# Patient Record
Sex: FEMALE | Race: WHITE | NOT HISPANIC OR LATINO
[De-identification: names, ages, dates, MRNs, and addresses within clinical notes are randomized per-mention and may not be internally consistent; named-entity substitution may affect disease eponyms.]

---

## 2017-04-13 ENCOUNTER — APPOINTMENT (OUTPATIENT)
Dept: PULMONOLOGY | Facility: CLINIC | Age: 82
End: 2017-04-13

## 2017-04-13 VITALS
HEART RATE: 68 BPM | SYSTOLIC BLOOD PRESSURE: 110 MMHG | HEIGHT: 59 IN | DIASTOLIC BLOOD PRESSURE: 80 MMHG | OXYGEN SATURATION: 96 % | TEMPERATURE: 98.3 F | BODY MASS INDEX: 20.16 KG/M2 | WEIGHT: 100 LBS

## 2017-04-21 RX ORDER — AMOXICILLIN 500 MG/1
500 TABLET, FILM COATED ORAL 3 TIMES DAILY
Qty: 42 | Refills: 0 | Status: ACTIVE | COMMUNITY
Start: 2017-04-21 | End: 1900-01-01

## 2017-05-16 ENCOUNTER — INPATIENT (INPATIENT)
Facility: HOSPITAL | Age: 82
LOS: 0 days | Discharge: HOME HEALTH | DRG: 641 | End: 2017-05-17
Attending: SPECIALIST | Admitting: SPECIALIST
Payer: COMMERCIAL

## 2017-05-16 VITALS
OXYGEN SATURATION: 95 % | HEART RATE: 81 BPM | RESPIRATION RATE: 16 BRPM | DIASTOLIC BLOOD PRESSURE: 109 MMHG | WEIGHT: 106.92 LBS | TEMPERATURE: 98 F | SYSTOLIC BLOOD PRESSURE: 184 MMHG | HEIGHT: 60 IN

## 2017-05-16 DIAGNOSIS — Z98.89 OTHER SPECIFIED POSTPROCEDURAL STATES: Chronic | ICD-10-CM

## 2017-05-16 LAB
ALBUMIN SERPL ELPH-MCNC: 2.8 G/DL — LOW (ref 3.4–5)
ALP SERPL-CCNC: 71 U/L — SIGNIFICANT CHANGE UP (ref 40–120)
ALT FLD-CCNC: 22 U/L — SIGNIFICANT CHANGE UP (ref 12–42)
ANION GAP SERPL CALC-SCNC: 7 MMOL/L — LOW (ref 9–16)
APPEARANCE UR: CLEAR — SIGNIFICANT CHANGE UP
APTT BLD: 30.9 SEC — SIGNIFICANT CHANGE UP (ref 27.5–37.4)
AST SERPL-CCNC: 49 U/L — HIGH (ref 15–37)
BACTERIA # UR AUTO: PRESENT /HPF
BASOPHILS NFR BLD AUTO: 0.3 % — SIGNIFICANT CHANGE UP (ref 0–2)
BILIRUB SERPL-MCNC: 0.8 MG/DL — SIGNIFICANT CHANGE UP (ref 0.2–1.2)
BILIRUB UR-MCNC: NEGATIVE — SIGNIFICANT CHANGE UP
BUN SERPL-MCNC: 56 MG/DL — HIGH (ref 7–23)
CALCIUM SERPL-MCNC: 8.6 MG/DL — SIGNIFICANT CHANGE UP (ref 8.5–10.5)
CHLORIDE SERPL-SCNC: 97 MMOL/L — SIGNIFICANT CHANGE UP (ref 96–108)
CK MB CFR SERPL CALC: 1.1 NG/ML — SIGNIFICANT CHANGE UP (ref 0.5–3.6)
CK SERPL-CCNC: 140 U/L — SIGNIFICANT CHANGE UP (ref 26–192)
CO2 SERPL-SCNC: 29 MMOL/L — SIGNIFICANT CHANGE UP (ref 22–31)
COLOR SPEC: YELLOW — SIGNIFICANT CHANGE UP
COMMENT - URINE: SIGNIFICANT CHANGE UP
CREAT SERPL-MCNC: 1.25 MG/DL — SIGNIFICANT CHANGE UP (ref 0.5–1.3)
DIFF PNL FLD: (no result)
EOSINOPHIL NFR BLD AUTO: 2.6 % — SIGNIFICANT CHANGE UP (ref 0–6)
EPI CELLS # UR: (no result) /HPF
GLUCOSE SERPL-MCNC: 105 MG/DL — HIGH (ref 70–99)
GLUCOSE UR QL: NEGATIVE — SIGNIFICANT CHANGE UP
HCT VFR BLD CALC: 36.5 % — SIGNIFICANT CHANGE UP (ref 34.5–45)
HGB BLD-MCNC: 12 G/DL — SIGNIFICANT CHANGE UP (ref 11.5–15.5)
INR BLD: 1.02 — SIGNIFICANT CHANGE UP (ref 0.88–1.16)
KETONES UR-MCNC: NEGATIVE — SIGNIFICANT CHANGE UP
LEUKOCYTE ESTERASE UR-ACNC: NEGATIVE — SIGNIFICANT CHANGE UP
LYMPHOCYTES # BLD AUTO: 11 % — LOW (ref 13–44)
MCHC RBC-ENTMCNC: 30.7 PG — SIGNIFICANT CHANGE UP (ref 27–34)
MCHC RBC-ENTMCNC: 32.9 G/DL — SIGNIFICANT CHANGE UP (ref 32–36)
MCV RBC AUTO: 93.4 FL — SIGNIFICANT CHANGE UP (ref 80–100)
MONOCYTES NFR BLD AUTO: 9.8 % — SIGNIFICANT CHANGE UP (ref 2–14)
NEUTROPHILS NFR BLD AUTO: 76.3 % — SIGNIFICANT CHANGE UP (ref 43–77)
NITRITE UR-MCNC: NEGATIVE — SIGNIFICANT CHANGE UP
PH UR: 5.5 — SIGNIFICANT CHANGE UP (ref 5–8)
PLATELET # BLD AUTO: 189 K/UL — SIGNIFICANT CHANGE UP (ref 150–400)
POTASSIUM SERPL-MCNC: 4.9 MMOL/L — SIGNIFICANT CHANGE UP (ref 3.5–5.3)
POTASSIUM SERPL-SCNC: 4.9 MMOL/L — SIGNIFICANT CHANGE UP (ref 3.5–5.3)
PROT SERPL-MCNC: 9.3 G/DL — HIGH (ref 6.4–8.2)
PROT UR-MCNC: 100 MG/DL
PROTHROM AB SERPL-ACNC: 11.3 SEC — SIGNIFICANT CHANGE UP (ref 9.8–12.7)
RBC # BLD: 3.91 M/UL — SIGNIFICANT CHANGE UP (ref 3.8–5.2)
RBC # FLD: 14.1 % — SIGNIFICANT CHANGE UP (ref 10.3–16.9)
RBC CASTS # UR COMP ASSIST: < 5 /HPF — SIGNIFICANT CHANGE UP
SODIUM SERPL-SCNC: 133 MMOL/L — LOW (ref 135–145)
SP GR SPEC: 1.01 — SIGNIFICANT CHANGE UP (ref 1–1.03)
TROPONIN I SERPL-MCNC: <0.015 NG/ML — SIGNIFICANT CHANGE UP (ref 0.01–0.04)
UROBILINOGEN FLD QL: 0.2 E.U./DL — SIGNIFICANT CHANGE UP
WBC # BLD: 9.7 K/UL — SIGNIFICANT CHANGE UP (ref 3.8–10.5)
WBC # FLD AUTO: 9.7 K/UL — SIGNIFICANT CHANGE UP (ref 3.8–10.5)
WBC UR QL: (no result) /HPF

## 2017-05-16 PROCEDURE — 72100 X-RAY EXAM L-S SPINE 2/3 VWS: CPT | Mod: 26

## 2017-05-16 PROCEDURE — 71275 CT ANGIOGRAPHY CHEST: CPT | Mod: 26

## 2017-05-16 PROCEDURE — 73502 X-RAY EXAM HIP UNI 2-3 VIEWS: CPT | Mod: 26

## 2017-05-16 PROCEDURE — 73502 X-RAY EXAM HIP UNI 2-3 VIEWS: CPT | Mod: 26,RT

## 2017-05-16 PROCEDURE — 99285 EMERGENCY DEPT VISIT HI MDM: CPT | Mod: 25

## 2017-05-16 PROCEDURE — 74174 CTA ABD&PLVS W/CONTRAST: CPT | Mod: 26

## 2017-05-16 PROCEDURE — 93010 ELECTROCARDIOGRAM REPORT: CPT

## 2017-05-16 PROCEDURE — 71020: CPT | Mod: 26

## 2017-05-16 PROCEDURE — 70450 CT HEAD/BRAIN W/O DYE: CPT | Mod: 26

## 2017-05-16 PROCEDURE — 93010 ELECTROCARDIOGRAM REPORT: CPT | Mod: 77

## 2017-05-16 RX ORDER — SODIUM CHLORIDE 9 MG/ML
1000 INJECTION INTRAMUSCULAR; INTRAVENOUS; SUBCUTANEOUS
Qty: 0 | Refills: 0 | Status: DISCONTINUED | OUTPATIENT
Start: 2017-05-16 | End: 2017-05-16

## 2017-05-16 RX ORDER — ASPIRIN/CALCIUM CARB/MAGNESIUM 324 MG
81 TABLET ORAL DAILY
Qty: 0 | Refills: 0 | Status: DISCONTINUED | OUTPATIENT
Start: 2017-05-16 | End: 2017-05-17

## 2017-05-16 RX ORDER — ZOLPIDEM TARTRATE 10 MG/1
5 TABLET ORAL AT BEDTIME
Qty: 0 | Refills: 0 | Status: DISCONTINUED | OUTPATIENT
Start: 2017-05-16 | End: 2017-05-16

## 2017-05-16 RX ORDER — SIMVASTATIN 20 MG/1
10 TABLET, FILM COATED ORAL AT BEDTIME
Qty: 0 | Refills: 0 | Status: DISCONTINUED | OUTPATIENT
Start: 2017-05-16 | End: 2017-05-17

## 2017-05-16 RX ORDER — ACETAMINOPHEN 500 MG
650 TABLET ORAL EVERY 6 HOURS
Qty: 0 | Refills: 0 | Status: DISCONTINUED | OUTPATIENT
Start: 2017-05-16 | End: 2017-05-17

## 2017-05-16 RX ADMIN — ZOLPIDEM TARTRATE 5 MILLIGRAM(S): 10 TABLET ORAL at 23:48

## 2017-05-16 RX ADMIN — SODIUM CHLORIDE 125 MILLILITER(S): 9 INJECTION INTRAMUSCULAR; INTRAVENOUS; SUBCUTANEOUS at 17:21

## 2017-05-16 NOTE — H&P ADULT - PROBLEM SELECTOR PLAN 2
As per ER attending, vascular surgery consulted regarding aneurysm as possible mechanism for fall. As per vascular surgery via ER attending, no surgical intervention tonight, and that patient should be clinically monitored for decompensation. Currently patient not endorsing any abdominal pain.  - Trend vitals q4-6h and serial abdominal exams  - Appreciate vascular surgery recommendations

## 2017-05-16 NOTE — ED PROVIDER NOTE - MEDICAL DECISION MAKING DETAILS
dehydration fall  no fx in LS spine  known AAA  no acute intervention per vasc surgery  req iv hydration

## 2017-05-16 NOTE — H&P ADULT - PROBLEM SELECTOR PLAN 3
Patient with hx of anxiety and insomnia. She says she takes half an ambien per night. Will Rx 2.5-5mg of Ambien x 1.

## 2017-05-16 NOTE — H&P ADULT - NSHPPHYSICALEXAM_GEN_ALL_CORE
General: NAD pleasant  HEENT: MMM otherwise normal  CV: S1S2 RRR no MRG  Lungs: CTA BL  Abdomen: soft NTND +BS  Ext: No CCE +WWP  Neuro: AAOx3 no focal deficits  MSK: paraspinal muscle tenderness b/l thoracic spine ICU Vital Signs Last 24 Hrs  T(C): 36.5, Max: 36.7 (05-16 @ 23:14)  T(F): 97.7, Max: 98 (05-16 @ 23:14)  HR: 89 (73 - 89)  BP: 176/96 (109/69 - 184/109)  BP(mean): --  ABP: --  ABP(mean): --  RR: 17 (16 - 17)  SpO2: 94% (94% - 100%)      General: NAD pleasant  HEENT: MMM otherwise normal  CV: S1S2 RRR no MRG  Lungs: CTA BL  Abdomen: soft NTND +BS  Ext: No CCE +WWP  Neuro: AAOx3 no focal deficits  MSK: paraspinal muscle tenderness b/l thoracic spine

## 2017-05-16 NOTE — ED PROVIDER NOTE - OBJECTIVE STATEMENT
89 yo female with hx of GERD- AAA s/p repair 1 year ago endovasc by Dr Carranza -HTN, HLD  s/p trip and fall 5 days ago pos head trauma- no loc  c/o of low back pain right sided  no alley focal weakness noted- c/o of constipation but also abd fullness and vague pain x 1 month- no CP or syncope  no palpitations

## 2017-05-16 NOTE — ED ADULT NURSE NOTE - OBJECTIVE STATEMENT
89 y/o female sent by PMD w/ home health aide c/o back tenderness s/p mechanical fall. Patient states "I went to my PMD for the back pain after my fall Friday and he told me that I should come to the ER. Also he said that I have a UTI." Patient at baseline ambulatory with walker or assistance, A+Ox3, skin intact.

## 2017-05-16 NOTE — ED ADULT TRIAGE NOTE - CHIEF COMPLAINT QUOTE
Pt directed to ED by PCP for UTI management and eval of Fall on Friday.  Pt states "I hit my head but I wasn't knocked out, I just lost my foot."  Pt CO Back Pain 8/10.  Pt denies N/V/D, SOB, Fevers

## 2017-05-16 NOTE — H&P ADULT - PROBLEM SELECTOR PLAN 5
Not currently on treatment however, as per CT thoracic study, there is concern for the possibility of invasive adenocarcinoma v. lymphoma.   - Will consider Pulmonary and Oncology consults v. referral as outpatient

## 2017-05-16 NOTE — H&P ADULT - HISTORY OF PRESENT ILLNESS
89F hx of HTN, HLD, Breast CA, GERD, Anxiety/Insomnia presented to [facility] s/p mechanical fall. Per patient she misstepped, tripped, and fell to the floor backwards hurting her back in the process. She remembers falling, and knows that she was being clumsy. She denies any LOC or confusion or head trauma. She then went with her  to the ER for pain control.     In the ED patient was started on IVF, and underwent CT evaluation for her fall 89F hx of HTN, HLD, Breast CA, GERD, Anxiety/Insomnia presented to [facility] s/p mechanical fall. Per patient she misstepped, tripped, and fell to the floor backwards hurting her back in the process. She remembers falling, and knows that she was being clumsy. She denies any LOC or confusion or head trauma. She then went with her  to the ER for pain control.     In the ED patient was started on IVF, and underwent CT evaluation for her fall   Vascular Surgery consulted for finding of 5.0 cm AAA

## 2017-05-16 NOTE — H&P ADULT - NSHPLABSRESULTS_GEN_ALL_CORE
12.0   9.7   )-----------( 189      ( 16 May 2017 17:17 )             36.5     05-16    133<L>  |  97  |  56<H>  ----------------------------<  105<H>  4.9   |  29  |  1.25    Ca    8.6      16 May 2017 17:18    TPro  9.3<H>  /  Alb  2.8<L>  /  TBili  0.8  /  DBili  x   /  AST  49<H>  /  ALT  22  /  AlkPhos  71  05-16    EXAM:  XR SPINE-LS-2 OR 3 VIEWS                        PROCEDURE DATE:  05/16/2017    Impression:  1. No acute injury detected. Diffuse osteopenia slightly limits   evaluation for detection of acute lucent fracture lines.  2. Minimal wedging along superior aspect of L1, age-indeterminate. If   symptoms persist, consideration could be given to cross-sectional imaging.  3. Multilevel degenerative osteoarthritis including degenerative   anterolistheses of L3 on L4 and L4 on L5. No pars defects identified.  4. Multilevel degenerative disc disease.  5. Mild dextrocurvature in the lumbar spine.      EXAM:  CT BRAIN                        PROCEDURE DATE:  05/16/2017    IMPRESSION:    1. No CT evidence of acute intracranial hemorrhage and no apparent acute   abnormality.  2. Moderate to severe chronic microvascular ischemic disease with   probable tiny lacunar infarcts in the basal ganglia bilaterally and   thalami bilaterally.  3. Age-appropriate volume loss.      EXAM:  CT ANGIO CHEST (W)AW IC                        EXAM:  CT ANGIO ABD PELV (W)AW IC          IMPRESSION:  1. Abdominal aortic biiliac endovascular stent graft in situ. Visualized   endoleak on delayed imaging. Fusiform aneurysmal dilatation of the   infrarenal abdominal aorta measuring up to 5.0 cm diameter. Slight   increase in size of the aneurysm is compared to prior study where it   measured 4.4cm. No abdominal aortic aneurysm rupture.  2. Cholelithiasis.  3. Wedge compression fracture of superior endplate of L1 new as compared   to prior study.  4. Worsening of consolidation versus mass in the posterior basal segments   lower lobes bilaterally. Cannot rule out invasive adenocarcinoma,   lymphoma. Recommend CT-guided FNA.

## 2017-05-16 NOTE — H&P ADULT - PROBLEM SELECTOR PLAN 1
Patient s/p mechanical fall - on history no stigmata signs of syncopal/pre-syncopal episode v. seizure v. cva

## 2017-05-16 NOTE — ED PROVIDER NOTE - PMH
Anxiety    Breast cancer in female    GERD (gastroesophageal reflux disease)    HTN (hypertension)    Hyperlipidemia

## 2017-05-16 NOTE — ED PROVIDER NOTE - PROGRESS NOTE DETAILS
warm feet bilateral 2+ pulses Fem pop DP PT  nl upper ext pulses-  CTA  small endo vasc leak---  5.0 cm AAA now-- was 4.4 cm 1 year ago- vasc surgery aware- no acute intervention dr duckworth aware  pt clinically apearrs intravasc dry- will require admission for iv hydration

## 2017-05-16 NOTE — ED CLERICAL - NS ED CLERK NOTE PRE-ARRIVAL INFORMATION; ADDITIONAL PRE-ARRIVAL INFORMATION
89/F/ TILA CASTELLON OF DR. BUSH HX OF LEAKING ABDOMINAL ANEURYSM ANOREXIA, DYSPHAGIA HYPERTENSIVE? DEHYDRATION CALL DR. JACKSON (DR. LOMAS)

## 2017-05-17 ENCOUNTER — TRANSCRIPTION ENCOUNTER (OUTPATIENT)
Age: 82
End: 2017-05-17

## 2017-05-17 VITALS
TEMPERATURE: 98 F | HEART RATE: 84 BPM | SYSTOLIC BLOOD PRESSURE: 175 MMHG | RESPIRATION RATE: 17 BRPM | OXYGEN SATURATION: 95 % | DIASTOLIC BLOOD PRESSURE: 95 MMHG

## 2017-05-17 DIAGNOSIS — R63.8 OTHER SYMPTOMS AND SIGNS CONCERNING FOOD AND FLUID INTAKE: ICD-10-CM

## 2017-05-17 DIAGNOSIS — F41.9 ANXIETY DISORDER, UNSPECIFIED: ICD-10-CM

## 2017-05-17 DIAGNOSIS — K21.9 GASTRO-ESOPHAGEAL REFLUX DISEASE WITHOUT ESOPHAGITIS: ICD-10-CM

## 2017-05-17 DIAGNOSIS — J69.0 PNEUMONITIS DUE TO INHALATION OF FOOD AND VOMIT: ICD-10-CM

## 2017-05-17 DIAGNOSIS — I71.4 ABDOMINAL AORTIC ANEURYSM, WITHOUT RUPTURE: ICD-10-CM

## 2017-05-17 DIAGNOSIS — C50.919 MALIGNANT NEOPLASM OF UNSPECIFIED SITE OF UNSPECIFIED FEMALE BREAST: ICD-10-CM

## 2017-05-17 DIAGNOSIS — Z29.9 ENCOUNTER FOR PROPHYLACTIC MEASURES, UNSPECIFIED: ICD-10-CM

## 2017-05-17 DIAGNOSIS — E86.0 DEHYDRATION: ICD-10-CM

## 2017-05-17 DIAGNOSIS — S32.000A WEDGE COMPRESSION FRACTURE OF UNSPECIFIED LUMBAR VERTEBRA, INITIAL ENCOUNTER FOR CLOSED FRACTURE: ICD-10-CM
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
## 2017-05-17 LAB
ANION GAP SERPL CALC-SCNC: 9 MMOL/L — SIGNIFICANT CHANGE UP (ref 9–16)
APTT BLD: 29.4 SEC — SIGNIFICANT CHANGE UP (ref 27.5–37.4)
BLD GP AB SCN SERPL QL: NEGATIVE — SIGNIFICANT CHANGE UP
BUN SERPL-MCNC: 40 MG/DL — HIGH (ref 7–23)
CALCIUM SERPL-MCNC: 8.5 MG/DL — SIGNIFICANT CHANGE UP (ref 8.5–10.5)
CHLORIDE SERPL-SCNC: 102 MMOL/L — SIGNIFICANT CHANGE UP (ref 96–108)
CO2 SERPL-SCNC: 29 MMOL/L — SIGNIFICANT CHANGE UP (ref 22–31)
CREAT SERPL-MCNC: 1.21 MG/DL — SIGNIFICANT CHANGE UP (ref 0.5–1.3)
CULTURE RESULTS: SIGNIFICANT CHANGE UP
GLUCOSE SERPL-MCNC: 94 MG/DL — SIGNIFICANT CHANGE UP (ref 70–99)
HCT VFR BLD CALC: 35.5 % — SIGNIFICANT CHANGE UP (ref 34.5–45)
HGB BLD-MCNC: 11.6 G/DL — SIGNIFICANT CHANGE UP (ref 11.5–15.5)
INR BLD: 1.06 — SIGNIFICANT CHANGE UP (ref 0.88–1.16)
MAGNESIUM SERPL-MCNC: 2 MG/DL — SIGNIFICANT CHANGE UP (ref 1.6–2.6)
MCHC RBC-ENTMCNC: 31 PG — SIGNIFICANT CHANGE UP (ref 27–34)
MCHC RBC-ENTMCNC: 32.7 G/DL — SIGNIFICANT CHANGE UP (ref 32–36)
MCV RBC AUTO: 94.9 FL — SIGNIFICANT CHANGE UP (ref 80–100)
PLATELET # BLD AUTO: 184 K/UL — SIGNIFICANT CHANGE UP (ref 150–400)
POTASSIUM SERPL-MCNC: 3.5 MMOL/L — SIGNIFICANT CHANGE UP (ref 3.5–5.3)
POTASSIUM SERPL-SCNC: 3.5 MMOL/L — SIGNIFICANT CHANGE UP (ref 3.5–5.3)
PROTHROM AB SERPL-ACNC: 11.8 SEC — SIGNIFICANT CHANGE UP (ref 9.8–12.7)
RBC # BLD: 3.74 M/UL — LOW (ref 3.8–5.2)
RBC # FLD: 13.8 % — SIGNIFICANT CHANGE UP (ref 10.3–16.9)
RH IG SCN BLD-IMP: POSITIVE — SIGNIFICANT CHANGE UP
SODIUM SERPL-SCNC: 140 MMOL/L — SIGNIFICANT CHANGE UP (ref 135–145)
SPECIMEN SOURCE: SIGNIFICANT CHANGE UP
WBC # BLD: 8.4 K/UL — SIGNIFICANT CHANGE UP (ref 3.8–10.5)
WBC # FLD AUTO: 8.4 K/UL — SIGNIFICANT CHANGE UP (ref 3.8–10.5)

## 2017-05-17 PROCEDURE — 86900 BLOOD TYPING SEROLOGIC ABO: CPT

## 2017-05-17 PROCEDURE — 84484 ASSAY OF TROPONIN QUANT: CPT

## 2017-05-17 PROCEDURE — 72100 X-RAY EXAM L-S SPINE 2/3 VWS: CPT

## 2017-05-17 PROCEDURE — 73502 X-RAY EXAM HIP UNI 2-3 VIEWS: CPT

## 2017-05-17 PROCEDURE — 71275 CT ANGIOGRAPHY CHEST: CPT

## 2017-05-17 PROCEDURE — 74174 CTA ABD&PLVS W/CONTRAST: CPT

## 2017-05-17 PROCEDURE — 86901 BLOOD TYPING SEROLOGIC RH(D): CPT

## 2017-05-17 PROCEDURE — 93010 ELECTROCARDIOGRAM REPORT: CPT

## 2017-05-17 PROCEDURE — 80053 COMPREHEN METABOLIC PANEL: CPT

## 2017-05-17 PROCEDURE — 97161 PT EVAL LOW COMPLEX 20 MIN: CPT

## 2017-05-17 PROCEDURE — 93005 ELECTROCARDIOGRAM TRACING: CPT

## 2017-05-17 PROCEDURE — 87086 URINE CULTURE/COLONY COUNT: CPT

## 2017-05-17 PROCEDURE — 71046 X-RAY EXAM CHEST 2 VIEWS: CPT

## 2017-05-17 PROCEDURE — 80048 BASIC METABOLIC PNL TOTAL CA: CPT

## 2017-05-17 PROCEDURE — 70450 CT HEAD/BRAIN W/O DYE: CPT

## 2017-05-17 PROCEDURE — 85027 COMPLETE CBC AUTOMATED: CPT

## 2017-05-17 PROCEDURE — 82553 CREATINE MB FRACTION: CPT

## 2017-05-17 PROCEDURE — 72170 X-RAY EXAM OF PELVIS: CPT

## 2017-05-17 PROCEDURE — 86850 RBC ANTIBODY SCREEN: CPT

## 2017-05-17 PROCEDURE — 85610 PROTHROMBIN TIME: CPT

## 2017-05-17 PROCEDURE — 83690 ASSAY OF LIPASE: CPT

## 2017-05-17 PROCEDURE — 99285 EMERGENCY DEPT VISIT HI MDM: CPT | Mod: 25

## 2017-05-17 PROCEDURE — 85025 COMPLETE CBC W/AUTO DIFF WBC: CPT

## 2017-05-17 PROCEDURE — 85730 THROMBOPLASTIN TIME PARTIAL: CPT

## 2017-05-17 PROCEDURE — 81001 URINALYSIS AUTO W/SCOPE: CPT

## 2017-05-17 PROCEDURE — 82550 ASSAY OF CK (CPK): CPT

## 2017-05-17 PROCEDURE — 36415 COLL VENOUS BLD VENIPUNCTURE: CPT

## 2017-05-17 PROCEDURE — 83735 ASSAY OF MAGNESIUM: CPT

## 2017-05-17 RX ORDER — POTASSIUM CHLORIDE 20 MEQ
20 PACKET (EA) ORAL ONCE
Qty: 0 | Refills: 0 | Status: COMPLETED | OUTPATIENT
Start: 2017-05-17 | End: 2017-05-17

## 2017-05-17 RX ORDER — POTASSIUM CHLORIDE 20 MEQ
10 PACKET (EA) ORAL
Qty: 0 | Refills: 0 | Status: COMPLETED | OUTPATIENT
Start: 2017-05-17 | End: 2017-05-17

## 2017-05-17 RX ORDER — CARVEDILOL PHOSPHATE 80 MG/1
3.12 CAPSULE, EXTENDED RELEASE ORAL EVERY 12 HOURS
Qty: 0 | Refills: 0 | Status: DISCONTINUED | OUTPATIENT
Start: 2017-05-17 | End: 2017-05-17

## 2017-05-17 RX ADMIN — Medication 100 MILLIEQUIVALENT(S): at 10:17

## 2017-05-17 RX ADMIN — Medication 20 MILLIEQUIVALENT(S): at 15:23

## 2017-05-17 RX ADMIN — CARVEDILOL PHOSPHATE 3.12 MILLIGRAM(S): 80 CAPSULE, EXTENDED RELEASE ORAL at 05:36

## 2017-05-17 RX ADMIN — Medication 100 MILLIEQUIVALENT(S): at 12:24

## 2017-05-17 RX ADMIN — Medication 81 MILLIGRAM(S): at 11:03

## 2017-05-17 RX ADMIN — Medication 100 MILLIEQUIVALENT(S): at 08:09

## 2017-05-17 NOTE — DISCHARGE NOTE ADULT - PLAN OF CARE
You have had a fall today. It appears that the cause is “mechanical”. That means that you slipped, tripped or lost your balance. If your fall had been due to fainting or a seizure, further tests would be required. Health conditions which change your blood pressure, vision, muscle strength and coordination may increase your risk for falls. Medication can also increase your risk if they make you dizzy, weak, or sleepy. To prevent falls, you can stand or sit up slowly, use assistive devices as directed, wear shoes that fit well and have soles that , wear a personal alarm, stay active, and manage your medical conditions. Home safety tips include keeping your path clear, removing small rugs, not walking on wet surfaces, installing bright lights at home, and keeping items you use often on shelves within reach. discontinue ambien maintain a normal blood pressure follow up with Dr. Prado in 2 weeks maintain normal cholesterol level CT chest incidentally showed Worsening of consolidation versus mass in the posterior basal segments lower lobes bilaterally. Cannot rule out invasive adenocarcinoma, lymphoma. Recommend CT-guided FNA. Contact Dr. Smith for further management

## 2017-05-17 NOTE — PHYSICAL THERAPY INITIAL EVALUATION ADULT - GAIT DEVIATIONS NOTED, PT EVAL
decreased heel toe gait with occasional scissoring on Right, improved with verbal cues/decreased austyn/decreased step length

## 2017-05-17 NOTE — PROGRESS NOTE ADULT - PROBLEM SELECTOR PLAN 5
discussed with pulmonary Dr. Smith who gives history of recurrent aspiration causing consolidations.  ? speech and swallow eval.  His recommendation is to do nothing in elderly.

## 2017-05-17 NOTE — DISCHARGE NOTE ADULT - CARE PLAN
Principal Discharge DX:	Fall, initial encounter  Goal:	discontinue ambien  Instructions for follow-up, activity and diet:	You have had a fall today. It appears that the cause is “mechanical”. That means that you slipped, tripped or lost your balance. If your fall had been due to fainting or a seizure, further tests would be required. Health conditions which change your blood pressure, vision, muscle strength and coordination may increase your risk for falls. Medication can also increase your risk if they make you dizzy, weak, or sleepy. To prevent falls, you can stand or sit up slowly, use assistive devices as directed, wear shoes that fit well and have soles that , wear a personal alarm, stay active, and manage your medical conditions. Home safety tips include keeping your path clear, removing small rugs, not walking on wet surfaces, installing bright lights at home, and keeping items you use often on shelves within reach.  Secondary Diagnosis:	HTN (hypertension)  Goal:	maintain a normal blood pressure  Secondary Diagnosis:	AAA (abdominal aortic aneurysm)  Goal:	follow up with Dr. Prado in 2 weeks  Secondary Diagnosis:	Breast cancer in female  Secondary Diagnosis:	Hyperlipidemia  Goal:	maintain normal cholesterol level Principal Discharge DX:	Fall, initial encounter  Goal:	discontinue ambien  Instructions for follow-up, activity and diet:	You have had a fall today. It appears that the cause is “mechanical”. That means that you slipped, tripped or lost your balance. If your fall had been due to fainting or a seizure, further tests would be required. Health conditions which change your blood pressure, vision, muscle strength and coordination may increase your risk for falls. Medication can also increase your risk if they make you dizzy, weak, or sleepy. To prevent falls, you can stand or sit up slowly, use assistive devices as directed, wear shoes that fit well and have soles that , wear a personal alarm, stay active, and manage your medical conditions. Home safety tips include keeping your path clear, removing small rugs, not walking on wet surfaces, installing bright lights at home, and keeping items you use often on shelves within reach.  Secondary Diagnosis:	HTN (hypertension)  Goal:	maintain a normal blood pressure  Secondary Diagnosis:	AAA (abdominal aortic aneurysm)  Goal:	follow up with Dr. Prado in 2 weeks  Secondary Diagnosis:	Breast cancer in female  Secondary Diagnosis:	Hyperlipidemia  Goal:	maintain normal cholesterol level  Secondary Diagnosis:	Lung mass  Goal:	Contact Dr. Smith for further management  Instructions for follow-up, activity and diet:	CT chest incidentally showed Worsening of consolidation versus mass in the posterior basal segments lower lobes bilaterally. Cannot rule out invasive adenocarcinoma, lymphoma. Recommend CT-guided FNA.

## 2017-05-17 NOTE — DISCHARGE NOTE ADULT - PATIENT PORTAL LINK FT
“You can access the Follow[employer] Patient Portal, offered by [facility], by registering with the following website: [email or web address]”

## 2017-05-17 NOTE — PROGRESS NOTE ADULT - ASSESSMENT
89F hx of HTN, HLD, Breast CA, GERD, Anxiety/Insomnia, AAA s/p EVAR 3/2016 now with increased AAA from 4.4 to 5 on CTA and a small endoleak.   -pt hemodynamically stable  -no operative intervention at this time  -please have pt follow up with Dr Carranza in 2 weeks from discharge. [phone]

## 2017-05-17 NOTE — PHYSICAL THERAPY INITIAL EVALUATION ADULT - ADDITIONAL COMMENTS
As per patient, she has a home attendant 3x/week for ~5 hours. She ambulates in the home with a rolling walker and uses a cane and assist from the aide for community ambulation.

## 2017-05-17 NOTE — PROGRESS NOTE ADULT - SUBJECTIVE AND OBJECTIVE BOX
SUBJECTIVE: Pt seen and examined at bedside. No overnight events.  Pain controlled. No f/c/n/v.     Vital Signs Last 24 Hrs  T(C): 36.8, Max: 36.8 ( @ 08:57)  T(F): 98.2, Max: 98.2 ( @ 08:57)  HR: 84 (73 - 89)  BP: 175/95 (109/69 - 185/94)  BP(mean): --  RR: 17 (16 - 18)  SpO2: 95% (94% - 100%)    PHYSICAL EXAM    Gen: NAD  CV: RRR  Pulm: no resp distress  Abd: Soft, NT/ND  Ext: WWP    I&O's Detail      LABS:                        11.6   8.4   )-----------( 184      ( 17 May 2017 06:28 )             35.5         140  |  102  |  40<H>  ----------------------------<  94  3.5   |  29  |  1.21    Ca    8.5      17 May 2017 06:28  Mg     2.0         TPro  9.3<H>  /  Alb  2.8<L>  /  TBili  0.8  /  DBili  x   /  AST  49<H>  /  ALT  22  /  AlkPhos  71      PT/INR - ( 17 May 2017 06:28 )   PT: 11.8 sec;   INR: 1.06          PTT - ( 17 May 2017 06:28 )  PTT:29.4 sec  Urinalysis Basic - ( 16 May 2017 17:17 )    Color: Yellow / Appearance: Clear / S.015 / pH: x  Gluc: x / Ketone: NEGATIVE  / Bili: NEGATIVE / Urobili: 0.2 E.U./dL   Blood: x / Protein: 100 mg/dL / Nitrite: NEGATIVE   Leuk Esterase: Moderate / RBC: < 5 /HPF / WBC 5-10 /HPF   Sq Epi: x / Non Sq Epi: Moderate /HPF / Bacteria: Present /HPF        MEDICATIONS  (STANDING):  simvastatin 10milliGRAM(s) Oral at bedtime  aspirin enteric coated 81milliGRAM(s) Oral daily  carvedilol 3.125milliGRAM(s) Oral every 12 hours    MEDICATIONS  (PRN):  acetaminophen   Tablet. 650milliGRAM(s) Oral every 6 hours PRN Severe Pain (7 - 10)      RADIOLOGY & ADDITIONAL STUDIES:    ASSESSMENT AND PLAN

## 2017-05-17 NOTE — DISCHARGE NOTE ADULT - CARE PROVIDERS DIRECT ADDRESSES
[email or web address]

## 2017-05-17 NOTE — DISCHARGE NOTE ADULT - MEDICATION SUMMARY - MEDICATIONS TO TAKE
I will START or STAY ON the medications listed below when I get home from the hospital:    aspirin 81 mg oral delayed release tablet  -- 1 tab(s) by mouth once a day. This is over the counter  -- Indication: For Prophylactic measure    simvastatin  --  by mouth   -- Indication: For HLD    bisoprolol 5 mg oral tablet  -- 1 tab(s) by mouth once a day  -- Indication: For AAA (abdominal aortic aneurysm)    Colace  --  by mouth   -- Indication: For Prophylactic measure    omeprazole 40 mg oral delayed release capsule  -- 1 cap(s) by mouth once a day  -- Indication: For GERD

## 2017-05-17 NOTE — DISCHARGE NOTE ADULT - HOSPITAL COURSE
89F hx of HTN, HLD, Breast CA, GERD, Anxiety/Insomnia presented to [facility] s/p mechanical fall. Per patient she misstepped, tripped, and fell to the floor backwards hurting her back in the process. She remembers falling, and knows that she was being clumsy. She denies any LOC or confusion or head trauma. She then went with her  to the ER for pain control. In the ED patient was started on IVF, and underwent CT evaluation for her fall Vascular Surgery consulted for finding of 5.0 cm AAA. CT head negative for intracranial pathology and XR hip negative for fracture. Vascular surgery noted no immediate intervention to be performed at this time for the AAA. Patient will be discharged home with home PT.

## 2017-05-17 NOTE — DISCHARGE NOTE ADULT - CARE PROVIDER_API CALL
Duke Carranza), Surgery; Vascular Surgery  [address] [place] [address]  Phone: [phone]  Fax: [phone] Duke Carranza), Surgery; Vascular Surgery  [address] [address]  Phone: [phone]  Fax: [phone]    Satinder Smith), Internal Medicine; Pulmonary Disease  [address] [place] [address]  Phone: [phone]  Fax: [phone]

## 2017-05-17 NOTE — PROGRESS NOTE ADULT - SUBJECTIVE AND OBJECTIVE BOX
89F hx of HTN, HLD, Breast CA, GERD, h/o AAA s/p endovascular stent, Anxiety/Insomnia presented to [facility] s/p mechanical fall. Per patient she misstepped, tripped, and fell to the floor backwards hurting her back in the process. She remembers falling, and knows that she was being clumsy. She denies any LOC or confusion or head trauma. She then went with her  to the ER for pain control.     In the ED patient was started on IVF, and underwent CT evaluation for her fall   Vascular Surgery consulted for finding of 5.0 cm AAA (16 May 2017 23:24)      REVIEW OF SYSTEMS:  Constitutional: No fever, weight loss or fatigue + poor appetite  ENMT:  No difficulty hearing, tinnitus, vertigo; No sinus or throat pain  Respiratory: No cough, wheezing, chills or hemoptysis  Cardiovascular: No chest pain, palpitations, dizziness or leg swelling  Gastrointestinal: No abdominal or epigastric pain. No nausea, vomiting or hematemesis; No diarrhea or constipation. No melena or hematochezia.  Skin: No itching, burning, rashes or lesions   Musculoskeletal: No joint pain or swelling; No muscle, back or extremity pain    PAST MEDICAL & SURGICAL HISTORY:  Breast cancer in female  Hyperlipidemia  Anxiety  HTN (hypertension)  GERD (gastroesophageal reflux disease)  H/O:   H/O lumpectomy      FAMILY HISTORY:  No pertinent family history in first degree relatives      SOCIAL HISTORY:  Smoking Status: [ ] Current, [ ] Former, [ ] Never  Pack Years:    MEDICATIONS:  MEDICATIONS  (STANDING):  simvastatin 10milliGRAM(s) Oral at bedtime  aspirin enteric coated 81milliGRAM(s) Oral daily  carvedilol 3.125milliGRAM(s) Oral every 12 hours  potassium chloride  10 mEq/100 mL IVPB 10milliEquivalent(s) IV Intermittent every 1 hour    MEDICATIONS  (PRN):  acetaminophen   Tablet. 650milliGRAM(s) Oral every 6 hours PRN Severe Pain (7 - 10)      Allergies    No Known Allergies    Intolerances        Vital Signs Last 24 Hrs  T(C): 36.8, Max: 36.8 (05-17 @ 08:57)  T(F): 98.2, Max: 98.2 (05-17 @ 08:57)  HR: 84 (73 - 89)  BP: 175/95 (109/69 - 185/94)  BP(mean): --  RR: 17 (16 - 18)  SpO2: 95% (94% - 100%)        PHYSICAL EXAM:    General: thin in no acute distress  HEENT: MMM, conjunctiva and sclera clear  Lungs: decreased BS left,  right is clear  Heart: regular, no audible murmur  Gastrointestinal: Soft, non-tender non-distended; Normal bowel sounds; No rebound or guarding  Extremities: Normal range of motion, No clubbing, cyanosis or edema  Neurological: Alert and oriented x3  Skin: Warm and dry. No obvious rash      LABS:                        11.6   8.4   )-----------( 184      ( 17 May 2017 06:28 )             35.5         140  |  102  |  40<H>  ----------------------------<  94  3.5   |  29  |  1.21    Ca    8.5      17 May 2017 06:28  Mg     2.0         TPro  9.3<H>  /  Alb  2.8<L>  /  TBili  0.8  /  DBili  x   /  AST  49<H>  /  ALT  22  /  AlkPhos  71        Culture Results:   Less than 10,000 cols/cc; Insignificant amount of growth. ( @ 18:02)      RADIOLOGY & ADDITIONAL STUDIES:   CT ANGIO ABD PELV (W)AW IC     CT ANGIO CHEST1. Abdominal aortic biiliac endovascular stent graft in situ. Visualized   endoleak on delayed imaging. Fusiform aneurysmal dilatation of the   infrarenal abdominal aorta measuring up to 5.0 cm diameter. Slight   increase in size of the aneurysm is compared to prior study where it   measured 4.4cm. No abdominal aortic aneurysm rupture.  2. Cholelithiasis.  3. Wedge compression fracture of superior endplate of L1 new as compared   to prior study.  4. Worsening of consolidation versus mass in the posterior basal segments   lower lobes bilaterally. Cannot rule out invasive adenocarcinoma,   lymphoma. Recommend CT-guided FNA.
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
## 2017-05-20 DIAGNOSIS — E86.0 DEHYDRATION: ICD-10-CM

## 2017-05-20 DIAGNOSIS — C50.919 MALIGNANT NEOPLASM OF UNSPECIFIED SITE OF UNSPECIFIED FEMALE BREAST: ICD-10-CM
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
## 2017-05-20 DIAGNOSIS — F41.9 ANXIETY DISORDER, UNSPECIFIED: ICD-10-CM

## 2017-05-20 DIAGNOSIS — T82.330A LEAKAGE OF AORTIC (BIFURCATION) GRAFT (REPLACEMENT), INITIAL ENCOUNTER: ICD-10-CM

## 2017-05-20 DIAGNOSIS — E78.5 HYPERLIPIDEMIA, UNSPECIFIED: ICD-10-CM

## 2017-05-20 DIAGNOSIS — M48.57XA COLLAPSED VERTEBRA, NOT ELSEWHERE CLASSIFIED, LUMBOSACRAL REGION, INITIAL ENCOUNTER FOR FRACTURE: ICD-10-CM

## 2017-05-20 DIAGNOSIS — I71.4 ABDOMINAL AORTIC ANEURYSM, WITHOUT RUPTURE: ICD-10-CM

## 2017-05-20 DIAGNOSIS — R91.8 OTHER NONSPECIFIC ABNORMAL FINDING OF LUNG FIELD: ICD-10-CM

## 2017-05-20 DIAGNOSIS — K21.9 GASTRO-ESOPHAGEAL REFLUX DISEASE WITHOUT ESOPHAGITIS: ICD-10-CM

## 2017-05-20 DIAGNOSIS — Z98.890 OTHER SPECIFIED POSTPROCEDURAL STATES: ICD-10-CM

## 2017-05-20 DIAGNOSIS — G47.00 INSOMNIA, UNSPECIFIED: ICD-10-CM
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
## 2017-05-20 DIAGNOSIS — Z79.82 LONG TERM (CURRENT) USE OF ASPIRIN: ICD-10-CM
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
## 2017-06-02 ENCOUNTER — APPOINTMENT (OUTPATIENT)
Dept: VASCULAR SURGERY | Facility: CLINIC | Age: 82
End: 2017-06-02

## 2017-06-02 VITALS — DIASTOLIC BLOOD PRESSURE: 88 MMHG | OXYGEN SATURATION: 95 % | HEART RATE: 85 BPM | SYSTOLIC BLOOD PRESSURE: 134 MMHG

## 2017-11-14 ENCOUNTER — INPATIENT (INPATIENT)
Facility: HOSPITAL | Age: 82
LOS: 2 days | Discharge: NURSING, REHAB OR LONG-TERM CARE | DRG: 871 | End: 2017-11-17
Attending: INTERNAL MEDICINE | Admitting: INTERNAL MEDICINE
Payer: MEDICARE

## 2017-11-14 VITALS
WEIGHT: 87.08 LBS | TEMPERATURE: 98 F | DIASTOLIC BLOOD PRESSURE: 81 MMHG | OXYGEN SATURATION: 96 % | SYSTOLIC BLOOD PRESSURE: 126 MMHG | RESPIRATION RATE: 18 BRPM | HEART RATE: 101 BPM

## 2017-11-14 DIAGNOSIS — Z98.89 OTHER SPECIFIED POSTPROCEDURAL STATES: Chronic | ICD-10-CM

## 2017-11-14 LAB
ALBUMIN SERPL ELPH-MCNC: 2.7 G/DL — LOW (ref 3.3–5)
ALP SERPL-CCNC: 80 U/L — SIGNIFICANT CHANGE UP (ref 40–120)
ALT FLD-CCNC: 30 U/L — SIGNIFICANT CHANGE UP (ref 10–45)
ANION GAP SERPL CALC-SCNC: 12 MMOL/L — SIGNIFICANT CHANGE UP (ref 5–17)
APPEARANCE UR: CLEAR — SIGNIFICANT CHANGE UP
APTT BLD: 26.8 SEC — LOW (ref 27.5–37.4)
AST SERPL-CCNC: 32 U/L — SIGNIFICANT CHANGE UP (ref 10–40)
BACTERIA # UR AUTO: (no result) /HPF
BASOPHILS NFR BLD AUTO: 0.2 % — SIGNIFICANT CHANGE UP (ref 0–2)
BILIRUB SERPL-MCNC: 0.4 MG/DL — SIGNIFICANT CHANGE UP (ref 0.2–1.2)
BILIRUB UR-MCNC: NEGATIVE — SIGNIFICANT CHANGE UP
BUN SERPL-MCNC: 42 MG/DL — HIGH (ref 7–23)
CALCIUM SERPL-MCNC: 12.3 MG/DL — HIGH (ref 8.4–10.5)
CHLORIDE SERPL-SCNC: 95 MMOL/L — LOW (ref 96–108)
CO2 SERPL-SCNC: 26 MMOL/L — SIGNIFICANT CHANGE UP (ref 22–31)
COLOR SPEC: YELLOW — SIGNIFICANT CHANGE UP
CREAT SERPL-MCNC: 1.29 MG/DL — SIGNIFICANT CHANGE UP (ref 0.5–1.3)
DIFF PNL FLD: NEGATIVE — SIGNIFICANT CHANGE UP
EOSINOPHIL NFR BLD AUTO: 0.1 % — SIGNIFICANT CHANGE UP (ref 0–6)
EPI CELLS # UR: SIGNIFICANT CHANGE UP /HPF (ref 0–5)
GLUCOSE SERPL-MCNC: 132 MG/DL — HIGH (ref 70–99)
GLUCOSE UR QL: NEGATIVE — SIGNIFICANT CHANGE UP
HCT VFR BLD CALC: 32.9 % — LOW (ref 34.5–45)
HGB BLD-MCNC: 10 G/DL — LOW (ref 11.5–15.5)
INR BLD: 1.1 — SIGNIFICANT CHANGE UP (ref 0.88–1.16)
KETONES UR-MCNC: NEGATIVE — SIGNIFICANT CHANGE UP
LACTATE SERPL-SCNC: 1.1 MMOL/L — SIGNIFICANT CHANGE UP (ref 0.5–2)
LEUKOCYTE ESTERASE UR-ACNC: NEGATIVE — SIGNIFICANT CHANGE UP
LYMPHOCYTES # BLD AUTO: 7.2 % — LOW (ref 13–44)
MCHC RBC-ENTMCNC: 28.7 PG — SIGNIFICANT CHANGE UP (ref 27–34)
MCHC RBC-ENTMCNC: 30.4 G/DL — LOW (ref 32–36)
MCV RBC AUTO: 94.5 FL — SIGNIFICANT CHANGE UP (ref 80–100)
MONOCYTES NFR BLD AUTO: 9.9 % — SIGNIFICANT CHANGE UP (ref 2–14)
NEUTROPHILS NFR BLD AUTO: 82.6 % — HIGH (ref 43–77)
NITRITE UR-MCNC: POSITIVE
PH UR: 7 — SIGNIFICANT CHANGE UP (ref 5–8)
PLATELET # BLD AUTO: 315 K/UL — SIGNIFICANT CHANGE UP (ref 150–400)
POTASSIUM SERPL-MCNC: 5.4 MMOL/L — HIGH (ref 3.5–5.3)
POTASSIUM SERPL-SCNC: 5.4 MMOL/L — HIGH (ref 3.5–5.3)
PROT SERPL-MCNC: 9.1 G/DL — HIGH (ref 6–8.3)
PROT UR-MCNC: (no result) MG/DL
PROTHROM AB SERPL-ACNC: 12.2 SEC — SIGNIFICANT CHANGE UP (ref 9.8–12.7)
RAPID RVP RESULT: SIGNIFICANT CHANGE UP
RBC # BLD: 3.48 M/UL — LOW (ref 3.8–5.2)
RBC # FLD: 14.7 % — SIGNIFICANT CHANGE UP (ref 10.3–16.9)
RBC CASTS # UR COMP ASSIST: < 5 /HPF — SIGNIFICANT CHANGE UP
SODIUM SERPL-SCNC: 133 MMOL/L — LOW (ref 135–145)
SP GR SPEC: 1.01 — SIGNIFICANT CHANGE UP (ref 1–1.03)
UROBILINOGEN FLD QL: 0.2 E.U./DL — SIGNIFICANT CHANGE UP
WBC # BLD: 14.2 K/UL — HIGH (ref 3.8–10.5)
WBC # FLD AUTO: 14.2 K/UL — HIGH (ref 3.8–10.5)
WBC UR QL: < 5 /HPF — SIGNIFICANT CHANGE UP

## 2017-11-14 PROCEDURE — 71010: CPT | Mod: 26

## 2017-11-14 PROCEDURE — 99223 1ST HOSP IP/OBS HIGH 75: CPT | Mod: GC

## 2017-11-14 PROCEDURE — 70450 CT HEAD/BRAIN W/O DYE: CPT | Mod: 26

## 2017-11-14 PROCEDURE — 99285 EMERGENCY DEPT VISIT HI MDM: CPT

## 2017-11-14 RX ORDER — CEFTRIAXONE 500 MG/1
1 INJECTION, POWDER, FOR SOLUTION INTRAMUSCULAR; INTRAVENOUS EVERY 24 HOURS
Qty: 0 | Refills: 0 | Status: DISCONTINUED | OUTPATIENT
Start: 2017-11-15 | End: 2017-11-15

## 2017-11-14 RX ORDER — SODIUM CHLORIDE 9 MG/ML
1000 INJECTION INTRAMUSCULAR; INTRAVENOUS; SUBCUTANEOUS ONCE
Qty: 0 | Refills: 0 | Status: COMPLETED | OUTPATIENT
Start: 2017-11-14 | End: 2017-11-14

## 2017-11-14 RX ORDER — PREGABALIN 225 MG/1
500 CAPSULE ORAL DAILY
Qty: 0 | Refills: 0 | Status: DISCONTINUED | OUTPATIENT
Start: 2017-11-14 | End: 2017-11-17

## 2017-11-14 RX ORDER — LEVOTHYROXINE SODIUM 125 MCG
75 TABLET ORAL DAILY
Qty: 0 | Refills: 0 | Status: DISCONTINUED | OUTPATIENT
Start: 2017-11-14 | End: 2017-11-17

## 2017-11-14 RX ORDER — AZITHROMYCIN 500 MG/1
500 TABLET, FILM COATED ORAL EVERY 24 HOURS
Qty: 0 | Refills: 0 | Status: DISCONTINUED | OUTPATIENT
Start: 2017-11-15 | End: 2017-11-15

## 2017-11-14 RX ORDER — HEPARIN SODIUM 5000 [USP'U]/ML
5000 INJECTION INTRAVENOUS; SUBCUTANEOUS EVERY 12 HOURS
Qty: 0 | Refills: 0 | Status: DISCONTINUED | OUTPATIENT
Start: 2017-11-14 | End: 2017-11-17

## 2017-11-14 RX ORDER — AZITHROMYCIN 500 MG/1
500 TABLET, FILM COATED ORAL ONCE
Qty: 0 | Refills: 0 | Status: COMPLETED | OUTPATIENT
Start: 2017-11-14 | End: 2017-11-14

## 2017-11-14 RX ORDER — CEFTRIAXONE 500 MG/1
1 INJECTION, POWDER, FOR SOLUTION INTRAMUSCULAR; INTRAVENOUS ONCE
Qty: 0 | Refills: 0 | Status: COMPLETED | OUTPATIENT
Start: 2017-11-14 | End: 2017-11-14

## 2017-11-14 RX ADMIN — SODIUM CHLORIDE 2000 MILLILITER(S): 9 INJECTION INTRAMUSCULAR; INTRAVENOUS; SUBCUTANEOUS at 18:52

## 2017-11-14 RX ADMIN — SODIUM CHLORIDE 2000 MILLILITER(S): 9 INJECTION INTRAMUSCULAR; INTRAVENOUS; SUBCUTANEOUS at 21:54

## 2017-11-14 RX ADMIN — CEFTRIAXONE 100 GRAM(S): 500 INJECTION, POWDER, FOR SOLUTION INTRAMUSCULAR; INTRAVENOUS at 19:33

## 2017-11-14 RX ADMIN — AZITHROMYCIN 255 MILLIGRAM(S): 500 TABLET, FILM COATED ORAL at 20:19

## 2017-11-14 NOTE — ED PROVIDER NOTE - PHYSICAL EXAMINATION
GEN: elderly, thin, frail, NTAF, awake, alert, oriented to person, place, and in no apparent distress.  ENT: Airway patent, Nasal mucosa clear. Mouth with dry mucosa.  EYES: Clear bilaterally.  RESPIRATORY: Breathing comfortably with normal RR. +RLL crackles, no wheezes, no hypoxia, no retractions.   CARDIAC: Regular rate and rhythm  ABDOMEN: Soft, nontender, +bowel sounds, no rebound, rigidity, or guarding.  MSK: Range of motion is not limited, no deformities noted.  NEURO: Alert and oriented x 2. Cn 2-12 intact. Strength 5/5 and sensation intact in all 4 extremities. no pronator drift.   PSYCH: Alert and oriented x 2. normal mood and affect. no apparent risk to self or others.

## 2017-11-14 NOTE — ED PROVIDER NOTE - MEDICAL DECISION MAKING DETAILS
89F with above PMHX with generalized weakness and cough, appears dehdryated and concern for PNA give RLL crackles on exam. Will check labs, CXR and UA, likely admit for IV hydration and abx for CAP. Will also check CT head given recent fall. 89F with above PMHX with generalized weakness and cough, appears dehydrated and concern for PNA give RLL crackles on exam. Will check labs, CXR and UA, likely admit for IV hydration and abx for CAP. Will also check CT head given recent fall.

## 2017-11-14 NOTE — ED ADULT NURSE NOTE - OBJECTIVE STATEMENT
Pt w/ PMH of HTN presents to ED today on referral of PMD c/o 1 week of cough w/ hemoptysis, fatigue and confusion.  Pt has 24 hour care at home w/ [facility].  Pt ambulates at home w/ use of walker, but has grown progressively weaker.  Pt's PCT states expectorate progressed from yellowish sputum to blood tinged over the course of the last 24 hours.  Pt is not on thinners.  Pt is A&Ox3 on exam, but [place].  Pt denies fever, chills, N/V/D, dysuria or pain.  Pt is pending lab results.

## 2017-11-14 NOTE — H&P ADULT - PROBLEM SELECTOR PLAN 3
Pt with generalized weakness likely 2/2 sepsis. No focal deficits on exam, pt mentating well  - PT consult  - c/w hydration

## 2017-11-14 NOTE — H&P ADULT - PROBLEM SELECTOR PLAN 4
Ca elevated to 12.3, was 8.5 in May 2017. Unclear etiology, pt not on thiazides. Possible malignancy?. Pt not complaining of back pain, renal fxn at baseline.   - obtain PTH  - trend BMP  - c/w IVF hydration

## 2017-11-14 NOTE — H&P ADULT - PROBLEM SELECTOR PLAN 8
on Zantac at home  - c/w famotidine equivalent dose on Bisoprolol 5mg at home  - can continue with metoprolol 50mg BID (equivalent dose) - will hold for now in setting of sepsis    #HLD: - verify simvastatin dose and resume home med

## 2017-11-14 NOTE — H&P ADULT - HISTORY OF PRESENT ILLNESS
89F hx of HTN, HLD, Breast CA, GERD, Anxiety/Insomnia brought in by [facility] for generalized weakness and decreased PO intake for 3-4 days. 89F hx of HTN, HLD, Breast CA, GERD, Anxiety/Insomnia brought in by [facility] for cough, generalized weakness and decreased PO intake for 3-4 days. Since Friday, pt has developed productive cough with whitish sputum, associated with runny nose and watery eyes. Pt also has decreased appetite and general weakness noted by [facility]. Pt denies any fever, chills, chest pain, SOB, n/v/d/c, dysuria. No recent hospitalization or Abx use, no sick contacts or recent travels.

## 2017-11-14 NOTE — H&P ADULT - NSHPLABSRESULTS_GEN_ALL_CORE
.  LABS:                         10.0   14.2  )-----------( 315      ( 2017 18:04 )             32.9         133<L>  |  95<L>  |  42<H>  ----------------------------<  132<H>  5.4<H>   |  26  |  1.29    Ca    12.3<H>      2017 18:04  Mg     2.5         TPro  9.1<H>  /  Alb  2.7<L>  /  TBili  0.4  /  DBili  x   /  AST  32  /  ALT  30  /  AlkPhos  80  14    PT/INR - ( 2017 18:04 )   PT: 12.2 sec;   INR: 1.10          PTT - ( 2017 18:04 )  PTT:26.8 sec  Urinalysis Basic - ( 2017 19:55 )    Color: Yellow / Appearance: Clear / S.010 / pH: x  Gluc: x / Ketone: NEGATIVE  / Bili: Negative / Urobili: 0.2 E.U./dL   Blood: x / Protein: Trace mg/dL / Nitrite: POSITIVE   Leuk Esterase: NEGATIVE / RBC: < 5 /HPF / WBC < 5 /HPF   Sq Epi: x / Non Sq Epi: 0-5 /HPF / Bacteria: Many /HPF            Lactate, Blood: 1.1 mmoL/L ( @ 21:55)      RADIOLOGY, EKG & ADDITIONAL TESTS: Reviewed.     Extensive patchy bilateral periventricular white matter   hypodensities consistent with microvascular ischemic white matter   disease. Small hypodensities are noted in bilateral basal ganglia and   bilateral thalami which may represent old infarction

## 2017-11-14 NOTE — H&P ADULT - PROBLEM SELECTOR PLAN 9
Pt underweight, BMI 16.8  - Regular diet  - Nutrition consult on Zantac at home  - c/w famotidine equivalent dose

## 2017-11-14 NOTE — ED PROVIDER NOTE - OBJECTIVE STATEMENT
89F hx of HTN, HLD, Breast CA, GERD, Anxiety/Insomnia 89F hx of HTN, HLD, Breast CA, GERD, Anxiety/Insomnia who was brought in by daughter and aid 89F hx of HTN, HLD, Breast CA, GERD, Anxiety/Insomnia who was brought in by daughter and aid for several days of worsening confusion x 2 weeks associated with cough, generalized weakness and decreased Po intake. Pt was seen by PMD today and sent to ER for evaluation. She has a h/o poor PO leading to severe dehydration. Aid also notes a fall 2 months ago for which she did not have a CT head. No f/c, no CP or SOB, no focal n/t/w in extremities.

## 2017-11-14 NOTE — H&P ADULT - NSHPPHYSICALEXAM_GEN_ALL_CORE
.  VITAL SIGNS:  T(C): 37 (11-15-17 @ 00:51), Max: 37 (11-15-17 @ 00:51)  T(F): 98.6 (11-15-17 @ 00:51), Max: 98.6 (11-15-17 @ 00:51)  HR: 84 (11-15-17 @ 00:51) (84 - 101)  BP: 148/85 (11-15-17 @ 00:51) (126/81 - 148/85)  BP(mean): --  RR: 19 (11-15-17 @ 00:51) (18 - 19)  SpO2: 96% (11-15-17 @ 00:51) (96% - 96%)  Wt(kg): --    PHYSICAL EXAM:    Constitutional: WDWN resting comfortably in bed; NAD  Head: NC/AT  Eyes: clear conjunctiva, b/l cataracts  ENT: no nasal discharge; no oropharyngeal erythema or exudates; very dry oral mucosa  Neck: supple; no JVD or thyromegaly  Respiratory: CTA B/L; no W/R/R, no retractions  Cardiac: +S1/S2; RRR; no M/R/G; PMI non-displaced  Gastrointestinal: soft, NT/ND; no rebound or guarding; +BS, no hepatosplenomegaly  Extremities: WWP, no clubbing or cyanosis; 1+ pitting edema b/l  Vascular: 2+ radial, DP/PT pulses B/L  Lymphatic: no submandibular or cervical LAD  Neurologic: AAOx3; CNII-XII grossly intact; no focal deficits, motor 5/5 in UE and LE, sensation intact to light touch .  VITAL SIGNS:  T(C): 37 (11-15-17 @ 00:51), Max: 37 (11-15-17 @ 00:51)  T(F): 98.6 (11-15-17 @ 00:51), Max: 98.6 (11-15-17 @ 00:51)  HR: 84 (11-15-17 @ 00:51) (84 - 101)  BP: 148/85 (11-15-17 @ 00:51) (126/81 - 148/85)  BP(mean): --  RR: 19 (11-15-17 @ 00:51) (18 - 19)  SpO2: 96% (11-15-17 @ 00:51) (96% - 96%)  Wt(kg): --    PHYSICAL EXAM:    Constitutional: WDWN resting comfortably in bed; NAD  Head: NC/AT  Eyes: clear conjunctiva, b/l cataracts  ENT: no nasal discharge; no oropharyngeal erythema or exudates; very dry oral mucosa  Neck: supple; no JVD or thyromegaly  Respiratory: CTA B/L; right basilar crackles, left lung field clear to auscultation, no rhonchi, no wheezing, no retractions  Cardiac: +S1/S2; RRR; no M/R/G; PMI non-displaced  Gastrointestinal: soft, NT/ND; no rebound or guarding; +BS, no hepatosplenomegaly  Extremities: WWP, no clubbing or cyanosis; 1+ pitting edema b/l  Vascular: 2+ radial, DP/PT pulses B/L  Lymphatic: no submandibular or cervical LAD  Neurologic: AAOx3; CNII-XII grossly intact; no focal deficits, motor 5/5 in UE and LE, sensation intact to light touch .  VITAL SIGNS:  T(C): 37 (11-15-17 @ 00:51), Max: 37 (11-15-17 @ 00:51)  T(F): 98.6 (11-15-17 @ 00:51), Max: 98.6 (11-15-17 @ 00:51)  HR: 84 (11-15-17 @ 00:51) (84 - 101)  BP: 148/85 (11-15-17 @ 00:51) (126/81 - 148/85)  BP(mean): --  RR: 19 (11-15-17 @ 00:51) (18 - 19)  SpO2: 96% (11-15-17 @ 00:51) (96% - 96%)  Wt(kg): --    PHYSICAL EXAM:    Constitutional: WDWN resting comfortably in bed; NAD  Head: NC/AT  Eyes: clear conjunctiva, b/l cataracts  ENT: no nasal discharge; no oropharyngeal erythema or exudates; very dry oral mucosa  Neck: supple; no JVD or thyromegaly  Respiratory: CTA B/L; right basilar crackles, left lung field clear to auscultation, no rhonchi, no wheezing, no retractions  Cardiac: +S1/S2; RRR; no M/R/G; PMI non-displaced  Gastrointestinal: soft, NT/ND; no rebound or guarding; +BS, no hepatosplenomegaly  Extremities: WWP, no clubbing or cyanosis; 1+ pitting edema b/l  Vascular: 2+ radial, DP/PT pulses B/L  Lymphatic: no submandibular or cervical LAD  Neurologic: AAOx3; CNII-XII grossly intact; no focal deficits, motor 5/5 in UE and LE, sensation intact to light touch. .  VITAL SIGNS:  T(C): 37 (11-15-17 @ 00:51), Max: 37 (11-15-17 @ 00:51)  T(F): 98.6 (11-15-17 @ 00:51), Max: 98.6 (11-15-17 @ 00:51)  HR: 84 (11-15-17 @ 00:51) (84 - 101)  BP: 148/85 (11-15-17 @ 00:51) (126/81 - 148/85)  BP(mean): --  RR: 19 (11-15-17 @ 00:51) (18 - 19)  SpO2: 96% (11-15-17 @ 00:51) (96% - 96%)  Wt(kg): --    PHYSICAL EXAM:    Constitutional: WDWN resting comfortably in bed; NAD  Head: NC/AT  Eyes: clear conjunctiva, b/l cataracts  ENT: no nasal discharge; mild oropharyngeal erythema. No exudates; very dry oral mucosa  Neck: supple; no JVD or thyromegaly  Respiratory: CTA B/L; right basilar crackles, left lung field clear to auscultation, no rhonchi, no wheezing, no retractions  Cardiac: +S1/S2; RRR; no M/R/G; PMI non-displaced  Gastrointestinal: soft, NT/ND; no rebound or guarding; +BS, no hepatosplenomegaly  Extremities: WWP, no clubbing or cyanosis; 1+ pitting edema b/l  Vascular: 2+ radial, DP/PT pulses B/L  Lymphatic: no submandibular or cervical LAD  Neurologic: AAOx3; CNII-XII grossly intact; no focal deficits, motor 5/5 in UE and LE, sensation intact to light touch.

## 2017-11-14 NOTE — H&P ADULT - PROBLEM SELECTOR PLAN 6
on Bisoprolol 5mg at home  - can continue with metoprolol 50mg BID (equivalent dose) - will hold for now in setting of sepsis -nutrition consult; check TSH, hypercalcemia, gamma gap workup pending check iron studies, SPEP/ UPEP

## 2017-11-14 NOTE — ED ADULT NURSE NOTE - CHPI ED SYMPTOMS NEG
no headache/no chills/no shortness of breath/no body aches/no diaphoresis/no chest pain/no wheezing/no fever/no edema

## 2017-11-14 NOTE — H&P ADULT - PROBLEM SELECTOR PLAN 1
Pt met sepsis criteria with WBC 14 and . likely  2/2 URI vs. CAP. Pt with viral symptoms, however, RVP negative. CXR negative for infiltrates, however, pt with right basilar crackles on exam. s/p 2L NS in ED  - Lactate wnl, however pt still very dry on exam, will continue with maintenance IVF  - Ceftriaxone and azithromycin for treatment of CAP  - obtain sputum culture and urine legionella Ag  - f/u BCx Pt met sepsis criteria with WBC 14 and . likely  2/2 URI vs. CAP. Pt with viral symptoms, however, RVP negative. CXR negative for infiltrates, however, pt with right basilar crackles on exam. s/p 2L NS in ED  - Lactate wnl, however pt still very dry on exam, will continue with maintenance IVF  - Ceftriaxone and azithromycin for treatment of CAP  - will repeat CXR in AM s/p fluid resuscitation  - obtain sputum culture and urine legionella Ag  - f/u BCx

## 2017-11-14 NOTE — H&P ADULT - NSHPREVIEWOFSYSTEMS_GEN_ALL_CORE
REVIEW OF SYSTEMS:    CONSTITUTIONAL: generalized weakness, no fevers or chills  EYES/ENT: No visual changes;  No vertigo or throat pain   NECK: No pain or stiffness  RESPIRATORY: +cough, whitish sputum. No wheezing, no SOB  CARDIOVASCULAR: No chest pain or palpitations  GASTROINTESTINAL: No abdominal or epigastric pain. No nausea, vomiting, or hematemesis; No diarrhea or constipation. No melena or hematochezia.  GENITOURINARY: No dysuria, frequency or hematuria  NEUROLOGICAL: No numbness or weakness  SKIN: No itching, burning, rashes, or lesions   All other review of systems is negative unless indicated above.

## 2017-11-14 NOTE — H&P ADULT - PROBLEM SELECTOR PLAN 7
- verify simvastatin dose and resume home med check iron studies, SPEP/ UPEP - check TSH in AM  - c/w Synthroid 75mcg daily.

## 2017-11-14 NOTE — H&P ADULT - ATTENDING COMMENTS
pt seen and examined; reviewed vs, labs, CXR  PE findings as above; in addition pt is thin and cachetic appearing; p/w HHA; pt w/ improved sxs since admission per HHA  1. sepsis/ r/o PNA: on azithromycin and ceftriaxone; on IVFs; follow up ctxs; repeat CXR pending; hold BP medications  2. hypercalcemia: monitor BMP, follow up PTH  3. anemia: check SPEP/ UPEP given gamma gap, monitor CBC

## 2017-11-14 NOTE — ED ADULT TRIAGE NOTE - CHIEF COMPLAINT QUOTE
Patient brought in for cough , generalized weakness , poor appetite for 1 week and confusion for 2 weeks . Went to pmd today was sent here for evaluation .

## 2017-11-14 NOTE — H&P ADULT - PROBLEM SELECTOR PLAN 5
- check TSH in AM  - c/w Synthroid 75mcg daily - check TSH in AM  - c/w Synthroid 75mcg daily. -nutrition consult; check TSH, hypercalcemia, gamma gap workup pending

## 2017-11-14 NOTE — H&P ADULT - ASSESSMENT
89F hx of HTN, HLD, Breast CA, GERD, Anxiety/Insomnia brought in by HHA for cough, generalized weakness and decreased PO intake for 3-4 days

## 2017-11-15 DIAGNOSIS — E83.52 HYPERCALCEMIA: ICD-10-CM

## 2017-11-15 DIAGNOSIS — R53.1 WEAKNESS: ICD-10-CM

## 2017-11-15 DIAGNOSIS — K21.9 GASTRO-ESOPHAGEAL REFLUX DISEASE WITHOUT ESOPHAGITIS: ICD-10-CM

## 2017-11-15 DIAGNOSIS — R05 COUGH: ICD-10-CM

## 2017-11-15 DIAGNOSIS — R63.8 OTHER SYMPTOMS AND SIGNS CONCERNING FOOD AND FLUID INTAKE: ICD-10-CM

## 2017-11-15 DIAGNOSIS — E03.9 HYPOTHYROIDISM, UNSPECIFIED: ICD-10-CM

## 2017-11-15 DIAGNOSIS — R64 CACHEXIA: ICD-10-CM

## 2017-11-15 DIAGNOSIS — E78.5 HYPERLIPIDEMIA, UNSPECIFIED: ICD-10-CM

## 2017-11-15 DIAGNOSIS — D64.9 ANEMIA, UNSPECIFIED: ICD-10-CM

## 2017-11-15 DIAGNOSIS — A41.9 SEPSIS, UNSPECIFIED ORGANISM: ICD-10-CM

## 2017-11-15 DIAGNOSIS — Z29.9 ENCOUNTER FOR PROPHYLACTIC MEASURES, UNSPECIFIED: ICD-10-CM

## 2017-11-15 DIAGNOSIS — I10 ESSENTIAL (PRIMARY) HYPERTENSION: ICD-10-CM

## 2017-11-15 LAB
ALBUMIN SERPL ELPH-MCNC: 2.2 G/DL — LOW (ref 3.3–5)
ANION GAP SERPL CALC-SCNC: 10 MMOL/L — SIGNIFICANT CHANGE UP (ref 5–17)
BASOPHILS NFR BLD AUTO: 0.1 % — SIGNIFICANT CHANGE UP (ref 0–2)
BUN SERPL-MCNC: 32 MG/DL — HIGH (ref 7–23)
CALCIUM SERPL-MCNC: 10.7 MG/DL — HIGH (ref 8.4–10.5)
CHLORIDE SERPL-SCNC: 102 MMOL/L — SIGNIFICANT CHANGE UP (ref 96–108)
CO2 SERPL-SCNC: 24 MMOL/L — SIGNIFICANT CHANGE UP (ref 22–31)
CREAT SERPL-MCNC: 0.95 MG/DL — SIGNIFICANT CHANGE UP (ref 0.5–1.3)
EOSINOPHIL NFR BLD AUTO: 0.9 % — SIGNIFICANT CHANGE UP (ref 0–6)
GLUCOSE SERPL-MCNC: 99 MG/DL — SIGNIFICANT CHANGE UP (ref 70–99)
HCT VFR BLD CALC: 25.9 % — LOW (ref 34.5–45)
HGB BLD-MCNC: 7.9 G/DL — LOW (ref 11.5–15.5)
IRON SATN MFR SERPL: 12 % — LOW (ref 14–50)
IRON SATN MFR SERPL: 16 UG/DL — LOW (ref 30–160)
LYMPHOCYTES # BLD AUTO: 7.9 % — LOW (ref 13–44)
MAGNESIUM SERPL-MCNC: 2.1 MG/DL — SIGNIFICANT CHANGE UP (ref 1.6–2.6)
MCHC RBC-ENTMCNC: 29 PG — SIGNIFICANT CHANGE UP (ref 27–34)
MCHC RBC-ENTMCNC: 30.5 G/DL — LOW (ref 32–36)
MCV RBC AUTO: 95.2 FL — SIGNIFICANT CHANGE UP (ref 80–100)
MONOCYTES NFR BLD AUTO: 9.6 % — SIGNIFICANT CHANGE UP (ref 2–14)
NEUTROPHILS NFR BLD AUTO: 81.5 % — HIGH (ref 43–77)
PLATELET # BLD AUTO: 249 K/UL — SIGNIFICANT CHANGE UP (ref 150–400)
POTASSIUM SERPL-MCNC: 4.4 MMOL/L — SIGNIFICANT CHANGE UP (ref 3.5–5.3)
POTASSIUM SERPL-SCNC: 4.4 MMOL/L — SIGNIFICANT CHANGE UP (ref 3.5–5.3)
PROT SERPL-MCNC: 6.4 G/DL — SIGNIFICANT CHANGE UP (ref 6–8.3)
PROT SERPL-MCNC: 6.4 G/DL — SIGNIFICANT CHANGE UP (ref 6–8.3)
RBC # BLD: 2.72 M/UL — LOW (ref 3.8–5.2)
RBC # FLD: 14.4 % — SIGNIFICANT CHANGE UP (ref 10.3–16.9)
SODIUM SERPL-SCNC: 136 MMOL/L — SIGNIFICANT CHANGE UP (ref 135–145)
TIBC SERPL-MCNC: 134 UG/DL — LOW (ref 220–430)
TSH SERPL-MCNC: 0.91 UIU/ML — SIGNIFICANT CHANGE UP (ref 0.35–4.94)
UIBC SERPL-MCNC: 118 UG/DL — SIGNIFICANT CHANGE UP (ref 110–370)
WBC # BLD: 10.5 K/UL — SIGNIFICANT CHANGE UP (ref 3.8–10.5)
WBC # FLD AUTO: 10.5 K/UL — SIGNIFICANT CHANGE UP (ref 3.8–10.5)

## 2017-11-15 PROCEDURE — 93010 ELECTROCARDIOGRAM REPORT: CPT

## 2017-11-15 PROCEDURE — 99233 SBSQ HOSP IP/OBS HIGH 50: CPT

## 2017-11-15 RX ORDER — SODIUM CHLORIDE 9 MG/ML
1000 INJECTION INTRAMUSCULAR; INTRAVENOUS; SUBCUTANEOUS
Qty: 0 | Refills: 0 | Status: DISCONTINUED | OUTPATIENT
Start: 2017-11-15 | End: 2017-11-17

## 2017-11-15 RX ORDER — FAMOTIDINE 10 MG/ML
20 INJECTION INTRAVENOUS DAILY
Qty: 0 | Refills: 0 | Status: DISCONTINUED | OUTPATIENT
Start: 2017-11-15 | End: 2017-11-17

## 2017-11-15 RX ADMIN — PREGABALIN 500 MICROGRAM(S): 225 CAPSULE ORAL at 11:15

## 2017-11-15 RX ADMIN — HEPARIN SODIUM 5000 UNIT(S): 5000 INJECTION INTRAVENOUS; SUBCUTANEOUS at 05:35

## 2017-11-15 RX ADMIN — SODIUM CHLORIDE 70 MILLILITER(S): 9 INJECTION INTRAMUSCULAR; INTRAVENOUS; SUBCUTANEOUS at 17:22

## 2017-11-15 RX ADMIN — Medication 75 MICROGRAM(S): at 05:35

## 2017-11-15 RX ADMIN — FAMOTIDINE 20 MILLIGRAM(S): 10 INJECTION INTRAVENOUS at 11:15

## 2017-11-15 RX ADMIN — SODIUM CHLORIDE 70 MILLILITER(S): 9 INJECTION INTRAMUSCULAR; INTRAVENOUS; SUBCUTANEOUS at 02:23

## 2017-11-15 RX ADMIN — HEPARIN SODIUM 5000 UNIT(S): 5000 INJECTION INTRAVENOUS; SUBCUTANEOUS at 17:22

## 2017-11-15 NOTE — PHYSICAL THERAPY INITIAL EVALUATION ADULT - MODIFIED CLINICAL TEST OF SENSORY INTEGRATION IN BALANCE TEST
B static stance unable to maintain without rolling walker and Mod A with max verbal and tactile cues. Tolerated standing ~1 min.

## 2017-11-15 NOTE — DIETITIAN INITIAL EVALUATION ADULT. - PHYSICAL APPEARANCE
Noted very frail thin/poor skin turgor.Noteable lack of muscle and fat in arms.Prominent bone on clavical bone region.Patient thinks she has lost weight,but visitors could not quantify amount of weight loss.Skin intact/debilitated

## 2017-11-15 NOTE — PHYSICAL THERAPY INITIAL EVALUATION ADULT - PERTINENT HX OF CURRENT PROBLEM, REHAB EVAL
89F hx of HTN, HLD, Breast CA, GERD, Anxiety/Insomnia brought in by HHA for cough, generalized weakness and decreased PO intake for 3-4 days and functional decline ~ several weeks.

## 2017-11-15 NOTE — PHYSICAL THERAPY INITIAL EVALUATION ADULT - IMPAIRMENTS FOUND, PT EVAL
muscle strength/aerobic capacity/endurance/gait, locomotion, and balance/poor safety awareness/gross motor

## 2017-11-15 NOTE — PROGRESS NOTE ADULT - PROBLEM SELECTOR PLAN 1
Pt met sepsis criteria with WBC 14 and . likely  2/2 URI vs. CAP. Pt with viral symptoms, however, RVP negative. CXR negative for infiltrates, however, pt with right basilar crackles on exam. s/p 2L NS in ED  - Lactate wnl, however pt still very dry on exam, will continue with maintenance IVF  -  will start po Levofloxacin 75 qd today (currently on Ceftriaxone and azithromycin for treatment of CAP)  - will repeat CXR in AM s/p fluid resuscitation  - obtain sputum culture and urine legionella Ag  - f/u BCx Pt met sepsis criteria with WBC 14 and . likely  2/2 URI vs. CAP. Pt with viral symptoms, however, RVP negative. CXR negative for infiltrates, however, pt with right basilar crackles on exam. s/p 2L NS in ED  - Lactate wnl, however pt still very dry on exam, will continue with maintenance IVF  -  will start po Levofloxacin 75 q48 hrs today (currently on Ceftriaxone and azithromycin for treatment of CAP)  - will repeat CXR in AM s/p fluid resuscitation  - obtain sputum culture and urine legionella Ag  - f/u BCx Pt met sepsis criteria with WBC 14 and . likely  2/2 URI vs. CAP. Pt with viral symptoms, however, RVP negative. CXR negative for infiltrates, however, pt with right basilar crackles on exam. s/p 2L NS in ED  - Lactate wnl, however pt still very dry on exam, will continue with maintenance IVF  -  CAP - will start po Levofloxacin 750 q48 hrs today (dc'ed IV Ceftriaxone and azithromycin)  - will repeat CXR in AM   - f/u sputum culture and urine legionella Ag  - f/u BCx

## 2017-11-15 NOTE — SWALLOW BEDSIDE ASSESSMENT ADULT - SLP GENERAL OBSERVATIONS
Pt was received alert in room w family at bedside. Pt reported feeling PO stuck at level of throat and chest. Per HHA, pt coughed w PO at home, and also coughed on a modified diet during lunch.

## 2017-11-15 NOTE — PHYSICAL THERAPY INITIAL EVALUATION ADULT - GAIT DEVIATIONS NOTED, PT EVAL
Pt unable to maintain static stance without Mod A, unable to maintain COG over feet, B static stance with excess inversion and poor walker management/decreased austyn/increased time in double stance/decreased velocity of limb motion

## 2017-11-15 NOTE — DIETITIAN INITIAL EVALUATION ADULT. - PROBLEM SELECTOR PLAN 8
on Bisoprolol 5mg at home  - can continue with metoprolol 50mg BID (equivalent dose) - will hold for now in setting of sepsis    #HLD: - verify simvastatin dose and resume home med

## 2017-11-15 NOTE — PROGRESS NOTE ADULT - PROBLEM SELECTOR PLAN 4
Ca elevated to 12.3, was 8.5 in May 2017. Unclear etiology, pt not on thiazides. Pt w/history of Possible malignancy?. Pt not complaining of back pain, renal fxn at baseline.   -f/u PTH  - obtain PTH  - trend BMP  - c/w IVF hydration Ca elevated to 12.3, was 8.5 in May 2017. Unclear etiology, pt not on thiazides. Pt w/history of Possible malignancy?. Pt not complaining of back pain, renal fxn at baseline.   -f/u PTH  - trend BMP  - c/w IVF hydration

## 2017-11-15 NOTE — PHYSICAL THERAPY INITIAL EVALUATION ADULT - CRITERIA FOR SKILLED THERAPEUTIC INTERVENTIONS
functional limitations in following categories/therapy frequency/anticipated discharge recommendation/risk reduction/prevention/anticipated equipment needs at discharge/impairments found/rehab potential/predicted duration of therapy intervention

## 2017-11-15 NOTE — PROGRESS NOTE ADULT - SUBJECTIVE AND OBJECTIVE BOX
INTERVAL HPI/OVERNIGHT EVENTS:  Patient was seen and examined at bedside. As per nurse, 24hr aide and patient, no o/n events, patient resting comfortably and denies having any discomfort. She still has cough and feels weak. Patient denies: fever, chills, HA, Changes in vision, CP, palpitations, SOB, N/V/D/C, dysuria, changes in bowel movements, LE edema.      VITAL SIGNS:  Vital Signs Last 24 Hrs  T(C): 36.4 (15 Nov 2017 08:45), Max: 37 (15 Nov 2017 00:51)  T(F): 97.5 (15 Nov 2017 08:45), Max: 98.6 (15 Nov 2017 00:51)  HR: 80 (15 Nov 2017 08:45) (77 - 101)  BP: 136/69 (15 Nov 2017 08:45) (126/70 - 148/85)  BP(mean): --  RR: 20 (15 Nov 2017 08:45) (18 - 20)  SpO2: 95% (15 Nov 2017 08:45) (95% - 98%)    PHYSICAL EXAM:    Constitutional: cachectic elderly F in NAD  Eyes: PERRL, EOMI, sclera non-icteric  Throat: scaly brown discoloration of the tongue, whitish discharge at the L corner of the mouth  Neck: supple, trachea midline, no masses, no JVD, no bruits  Respiratory: R base crackles   Cardiovascular: RRR, normal S1S2, no M/R/G  Gastrointestinal: soft, NTND, no masses palpable, BS normal  Extremities: Warm, well perfused, purpuric discoloration at anterior shin surface bl (chronic per aide), also multiple squamous lesions below the knees bilaterally  Neurological: AAOx3 although pt hesitates, CN Grossly intact, BARRERA  Skin: see above   Psych: no evidence of behavioral disturbance    MEDICATIONS  (STANDING):  azithromycin  IVPB 500 milliGRAM(s) IV Intermittent every 24 hours  cefTRIAXone   IVPB 1 Gram(s) IV Intermittent every 24 hours  cyanocobalamin 500 MICROGram(s) Oral daily  famotidine    Tablet 20 milliGRAM(s) Oral daily  heparin  Injectable 5000 Unit(s) SubCutaneous every 12 hours  levothyroxine 75 MICROGram(s) Oral daily  sodium chloride 0.9%. 1000 milliLiter(s) (70 mL/Hr) IV Continuous <Continuous>    MEDICATIONS  (PRN):      Allergies    No Known Allergies    Intolerances        LABS:                        7.9    10.5  )-----------( 249      ( 15 Nov 2017 07:10 )             25.9     -15    136  |  102  |  32<H>  ----------------------------<  99  4.4   |  24  |  0.95    Ca    10.7<H>      15 Nov 2017 07:10  Mg     2.1     11-15    TPro  x   /  Alb  2.2<L>  /  TBili  x   /  DBili  x   /  AST  x   /  ALT  x   /  AlkPhos  x   11-15    PT/INR - ( 2017 18:04 )   PT: 12.2 sec;   INR: 1.10          PTT - ( 2017 18:04 )  PTT:26.8 sec  Urinalysis Basic - ( 2017 19:55 )    Color: Yellow / Appearance: Clear / S.010 / pH: x  Gluc: x / Ketone: NEGATIVE  / Bili: Negative / Urobili: 0.2 E.U./dL   Blood: x / Protein: Trace mg/dL / Nitrite: POSITIVE   Leuk Esterase: NEGATIVE / RBC: < 5 /HPF / WBC < 5 /HPF   Sq Epi: x / Non Sq Epi: 0-5 /HPF / Bacteria: Many /HPF        RADIOLOGY & ADDITIONAL TESTS:

## 2017-11-15 NOTE — SWALLOW BEDSIDE ASSESSMENT ADULT - ADDITIONAL RECOMMENDATIONS
1) Given pt's advanced age and unintentional weight loss, pt may benefit from a Geriatrics consult  2) This SVC will f/u

## 2017-11-15 NOTE — SWALLOW BEDSIDE ASSESSMENT ADULT - COMMENTS
89F hx of HTN, HLD, Breast CA, GERD, Anxiety/Insomnia brought in by HHA for cough, generalized weakness, confusion, and decreased PO intake for about a week found to be septic + new R lower lobe infiltrate    Per family at bedside, pt has lost weight unintentionally over the past 5 years (120 lbs to ~87 lbs). Pt stated that she has low appetite now.

## 2017-11-15 NOTE — SWALLOW BEDSIDE ASSESSMENT ADULT - PHARYNGEAL PHASE
Suspect grossly timely swallow trigger. No overt s/s of airway protection deficits were observed. No multiple swallows.

## 2017-11-15 NOTE — SWALLOW BEDSIDE ASSESSMENT ADULT - SWALLOW EVAL: DIAGNOSIS
Pt appeared to tolerate PO trials during this clinical eval, but given MD concerns, HHA reports of coughing w meals at home and pt's c/o globus, pt would benefit from general aspiration precautions and further evaluation of swallow via VFSS.

## 2017-11-15 NOTE — PHYSICAL THERAPY INITIAL EVALUATION ADULT - PLANNED THERAPY INTERVENTIONS, PT EVAL
balance training/gait training/neuromuscular re-education/motor coordination training/bed mobility training/transfer training

## 2017-11-15 NOTE — DIETITIAN INITIAL EVALUATION ADULT. - ENERGY NEEDS
BMI:16.8 IBW:88.6-119.5 for this elderly female.91% of IBW.Increased protein and nutrient needs due to suspected weight loss and suspected poor diet PTA

## 2017-11-15 NOTE — PHYSICAL THERAPY INITIAL EVALUATION ADULT - GENERAL OBSERVATIONS, REHAB EVAL
Pt rcvd sleeping supine, +R IV with private HHA bedside. Pt reports fatigue, agreeable to session. Pt tolerated session poorly with impaired standing balance and coordination. Pt and aide endorse functional decline ~ few weeks and recent falls in previous week while standing at countertop. Pt left as found, aide present, in NAD.

## 2017-11-15 NOTE — PHYSICAL THERAPY INITIAL EVALUATION ADULT - PATIENT/FAMILY AGREES WITH PLAN
pt resistant to stay overnight at [facility], more agreeable with education/yes

## 2017-11-15 NOTE — PHYSICAL THERAPY INITIAL EVALUATION ADULT - ADDITIONAL COMMENTS
Pt lives in elevator building with 2 steps in lobby with . Pt has rolling walker which she used at baseline in apartment and out side. Navigates in community with HHA, can tolerate ~ 1 block walking at a time. HHA 12hours/day, 5 days a week. No showerchair, HHA states pt could benefit from shower chair.

## 2017-11-15 NOTE — DIETITIAN INITIAL EVALUATION ADULT. - NS AS NUTRI INTERV MEALS SNACK
General/healthful diet/Texture-modified diet/Mineral - modified diet/change to mechanical soft diet with ensure enlive chocolate only BID and request addition of MVI l/Energy - modified diet/Protein - modified diet/Vitamin - modified diet/Specific foods/beverages or groups

## 2017-11-15 NOTE — DIETITIAN INITIAL EVALUATION ADULT. - OTHER INFO
89 y/o frail/thin female admitted due to weakness and reported diminished po intake.As per HHA, patient has been taking mostly fluids including ensure x2 at home.Generally eats very little and requires chopped foods.No N/V/D reported.Some back pain reported.Skin intact but increased risk of breakdown due to thin paper like skin.

## 2017-11-15 NOTE — PROGRESS NOTE ADULT - ASSESSMENT
89F hx of HTN, HLD, Breast CA, GERD, Anxiety/Insomnia brought in by HHA for cough, generalized weakness, confusion, and decreased PO intake for about a week found to be septic + new R lower lobe infiltrate

## 2017-11-15 NOTE — PHYSICAL THERAPY INITIAL EVALUATION ADULT - COORDINATION ASSESSED, REHAB EVAL
finger to nose/FNF slow with eyes open, 1/3 accuracy eyes closed, heel shin with decreased coordination/heel to shin

## 2017-11-15 NOTE — PROGRESS NOTE ADULT - PROBLEM SELECTOR PLAN 10
Pt underweight, BMI 16.8  - Regular diet  - Nutrition consult - Ensure Enlive® chocolate only BID  pt with tongue lesion and significant weight loss 2/2 poor po intake    HSQ    FULL CODE Pt underweight, BMI 16.8  - Mechanical soft diet, comfort feeds. Pt likely aspirating - awaiting s&s assessment  - Nutrition consult - Ensure Enlive® chocolate only BID    pt with tongue lesion and significant weight loss 2/2 poor po intake  PT recommending JUMA  HSQ    DNR/DNI

## 2017-11-16 ENCOUNTER — TRANSCRIPTION ENCOUNTER (OUTPATIENT)
Age: 82
End: 2017-11-16

## 2017-11-16 ENCOUNTER — APPOINTMENT (OUTPATIENT)
Dept: OPHTHALMOLOGY | Facility: CLINIC | Age: 82
End: 2017-11-16

## 2017-11-16 LAB
-  AMPICILLIN/SULBACTAM: SIGNIFICANT CHANGE UP
-  AMPICILLIN: SIGNIFICANT CHANGE UP
-  CEFAZOLIN: SIGNIFICANT CHANGE UP
-  CEFTRIAXONE: SIGNIFICANT CHANGE UP
-  CIPROFLOXACIN: SIGNIFICANT CHANGE UP
-  GENTAMICIN: SIGNIFICANT CHANGE UP
-  NITROFURANTOIN: SIGNIFICANT CHANGE UP
-  PIPERACILLIN/TAZOBACTAM: SIGNIFICANT CHANGE UP
-  TOBRAMYCIN: SIGNIFICANT CHANGE UP
-  TRIMETHOPRIM/SULFAMETHOXAZOLE: SIGNIFICANT CHANGE UP
ALBUMIN SERPL ELPH-MCNC: 2.3 G/DL — LOW (ref 3.3–5)
ALP SERPL-CCNC: 64 U/L — SIGNIFICANT CHANGE UP (ref 40–120)
ALT FLD-CCNC: 20 U/L — SIGNIFICANT CHANGE UP (ref 10–45)
ANION GAP SERPL CALC-SCNC: 12 MMOL/L — SIGNIFICANT CHANGE UP (ref 5–17)
AST SERPL-CCNC: 20 U/L — SIGNIFICANT CHANGE UP (ref 10–40)
BILIRUB SERPL-MCNC: 0.3 MG/DL — SIGNIFICANT CHANGE UP (ref 0.2–1.2)
BUN SERPL-MCNC: 24 MG/DL — HIGH (ref 7–23)
CALCIUM SERPL-MCNC: 10.4 MG/DL — SIGNIFICANT CHANGE UP (ref 8.4–10.5)
CALCIUM SERPL-MCNC: 11.2 MG/DL — HIGH (ref 8.4–10.5)
CHLORIDE SERPL-SCNC: 98 MMOL/L — SIGNIFICANT CHANGE UP (ref 96–108)
CO2 SERPL-SCNC: 23 MMOL/L — SIGNIFICANT CHANGE UP (ref 22–31)
CREAT SERPL-MCNC: 0.94 MG/DL — SIGNIFICANT CHANGE UP (ref 0.5–1.3)
CULTURE RESULTS: SIGNIFICANT CHANGE UP
GLUCOSE SERPL-MCNC: 102 MG/DL — HIGH (ref 70–99)
HCT VFR BLD CALC: 26.1 % — LOW (ref 34.5–45)
HGB BLD-MCNC: 8.4 G/DL — LOW (ref 11.5–15.5)
MAGNESIUM SERPL-MCNC: 1.6 MG/DL — SIGNIFICANT CHANGE UP (ref 1.6–2.6)
MCHC RBC-ENTMCNC: 29.6 PG — SIGNIFICANT CHANGE UP (ref 27–34)
MCHC RBC-ENTMCNC: 32.2 G/DL — SIGNIFICANT CHANGE UP (ref 32–36)
MCV RBC AUTO: 91.9 FL — SIGNIFICANT CHANGE UP (ref 80–100)
METHOD TYPE: SIGNIFICANT CHANGE UP
ORGANISM # SPEC MICROSCOPIC CNT: SIGNIFICANT CHANGE UP
ORGANISM # SPEC MICROSCOPIC CNT: SIGNIFICANT CHANGE UP
PLATELET # BLD AUTO: 257 K/UL — SIGNIFICANT CHANGE UP (ref 150–400)
POTASSIUM SERPL-MCNC: 3.4 MMOL/L — LOW (ref 3.5–5.3)
POTASSIUM SERPL-SCNC: 3.4 MMOL/L — LOW (ref 3.5–5.3)
PROT SERPL-MCNC: 7 G/DL — SIGNIFICANT CHANGE UP (ref 6–8.3)
PTH-INTACT FLD-MCNC: 3 PG/ML — LOW (ref 15–65)
RBC # BLD: 2.84 M/UL — LOW (ref 3.8–5.2)
RBC # FLD: 14.5 % — SIGNIFICANT CHANGE UP (ref 10.3–16.9)
SODIUM SERPL-SCNC: 133 MMOL/L — LOW (ref 135–145)
SPECIMEN SOURCE: SIGNIFICANT CHANGE UP
WBC # BLD: 10 K/UL — SIGNIFICANT CHANGE UP (ref 3.8–10.5)
WBC # FLD AUTO: 10 K/UL — SIGNIFICANT CHANGE UP (ref 3.8–10.5)

## 2017-11-16 PROCEDURE — 71010: CPT | Mod: 26

## 2017-11-16 PROCEDURE — 99233 SBSQ HOSP IP/OBS HIGH 50: CPT

## 2017-11-16 PROCEDURE — 74230 X-RAY XM SWLNG FUNCJ C+: CPT | Mod: 26

## 2017-11-16 RX ORDER — POTASSIUM CHLORIDE 20 MEQ
10 PACKET (EA) ORAL
Qty: 0 | Refills: 0 | Status: DISCONTINUED | OUTPATIENT
Start: 2017-11-16 | End: 2017-11-16

## 2017-11-16 RX ORDER — METOPROLOL TARTRATE 50 MG
50 TABLET ORAL
Qty: 0 | Refills: 0 | Status: DISCONTINUED | OUTPATIENT
Start: 2017-11-16 | End: 2017-11-17

## 2017-11-16 RX ORDER — MAGNESIUM SULFATE 500 MG/ML
1 VIAL (ML) INJECTION ONCE
Qty: 0 | Refills: 0 | Status: COMPLETED | OUTPATIENT
Start: 2017-11-16 | End: 2017-11-16

## 2017-11-16 RX ADMIN — PREGABALIN 500 MICROGRAM(S): 225 CAPSULE ORAL at 11:48

## 2017-11-16 RX ADMIN — SODIUM CHLORIDE 70 MILLILITER(S): 9 INJECTION INTRAMUSCULAR; INTRAVENOUS; SUBCUTANEOUS at 19:01

## 2017-11-16 RX ADMIN — FAMOTIDINE 20 MILLIGRAM(S): 10 INJECTION INTRAVENOUS at 11:48

## 2017-11-16 RX ADMIN — Medication 100 MILLIEQUIVALENT(S): at 09:30

## 2017-11-16 RX ADMIN — Medication 50 MILLIGRAM(S): at 17:09

## 2017-11-16 RX ADMIN — Medication 75 MICROGRAM(S): at 05:40

## 2017-11-16 RX ADMIN — Medication 100 MILLIEQUIVALENT(S): at 11:47

## 2017-11-16 RX ADMIN — HEPARIN SODIUM 5000 UNIT(S): 5000 INJECTION INTRAVENOUS; SUBCUTANEOUS at 17:09

## 2017-11-16 RX ADMIN — SODIUM CHLORIDE 70 MILLILITER(S): 9 INJECTION INTRAMUSCULAR; INTRAVENOUS; SUBCUTANEOUS at 07:05

## 2017-11-16 RX ADMIN — Medication 100 GRAM(S): at 13:35

## 2017-11-16 RX ADMIN — HEPARIN SODIUM 5000 UNIT(S): 5000 INJECTION INTRAVENOUS; SUBCUTANEOUS at 05:40

## 2017-11-16 NOTE — SWALLOW VFSS/MBS ASSESSMENT ADULT - DIAGNOSTIC IMPRESSIONS
Pt p/w mild oropharyngeal dysphagia characterized by difficulty w oral manipulation/transport of bolus, delayed swallow initiation and reduced strength of swallow. Trace transient  penetration noted w thin and nectar thick liquids. Airway protection was adequate. No alley aspiration noted across consistencies. Reduced strength of swallow, resulted in mild post-deglutitive residue, but was cleared w self-initiated secondary swallow and did not impact the overall safety/function of her swallow. Given these observations, it is recommended that the pt continue her baseline diet w safe eating strategies. Pt p/w mild oropharyngeal dysphagia characterized by difficulty w oral manipulation/transport of bolus, delayed swallow initiation and reduced strength of swallow. Trace transient  penetration noted w thin and nectar thick liquids. Airway protection was adequate. No alley aspiration noted across consistencies. Reduced strength of swallow, resulted in mild post-deglutitive residue, but was cleared w secondary swallow and did not impact the overall safety/function of her swallow. Given these observations, it is recommended that the pt continue her baseline diet w safe eating strategies.

## 2017-11-16 NOTE — PROGRESS NOTE ADULT - PROBLEM SELECTOR PLAN 1
Pt met sepsis criteria with WBC 14 and . likely  2/2 URI vs. CAP. Pt with viral symptoms, however, RVP negative. CXR negative for infiltrates, however, pt with right basilar crackles on exam. s/p 2L NS in ED  - pt dehydrated on presentation - will c/w IVF  -  CAP - will start po Levofloxacin 750 q48 hrs  - CXR today pending official read,    - f/u sputum culture and urine legionella Ag  - f/u BCx - NGTD Resolving, now afebrile and stable.   Pt met sepsis criteria with WBC 14 and . likely  2/2 URI vs. CAP. Pt with viral symptoms, however, RVP negative. CXR negative for infiltrates, however, pt with right basilar crackles on exam. s/p 2L NS in ED  - pt dehydrated on presentation - will c/w IVF  -  CAP - will start po Levofloxacin 750 q48 hrs (last dose on 11/19)  - CXR today with "Persistent obscuration of the left hemidiaphragm and costophrenic angle, as described on the prior chest radiograph. Expansile lytic lesion involving the right lateral sixth rib. Findings concerning for an aggressive lesion, possibly a metastatic focus".   - f/u sputum culture and urine legionella Ag  - f/u BCx - NGTD

## 2017-11-16 NOTE — SWALLOW VFSS/MBS ASSESSMENT ADULT - SLP GENERAL OBSERVATIONS
Pt was alert & seated upright for this study. She participated w all PO trials. Pt was alert & seated upright for this study. She participated w all PO trials. She did not cough during this study and reported tolerance of all PO trials.

## 2017-11-16 NOTE — DISCHARGE NOTE ADULT - SECONDARY DIAGNOSIS.
Sepsis Hypercalcemia Hypertension Hyperlipidemia GERD (gastroesophageal reflux disease) Anxiety Prerenal acute renal failure Normocytic anemia Asymptomatic bacteriuria Severe protein-calorie malnutrition Lytic lesion of bone on x-ray

## 2017-11-16 NOTE — SWALLOW VFSS/MBS ASSESSMENT ADULT - RECOMMENDED FEEDING/EATING TECHNIQUES
position upright (90 degrees)/alternate food with liquid/maintain upright posture during/after eating for 30 mins/small sips/bites

## 2017-11-16 NOTE — PROGRESS NOTE ADULT - PROBLEM SELECTOR PLAN 9
on Zantac at home  - c/w famotidine equivalent dose
on Zantac at home  - c/w famotidine equivalent dose

## 2017-11-16 NOTE — DISCHARGE NOTE ADULT - MEDICATION SUMMARY - MEDICATIONS TO TAKE
I will START or STAY ON the medications listed below when I get home from the hospital:    simvastatin 10 mg oral tablet  -- 1 tab(s) by mouth once a day (at bedtime)  -- Indication: For High cholesterol    zolpidem 5 mg oral tablet  -- 1 tab(s) by mouth once a day (at bedtime), As Needed  -- Indication: For Difficulty sleeping    metoprolol tartrate 50 mg oral tablet  -- 1 tab(s) by mouth 2 times a day  -- Indication: For Hypertension    famotidine 20 mg oral tablet  -- 1 tab(s) by mouth once a day  -- Indication: For Acid reflux    Colace 50 mg oral capsule  -- 1 cap(s) by mouth 2 times a day, As Needed for constipation  -- Indication: For Constipation    sodium chloride, hypertonic 5% ophthalmic solution  -- 1 application to each affected eye 3 times a day  -- Indication: For dry eyes    Synthroid 75 mcg (0.075 mg) oral tablet  -- 1 tab(s) by mouth once a day  -- Indication: For Hypothyroidism    Vitamin B12 500 mcg oral tablet  -- 1 tab(s) by mouth once a day  -- Indication: For B12 deficiency

## 2017-11-16 NOTE — SWALLOW VFSS/MBS ASSESSMENT ADULT - ORAL PHASE COMMENTS
Reduced efficiency of bolus formation and A-P transport at times. Oral coordination appeared most challenged by mech soft solids. Posterior premature spillage w liquids and solids.

## 2017-11-16 NOTE — PROGRESS NOTE ADULT - PROBLEM SELECTOR PLAN 4
Ca elevated to 12.3, was 8.5 in May 2017. Unclear etiology, pt not on thiazides. Pt w/history of Possible malignancy?. Pt not complaining of back pain, renal fxn at baseline.   -f/u PTHrP (PTH low at 3; Calcium 11.2 today)  - trend BMP  - c/w IVF hydration Ca elevated to 12.3, was 8.5 in May 2017. Unclear etiology, pt ON THIAZIDES (per pharmacy, on comb bisoprolol/HCTZ). Pt w/history of Possible malignancy (on CXR 11/16 6th rib lytic lesion suggesting a met). Pt not complaining of back pain, renal fxn at baseline.   -f/u PTHrP (PTH low at 3; Calcium corrected is 11.76 today)  - trend BMP  - c/w IVF hydration

## 2017-11-16 NOTE — SWALLOW VFSS/MBS ASSESSMENT ADULT - COMMENTS
Please note that this study is a snap-shot in time of the pt's swallowing function and cannot be used to rule out aspiration across all meals. Swallowing is a dynamic process that can change from [id number] and from [id number].

## 2017-11-16 NOTE — DISCHARGE NOTE ADULT - CARE PLAN
Principal Discharge DX:	Pneumonia  Goal:	resolution, no cough  Instructions for follow-up, activity and diet:	You came to the hospital and were found to have pneumonia. You received a course of antibiotics consisting of taking Levofloxacin every other day (your last dose will be on the 19th of November to complete a total of 3 doses/6days). Your pneumonia has responded appropriately to antibioticotherapy.  Secondary Diagnosis:	Sepsis  Goal:	resolution  Instructions for follow-up, activity and diet:	You were found to be septic (it occurs when chemicals released into the bloodstream to fight the infection trigger inflammatory responses throughout the body). This state has responded well to the antibiotics and you are no longer septic. Please continue taking antibiotics as prescribed and follow up with your outpatient provider.  Secondary Diagnosis:	Hypercalcemia  Goal:	normal levels of calcium  Instructions for follow-up, activity and diet:	You were found to be hypercalcemic (high levels of calcium in your blood) which might have been caused by dehydration and or some of the medications you have been taking. We ordered more tests and are waiting for some of the results. Please follow up with your outpatient provider to discuss the results of the remaining studies.  Secondary Diagnosis:	Hypertension  Instructions for follow-up, activity and diet:	You have a long standing diagnosis of hypertension and have been taking medications to control your blood pressure. We held those medications while you were septic. Please continue taking the medications and follow up with your outpatient provider.  Secondary Diagnosis:	Hyperlipidemia  Instructions for follow-up, activity and diet:	You have an existing diagnosis of hyperlipidemia. Please continue taking simvastatin and follow up with your outpatient provider.  Secondary Diagnosis:	GERD (gastroesophageal reflux disease)  Goal:	no GERD  Instructions for follow-up, activity and diet:	Please continue taking Nexium and follow up with your outpatient provider.  Secondary Diagnosis:	Anxiety Principal Discharge DX:	Metabolic encephalopathy  Goal:	resolution  Instructions for follow-up, activity and diet:	You came to the hospital because you became confused. A number of processes could cause this - hypercalcemia, dehydration, infection are some of them.   You received antibiotics, intravenous hydration, as well as your home medications and your confusion has been gradually improving.    You were also found to have a lesion in one of your ribs on a chest x-ray. A number of processes could cause this - you will need to follow up with your outpatient provider to discuss your options regarding further diagnostic steps and available treatment.  Secondary Diagnosis:	Sepsis  Goal:	resolution  Instructions for follow-up, activity and diet:	You were found to be septic likely due to pneumonia (it occurs when chemicals released into the bloodstream to fight the infection trigger inflammatory responses throughout the body). This state has responded well to intravenous hydration and the antibiotics and you are no longer septic. Please follow up with your outpatient provider.  Secondary Diagnosis:	Hypercalcemia  Goal:	normal levels of calcium  Instructions for follow-up, activity and diet:	You were found to be hypercalcemic (high levels of calcium in your blood) which might have been caused by dehydration and or some of the medications you have been taking. We ordered more tests and are waiting for some of the results. Please follow up with your outpatient provider to discuss the results of the remaining studies.  Secondary Diagnosis:	Hypertension  Goal:	well controlled blood pressure  Instructions for follow-up, activity and diet:	You have a long standing diagnosis of hypertension and have been taking medications to control your blood pressure. We held those medications while you were septic. Please continue taking the medications and follow up with your outpatient provider.  Secondary Diagnosis:	Hyperlipidemia  Instructions for follow-up, activity and diet:	You have an existing diagnosis of hyperlipidemia. Please continue taking simvastatin and follow up with your outpatient provider.  Secondary Diagnosis:	GERD (gastroesophageal reflux disease)  Goal:	no GERD  Instructions for follow-up, activity and diet:	Please continue taking Nexium and follow up with your outpatient provider.  Secondary Diagnosis:	Anxiety  Goal:	no anxiety, no insomnia  Instructions for follow-up, activity and diet:	You have an existing diagnosis of anxiety and insomnia. Please continue taking your medications and follow up with your PCP and/or psychiatrist. Principal Discharge DX:	Metabolic encephalopathy  Goal:	resolution  Instructions for follow-up, activity and diet:	You came to the hospital because you became confused. A number of processes could cause this - hypercalcemia, dehydration, infection are some of them.   You received antibiotics, intravenous hydration, as well as your home medications and your confusion has been gradually improving.    You were also found to have a lesion in one of your ribs on a chest x-ray. A number of processes could cause this - you will need to follow up with your outpatient provider to discuss your options regarding further diagnostic steps and available treatment.  Secondary Diagnosis:	Sepsis  Goal:	resolution  Instructions for follow-up, activity and diet:	You were found to be septic likely due to pneumonia (it occurs when chemicals released into the bloodstream to fight the infection trigger inflammatory responses throughout the body). This state has responded well to intravenous hydration and the antibiotics and you are no longer septic. Please follow up with your outpatient provider.  Secondary Diagnosis:	Prerenal acute renal failure  Goal:	resolution  Instructions for follow-up, activity and diet:	You were found to have some damage to your kidneys during current hospitalization. This is typically a transient condition and your kidney function has been steadily improving. A number of conditions could have contributed to this - dehydration and sepsis are some of them.  Secondary Diagnosis:	Normocytic anemia  Goal:	resolution  Instructions for follow-up, activity and diet:	You were found to have low levels of iron and also low hemoglobin and red blood cells. This was likely caused by not eating enough, but there could also be other reason for you to be anemic. Please follow up with your PCP to determine if additional tests are required and what type of treatment will address this condition best.  Secondary Diagnosis:	Asymptomatic bacteriuria  Instructions for follow-up, activity and diet:	You were found to have bacteria in the urine but have no symptoms. This did not need to be treated and you did not receive any antibiotics.  Secondary Diagnosis:	Hypercalcemia  Goal:	normal levels of calcium  Instructions for follow-up, activity and diet:	You were found to be hypercalcemic (high levels of calcium in your blood) which might have been caused by dehydration and or some of the medications you have been taking. We ordered more tests and are waiting for some of the results. Please follow up with your outpatient provider to discuss the results of the remaining studies.  Secondary Diagnosis:	Severe protein-calorie malnutrition  Goal:	weight gain, BMI > 18  Instructions for follow-up, activity and diet:	You were found to be underweight. This was caused by not eating because of difficulty swallowing. You were seen by a nutritionist and your diet was adjusted. Please follow up with your gastroenterologist and primary care doctor. Principal Discharge DX:	Metabolic encephalopathy  Goal:	resolution  Instructions for follow-up, activity and diet:	You came to the hospital because you became confused. A number of processes could cause this - hypercalcemia, dehydration, infection are some of them. You received antibiotics, intravenous hydration, as well as your home medications and your confusion has been gradually improving.  You had a CT scan of your head in the hospital which did not show any stroke or bleed, but did show some age-related disease.  Please continue to drink plenty of water and eat well to help prevent future episodes. If you develop any repeat episodes of confusion, like the one you came with, please call your regular doctor, the doctor at [facility], or return to the hospital.  Secondary Diagnosis:	Sepsis  Goal:	resolution  Instructions for follow-up, activity and diet:	You were found to be septic likely due to pneumonia (it occurs when chemicals released into the bloodstream to fight the infection trigger inflammatory responses throughout the body). This state has responded well to intravenous hydration and the antibiotics and you are no longer septic. Please follow up with your outpatient provider. If you develop any new fevers, chills, fast heart beat, new cough, or other symptoms, please see your regular doctor, the doctor at [facility], or return to the hospital.  Secondary Diagnosis:	Prerenal acute renal failure  Goal:	resolution  Instructions for follow-up, activity and diet:	You were found to have some damage to your kidneys during current hospitalization. This is typically a transient condition and your kidney function has been steadily improving. A number of conditions could have contributed to this - dehydration and sepsis are some of them.  You likely had this due to dehydration, as your kidney function improved with fluids. Please continue to drink plenty of water and eat well, as this can help prevent future episodes of kidney problems. If you have any difficulty urinating or pain with urination, please call your regular doctor, the doctor at [facility], or return to the hospital.  Secondary Diagnosis:	Normocytic anemia  Goal:	resolution  Instructions for follow-up, activity and diet:	You were found to have low levels of iron and also low hemoglobin and red blood cells. This was likely caused by not eating enough, but there could also be other reason for you to be anemic. Please follow up with your regular doctor to determine if additional tests are required and what type of treatment will address this condition best.  Secondary Diagnosis:	Hypercalcemia  Instructions for follow-up, activity and diet:	You were found to be hypercalcemic (high levels of calcium in your blood) which might have been caused by dehydration and or some of the medications you have been taking. We ordered more tests and are waiting for some of the results. Please follow up with your outpatient provider to discuss the results of the remaining studies and to discuss any further workup you may need in the future.  Secondary Diagnosis:	Severe protein-calorie malnutrition  Goal:	normal levels of calcium  Instructions for follow-up, activity and diet:	You were found to be underweight. This was caused by not eating because of difficulty swallowing. You were seen by a nutritionist and your diet was adjusted. Please follow up with your gastroenterologist and primary care doctor.  Secondary Diagnosis:	Lytic lesion of bone on x-ray  Goal:	weight gain, BMI > 18  Instructions for follow-up, activity and diet:	You had a chest x-ray in the hospital which showed a lesions on one of your ribs on the right side, as we discussed before you left the hospital. A number of processes could cause this - you will need to follow up with your outpatient provider to discuss your options regarding further diagnostic steps and available treatment.  While we do not know that this is cancer, cancer is a potential cause of lesions like this, and it may require additional testing and follow up. Please talk to your regular doctor about these lesions, especially if you desire further workup and/or treatment.

## 2017-11-16 NOTE — CONSULT NOTE ADULT - ASSESSMENT
89F hx of HTN, HLD, Breast CA, GERD, Anxiety/Insomnia brought in by [facility] for cough, generalized weakness, confusion, and decreased PO intake for about a week found to be septic + new R lower Problem/Plan - 4:  ·  Problem: Hypercalcemia.  Plan: Ca elevated to 12.3, was 8.5 in May 2017. Unclear etiology, pt not on thiazides. Pt w/history of Possible malignancy?. Pt not complaining of back pain, renal fxn at baseline.   -f/u PTH  - trend BMP  - c/w IVF hydration. Ca elevated to 12.3, was 8.5 in May 2017. Unclear etiology, pt not on thiazides. Pt w/history of Possible malignancy?. Pt not complaining of back pain, renal fxn at baseline.   -f/u PTH  - obtain PTH  - trend BMP  - c/w IVF hydration lobe infiltrate    Problem/Plan - 1:  ·  Problem: Sepsis.  Plan: Pt met sepsis criteria with WBC 14 and . likely  2/2 URI vs. CAP. Pt with viral symptoms, however, RVP negative. CXR negative for infiltrates, however, pt with right basilar crackles on exam. s/p 2L NS in ED  - Lactate wnl, however pt still very dry on exam, will continue with maintenance IVF  -  CAP - will start po Levofloxacin 750 q48 hrs today (dc'ed IV Ceftriaxone and azithromycin)  - will repeat CXR in AM   - f/u sputum culture and urine legionella Ag  - f/u BCx. Pt met sepsis criteria with WBC 14 and . likely  2/2 URI vs. CAP. Pt with viral symptoms, however, RVP negative. CXR negative for infiltrates, however, pt with right basilar crackles on exam. s/p 2L NS in ED  - Lactate wnl, however pt still very dry on exam, will continue with maintenance IVF  -  will start po Levofloxacin 75 q48 hrs today (currently on Ceftriaxone and azithromycin for treatment of CAP)  - will repeat CXR in AM s/p fluid resuscitation  - obtain sputum culture and urine legionella Ag  - f/u BCx     Problem/Plan - 2:  ·  Problem: R/O Pneumonia of right lower lobe due to infectious organism.  Plan: see above.     Problem/Plan - 3:  ·  Problem: Weakness.  Plan: Pt with generalized weakness likely 2/2 sepsis. No focal deficits on exam, pt mentating better

## 2017-11-16 NOTE — PROGRESS NOTE ADULT - PROBLEM SELECTOR PLAN 10
Pt underweight, BMI 16.8  - Mechanical soft diet, comfort feeds. Pt likely aspirating - awaiting MBS read  - Nutrition consult - Ensure Enlive® chocolate only BID    PT with dysphagia and weight loss 2/2 poor po intake  Likely going home w/home PT  HSQ    DNR/DNI

## 2017-11-16 NOTE — SWALLOW VFSS/MBS ASSESSMENT ADULT - PHARYNGEAL PHASE COMMENTS
Delayed swallow trigger (worst w thin liq as swallow was triggered after bolus started spilling to pyriforms at times). Transient pen w thin liquids noted. Trace pen before the swallow w NTL but cleared. No alley aspiration across consistencies. Decreased BOT retraction and pharyngeal contractions resulted in post-deglutitive residue, primarily at level of valleculae and pyriforms, but residue was mostly cleared w self-initiated secondary swallow. Bridging osteophyte noted at C3-4, but did not impact overall function of swallow. C-P bar noted as well w intermittent residual above the UES, but this was cleared w secondary swallow and did not compromise the overall function of the swallow. Delayed swallow trigger (worst w thin liq as swallow was triggered after bolus started spilling to pyriforms at times). Transient pen w thin liquids noted. Trace pen before the swallow w NTL but cleared. No alley aspiration across consistencies. Decreased BOT retraction and pharyngeal contractions resulted in post-deglutitive residue, primarily at level of valleculae and pyriforms, but residue was mostly cleared w self-initiated/cued secondary swallow. Bridging osteophyte noted at C3-4, but did not impact overall function of swallow. C-P bar noted as well w intermittent residual above the UES, but this was cleared w secondary swallow and did not compromise the overall function of the swallow.

## 2017-11-16 NOTE — DISCHARGE NOTE ADULT - HOSPITAL COURSE
89F PMH of HTN, HLD, AAA, Breast CA, GERD, Anxiety/Insomnia brought in by [facility] for cough, generalized weakness, confusion, and dehydration 2/2 decreased PO intake for about a week found to be septic + new R lower lobe mass-like consolidation and/or left pleural effusion; also expansile lytic lesion in 6th rib on CXR suggestive of metastasis. During this admission, pt received IVF, 1 dose of IV Levaquin and her home meds as well and her MS has gradually improved.   She was also found to be anemic, hypercalcemic with decreased PTH. Further workup studies are pending.   Pt passed modified barium swallow. 89F PMH of HTN, HLD, AAA, Breast CA, GERD, Anxiety/Insomnia brought in by [facility]HA for cough, generalized weakness, confusion, and dehydration 2/2 decreased PO intake for about a week. Patient initially met SIRS criteria, an CXR showed obscuration of L hemidiaphragm and costophrenic angle with an expansile lytic lesion on right lateral sixth rib, so she was started on  ceftriaxone and azithromycin, which was later changed to Levaquin, then discontinued as patient did not have any additional symptoms of pneumonia or active infection. She was noted to be significantly dehydrated with hypercalcemia to 12.3 on admission with Albumin of 2.7. She was started on IVF, with improvement of serum calcium to 10.4, with albumin 2.0 on day of discharge, without any symptoms of hypercalcemia. Serum PTH was found to be low, PTHrP still pending at time of discharge. She had a repeat CXR on 11/16, day prior to discharge, which showed a persistent obscuration of the left hemidiaphragm and costophrenic angle, and the expansile lytic lesion involving the right lateral sixth rib, which was seen on the prior CXR, concerning for an aggressive lesion, possibly a metastatic focus.  Her confusion, which had been present on admission, was mostly resolved at time of discharge. Lytic lesion seen on CXR was discussed with patient and family, as well as need for outpatient follow up and possible workup of lesions, which may represent metastatic lesion. She was seen by physical therapy who recommended discharge to [facility], patient to be discharged to [facility]. She was also seen by speech and swallow, and had a modified barium swallow, which did not show evidence of any aspiration. She is currently stable for discharge to [facility], with instruction to follow with her PMD for further workup of CXR findings and continued encouragement of PO fluid intake to prevent future episodes of dehydration.

## 2017-11-16 NOTE — SWALLOW VFSS/MBS ASSESSMENT ADULT - ADDITIONAL RECOMMENDATIONS
1) Given pt's advanced age- it is recommended that the pt's long-term feeding goals be clarified and documented.  2) This SVC will f/u

## 2017-11-16 NOTE — PROGRESS NOTE ADULT - ASSESSMENT
89F hx of HTN, HLD, Breast CA, GERD, Anxiety/Insomnia brought in by [facility] for cough, generalized weakness, confusion, and decreased PO intake for about a week found to be septic + new R lower lobe infiltrate 89F hx of HTN, HLD, Breast CA, GERD, Anxiety/Insomnia brought in by [facility]HA for cough, generalized weakness, confusion, and decreased PO intake for about a week found to be septic + new R lower lobe infiltrate. Pt passed modified barium swallow. CXR 11/16 showing lytic lesion suggesting possible met.

## 2017-11-16 NOTE — DISCHARGE NOTE ADULT - OTHER SIGNIFICANT FINDINGS
Xray Chest 1 View AP -PORTABLE-Routine (11.16.17 @ 06:46)   IMPRESSION:   Persistent obscuration of the left hemidiaphragm and costophrenic angle,   as described on the prior chest radiograph.  Expansile lytic lesion involving the right lateral sixth rib. Findings   concerning for an aggressive lesion, possibly a metastatic focus.  Further evaluation with PET/CT scan and/or repeat CT chest with IV   contrast is recommended.    CT Head No Cont (11.14.17 @ 21:19)   IMPRESSION: No acute intracranial findings. Few small foci of air in the   region of the left cavernous sinus. Please correlate with any history of   recent IV injection. Xray Chest 1 View AP -PORTABLE-Routine (11.16.17 @ 06:46)   IMPRESSION:   Persistent obscuration of the left hemidiaphragm and costophrenic angle,   as described on the prior chest radiograph.  Expansile lytic lesion involving the right lateral sixth rib. Findings   concerning for an aggressive lesion, possibly a metastatic focus.  Further evaluation with PET/CT scan and/or repeat CT chest with IV   contrast is recommended.    CT Head No Cont (11.14.17 @ 21:19)   IMPRESSION: No acute intracranial findings. Few small foci of air in the   region of the left cavernous sinus.

## 2017-11-16 NOTE — CONSULT NOTE ADULT - SUBJECTIVE AND OBJECTIVE BOX
Patient is a 89y old  Female who presents with a chief complaint of decreased PO intake and generalized weakness (2017 23:50)       HPI:  89F hx of HTN, HLD, Breast CA, GERD, Anxiety/Insomnia brought in by [facility] for cough, generalized weakness and decreased PO intake for 3-4 days. Since Friday, pt has developed productive cough with whitish sputum, associated with runny nose and watery eyes. Pt also has decreased appetite and general weakness noted by HHA. Pt denies any fever, chills, chest pain, SOB, n/v/d/c, dysuria. No recent hospitalization or Abx use, no sick contacts or recent travels. (2017 23:50)      PAST MEDICAL & SURGICAL HISTORY:  Breast cancer in female  Hyperlipidemia  Anxiety  HTN (hypertension)  GERD (gastroesophageal reflux disease)  H/O:   H/O lumpectomy      MEDICATIONS  (STANDING):  cyanocobalamin 500 MICROGram(s) Oral daily  famotidine    Tablet 20 milliGRAM(s) Oral daily  heparin  Injectable 5000 Unit(s) SubCutaneous every 12 hours  levoFLOXacin  Tablet 750 milliGRAM(s) Oral every 48 hours  levothyroxine 75 MICROGram(s) Oral daily  magnesium sulfate  IVPB 1 Gram(s) IV Intermittent once  potassium chloride  10 mEq/100 mL IVPB 10 milliEquivalent(s) IV Intermittent every 1 hour  sodium chloride 0.9%. 1000 milliLiter(s) (70 mL/Hr) IV Continuous <Continuous>    MEDICATIONS  (PRN):      Social History: lives with  in an elevator accessible apartment building, 2 steps to enter lobby, has home attendant 12 hrs x 5 days    Functional Level Prior to Admission: reports partial assist with bathing, walks with a rolling walker    FAMILY HISTORY:  No pertinent family history in first degree relatives      CBC Full  -  ( 2017 06:13 )  WBC Count : 10.0 K/uL  Hemoglobin : 8.4 g/dL  Hematocrit : 26.1 %  Platelet Count - Automated : 257 K/uL  Mean Cell Volume : 91.9 fL  Mean Cell Hemoglobin : 29.6 pg  Mean Cell Hemoglobin Concentration : 32.2 g/dL  Auto Neutrophil # : x  Auto Lymphocyte # : x  Auto Monocyte # : x  Auto Eosinophil # : x  Auto Basophil # : x  Auto Neutrophil % : x  Auto Lymphocyte % : x  Auto Monocyte % : x  Auto Eosinophil % : x  Auto Basophil % : x      11-16    133<L>  |  98  |  24<H>  ----------------------------<  102<H>  3.4<L>   |  23  |  0.94    Ca    10.4      2017 06:15  Mg     1.6         TPro  7.0  /  Alb  2.3<L>  /  TBili  0.3  /  DBili  x   /  AST  20  /  ALT  20  /  AlkPhos  64        Urinalysis Basic - ( 2017 19:55 )    Color: Yellow / Appearance: Clear / S.010 / pH: x  Gluc: x / Ketone: NEGATIVE  / Bili: Negative / Urobili: 0.2 E.U./dL   Blood: x / Protein: Trace mg/dL / Nitrite: POSITIVE   Leuk Esterase: NEGATIVE / RBC: < 5 /HPF / WBC < 5 /HPF   Sq Epi: x / Non Sq Epi: 0-5 /HPF / Bacteria: Many /HPF          Radiology:    < from: CT Head No Cont (. @ 21:19) >  EXAM:  CT BRAIN                          PROCEDURE DATE:  2017                     INTERPRETATION:  PROCEDURE: CT head without intravenous contrast    INDICATION: Altered mental status. Status post fall 2 months ago.    TECHNIQUE: Multiple axial images were obtained at 5 mm intervals from the   skull base to the vertex. The images were reviewed in brain and bone   windows.    COMPARISON: Prior study dated 2017.    FINDINGS: The CT examination demonstrates the ventricles, cisternal   spaces, and cortical sulci to be within normal limits for patient's given   age. Extensive patchy bilateral periventricular white matter   hypodensities consistent with microvascular ischemic white matter   disease. Small hypodensities are noted in bilateral basal ganglia and   bilateral thalami which may represent old infarctions. There is no   midline shift or extra axial collections. The gray white differentiation   appears within normal limits. There is no intracranial hemorrhage or   acute transcortical infarct. The bony windows demonstrates no fractures.   The visualized paranasal sinuses are within normal limits. The mastoid   air cells are well aerated. There is evidence of prior bilateral cataract   surgery. Few small foci of air are seen in the region of the left   cavernous sinus. Please correlate with any history of recent IV injection.    IMPRESSION: No acute intracranial findings. Few small foci of air in the   region of the left cavernous sinus. Please correlate with any history of   recent IV injection.      < end of copied text >          Vital Signs Last 24 Hrs  T(C): 36.9 (2017 08:49), Max: 36.9 (2017 08:49)  T(F): 98.5 (2017 08:49), Max: 98.5 (2017 08:49)  HR: 106 (2017 08:49) (99 - 106)  BP: 157/90 (2017 08:49) (134/79 - 157/90)  BP(mean): --  RR: 17 (2017 08:49) (17 - 19)  SpO2: 96% (2017 08:49) (95% - 97%)    REVIEW OF SYSTEMS:    CONSTITUTIONAL:  fatigue  EYES: No eye pain, visual disturbances, or discharge  ENMT:  No difficulty hearing, tinnitus, vertigo; No sinus or throat pain  NECK: No pain or stiffness  BREASTS: No pain, masses, or nipple discharge  RESPIRATORY: No cough, wheezing, chills or hemoptysis; No shortness of breath  CARDIOVASCULAR: No chest pain, palpitations, dizziness, or leg swelling  GASTROINTESTINAL: No abdominal or epigastric pain. No nausea, vomiting, or hematemesis; No diarrhea or constipation. No melena or hematochezia.  GENITOURINARY: No dysuria, frequency, hematuria, or incontinence  NEUROLOGICAL: No headaches, memory loss, loss of strength, numbness, or tremors  SKIN: No itching, burning, rashes, or lesions   LYMPH NODES: No enlarged glands  ENDOCRINE: No heat or cold intolerance; No hair loss  MUSCULOSKELETAL: No joint pain or swelling; No muscle, back, or extremity pain  PSYCHIATRIC: No depression, anxiety, mood swings, or difficulty sleeping  HEME/LYMPH: No easy bruising, or bleeding gums  ALLERGY AND IMMUNOLOGIC: No hives or eczema      Physical Exam: frail/cachectic appearing  woman lying in bed, c/o fatigue    HEENT: normocephalic,  anicteric,  atraumatic    Neck: supple, negative JVD, negative carotid bruits,    Chest: crackles at right base    Cardiovascular: regular rate and rhythm, neg murmurs/rubs/gallops    Abdomen: soft, non distended, non tender, negative rebound/guarding, normal bowel sounds, neg hepatosplenomegaly    Extremities: 1 + LE edema, negative calf tenderness to palpation, negative Kristen's sign    :     Neurologic Exam:    Alert and oriented to person, place, date/year, speech fluent w/o dysarthria, repetition intact, comprehension intact,     Cranial Nerves:     II:                       pupils equal, round and reactive to light, visual fields intact   III/ IV/VI:             extraocular movements intact, neg nystagmus, ptosis  V:                      facial sensation intact, V1-3 normal  VII:                     face symmetric, no droop, normal eye closure and smile  VIII:                    hearing intact to finger rub bilaterally  IX/ X:                  soft palate rise symmetrical  XI:                      head turning, shoulder shrug normal  XII:                     tongue midline    Motor Exam:    Bilateral UE:         4/5 /intrinsics                            5/5 biceps/triceps/wrist extensors-flexors/deltoid                            negative pronator drift      Bilateral LE:         4-/5 hip flexors/adductors/abductors                            4+/5 quadriceps/hamstrings                            5/5 dorsiflexors/plantar flexors/invertors-evertors    Sensory: intact to LT/PP in all UE/LE dermatomes    DTR:        = biceps/     triceps/     brachioradialis                 = patella/   medial hamstring/    ankle                 neg clonus                 neg Babinski                 neg Hoffmans    Finger to Nose: mild Right dysmetria    Heel to Shin:      wnl    Rapid Alternating movements:  wnl    Joint Position Sense:  intact    Romberg: NT    Tandem Walking: NT    Gait:  NT        PM&R Impression:    1) deconditioned  2) gait dysfunction: multi factorial      Recommendations:    1) Physical therapy focusing on therapeutic exercises, bed mobility/transfer out of bed evaluation, progressive ambulation with assistive devices.    2) Disposition Plan: recommend subacute rehab placement

## 2017-11-16 NOTE — DISCHARGE NOTE ADULT - PATIENT PORTAL LINK FT
“You can access the [employer] Patient Portal, offered by [facility], by registering with the following website: [email or web address]”

## 2017-11-16 NOTE — PROGRESS NOTE ADULT - PROBLEM SELECTOR PLAN 5
Pt with significant weight loss and dysphagia + poor po intake, h/o  BR cr and now with hypercalcemia and decreased PTH  -  gamma gap workup pending Pt with significant weight loss and dysphagia + poor po intake, h/o  BR cr and now with hypercalcemia and decreased PTH  Passed modified barium swallow today  -  gamma gap workup pending

## 2017-11-16 NOTE — DISCHARGE NOTE ADULT - PLAN OF CARE
resolution, no cough You came to the hospital and were found to have pneumonia. You received a course of antibiotics consisting of taking Levofloxacin every other day (your last dose will be on the 19th of November to complete a total of 3 doses/6days). Your pneumonia has responded appropriately to antibioticotherapy. resolution You were found to be septic (it occurs when chemicals released into the bloodstream to fight the infection trigger inflammatory responses throughout the body). This state has responded well to the antibiotics and you are no longer septic. Please continue taking antibiotics as prescribed and follow up with your outpatient provider. normal levels of calcium You were found to be hypercalcemic (high levels of calcium in your blood) which might have been caused by dehydration and or some of the medications you have been taking. We ordered more tests and are waiting for some of the results. Please follow up with your outpatient provider to discuss the results of the remaining studies. You have a long standing diagnosis of hypertension and have been taking medications to control your blood pressure. We held those medications while you were septic. Please continue taking the medications and follow up with your outpatient provider. You have an existing diagnosis of hyperlipidemia. Please continue taking simvastatin and follow up with your outpatient provider. no GERD Please continue taking Nexium and follow up with your outpatient provider. You came to the hospital because you became confused. A number of processes could cause this - hypercalcemia, dehydration, infection are some of them.   You received antibiotics, intravenous hydration, as well as your home medications and your confusion has been gradually improving.    You were also found to have a lesion in one of your ribs on a chest x-ray. A number of processes could cause this - you will need to follow up with your outpatient provider to discuss your options regarding further diagnostic steps and available treatment. You were found to be septic likely due to pneumonia (it occurs when chemicals released into the bloodstream to fight the infection trigger inflammatory responses throughout the body). This state has responded well to intravenous hydration and the antibiotics and you are no longer septic. Please follow up with your outpatient provider. well controlled blood pressure no anxiety, no insomnia You have an existing diagnosis of anxiety and insomnia. Please continue taking your medications and follow up with your PCP and/or psychiatrist. You were found to have some damage to your kidneys during current hospitalization. This is typically a transient condition and your kidney function has been steadily improving. A number of conditions could have contributed to this - dehydration and sepsis are some of them. You were found to have low levels of iron and also low hemoglobin and red blood cells. This was likely caused by not eating enough, but there could also be other reason for you to be anemic. Please follow up with your PCP to determine if additional tests are required and what type of treatment will address this condition best. You were found to have bacteria in the urine but have no symptoms. This did not need to be treated and you did not receive any antibiotics. weight gain, BMI > 18 You were found to be underweight. This was caused by not eating because of difficulty swallowing. You were seen by a nutritionist and your diet was adjusted. Please follow up with your gastroenterologist and primary care doctor. You came to the hospital because you became confused. A number of processes could cause this - hypercalcemia, dehydration, infection are some of them. You received antibiotics, intravenous hydration, as well as your home medications and your confusion has been gradually improving.  You had a CT scan of your head in the hospital which did not show any stroke or bleed, but did show some age-related disease.  Please continue to drink plenty of water and eat well to help prevent future episodes. If you develop any repeat episodes of confusion, like the one you came with, please call your regular doctor, the doctor at [facility], or return to the hospital. You were found to be septic likely due to pneumonia (it occurs when chemicals released into the bloodstream to fight the infection trigger inflammatory responses throughout the body). This state has responded well to intravenous hydration and the antibiotics and you are no longer septic. Please follow up with your outpatient provider. If you develop any new fevers, chills, fast heart beat, new cough, or other symptoms, please see your regular doctor, the doctor at [facility], or return to the hospital. You were found to have some damage to your kidneys during current hospitalization. This is typically a transient condition and your kidney function has been steadily improving. A number of conditions could have contributed to this - dehydration and sepsis are some of them.  You likely had this due to dehydration, as your kidney function improved with fluids. Please continue to drink plenty of water and eat well, as this can help prevent future episodes of kidney problems. If you have any difficulty urinating or pain with urination, please call your regular doctor, the doctor at [facility], or return to the hospital. You were found to have low levels of iron and also low hemoglobin and red blood cells. This was likely caused by not eating enough, but there could also be other reason for you to be anemic. Please follow up with your regular doctor to determine if additional tests are required and what type of treatment will address this condition best. You were found to be hypercalcemic (high levels of calcium in your blood) which might have been caused by dehydration and or some of the medications you have been taking. We ordered more tests and are waiting for some of the results. Please follow up with your outpatient provider to discuss the results of the remaining studies and to discuss any further workup you may need in the future. You had a chest x-ray in the hospital which showed a lesions on one of your ribs on the right side, as we discussed before you left the hospital. A number of processes could cause this - you will need to follow up with your outpatient provider to discuss your options regarding further diagnostic steps and available treatment.  While we do not know that this is cancer, cancer is a potential cause of lesions like this, and it may require additional testing and follow up. Please talk to your regular doctor about these lesions, especially if you desire further workup and/or treatment.

## 2017-11-17 VITALS
DIASTOLIC BLOOD PRESSURE: 74 MMHG | OXYGEN SATURATION: 98 % | SYSTOLIC BLOOD PRESSURE: 166 MMHG | TEMPERATURE: 98 F | RESPIRATION RATE: 16 BRPM | HEART RATE: 74 BPM

## 2017-11-17 DIAGNOSIS — N17.9 ACUTE KIDNEY FAILURE, UNSPECIFIED: ICD-10-CM

## 2017-11-17 DIAGNOSIS — E43 UNSPECIFIED SEVERE PROTEIN-CALORIE MALNUTRITION: ICD-10-CM

## 2017-11-17 DIAGNOSIS — E86.0 DEHYDRATION: ICD-10-CM

## 2017-11-17 DIAGNOSIS — G93.41 METABOLIC ENCEPHALOPATHY: ICD-10-CM

## 2017-11-17 DIAGNOSIS — A41.9 SEPSIS, UNSPECIFIED ORGANISM: ICD-10-CM

## 2017-11-17 DIAGNOSIS — M89.50 OSTEOLYSIS, UNSPECIFIED SITE: ICD-10-CM

## 2017-11-17 DIAGNOSIS — J18.9 PNEUMONIA, UNSPECIFIED ORGANISM: ICD-10-CM

## 2017-11-17 LAB
% ALBUMIN: 31.2 % — SIGNIFICANT CHANGE UP
% ALPHA 1: 7.8 % — SIGNIFICANT CHANGE UP
% ALPHA 2: 14.6 % — SIGNIFICANT CHANGE UP
% BETA: 12.8 % — SIGNIFICANT CHANGE UP
% GAMMA: 33.6 % — SIGNIFICANT CHANGE UP
ALBUMIN SERPL ELPH-MCNC: 2 G/DL — LOW (ref 3.3–5)
ALBUMIN SERPL ELPH-MCNC: 2 G/DL — LOW (ref 3.6–5.5)
ALBUMIN/GLOB SERPL ELPH: 0.5 RATIO — SIGNIFICANT CHANGE UP
ALP SERPL-CCNC: 65 U/L — SIGNIFICANT CHANGE UP (ref 40–120)
ALPHA1 GLOB SERPL ELPH-MCNC: 0.5 G/DL — HIGH (ref 0.1–0.4)
ALPHA2 GLOB SERPL ELPH-MCNC: 0.9 G/DL — SIGNIFICANT CHANGE UP (ref 0.5–1)
ALT FLD-CCNC: 17 U/L — SIGNIFICANT CHANGE UP (ref 10–45)
ANION GAP SERPL CALC-SCNC: 10 MMOL/L — SIGNIFICANT CHANGE UP (ref 5–17)
AST SERPL-CCNC: 16 U/L — SIGNIFICANT CHANGE UP (ref 10–40)
B-GLOBULIN SERPL ELPH-MCNC: 0.8 G/DL — SIGNIFICANT CHANGE UP (ref 0.5–1)
BILIRUB SERPL-MCNC: 0.3 MG/DL — SIGNIFICANT CHANGE UP (ref 0.2–1.2)
BUN SERPL-MCNC: 17 MG/DL — SIGNIFICANT CHANGE UP (ref 7–23)
CALCIUM SERPL-MCNC: 10.4 MG/DL — SIGNIFICANT CHANGE UP (ref 8.4–10.5)
CALCIUM SERPL-MCNC: 11.5 MG/DL — HIGH (ref 8.4–10.5)
CHLORIDE SERPL-SCNC: 97 MMOL/L — SIGNIFICANT CHANGE UP (ref 96–108)
CO2 SERPL-SCNC: 26 MMOL/L — SIGNIFICANT CHANGE UP (ref 22–31)
CREAT SERPL-MCNC: 0.92 MG/DL — SIGNIFICANT CHANGE UP (ref 0.5–1.3)
GAMMA GLOBULIN: 2.2 G/DL — HIGH (ref 0.6–1.6)
GLUCOSE SERPL-MCNC: 114 MG/DL — HIGH (ref 70–99)
HCT VFR BLD CALC: 26.7 % — LOW (ref 34.5–45)
HGB BLD-MCNC: 8.5 G/DL — LOW (ref 11.5–15.5)
INTERPRETATION SERPL IFE-IMP: SIGNIFICANT CHANGE UP
MAGNESIUM SERPL-MCNC: 2 MG/DL — SIGNIFICANT CHANGE UP (ref 1.6–2.6)
MCHC RBC-ENTMCNC: 28.9 PG — SIGNIFICANT CHANGE UP (ref 27–34)
MCHC RBC-ENTMCNC: 31.8 G/DL — LOW (ref 32–36)
MCV RBC AUTO: 90.8 FL — SIGNIFICANT CHANGE UP (ref 80–100)
PLATELET # BLD AUTO: 271 K/UL — SIGNIFICANT CHANGE UP (ref 150–400)
POTASSIUM SERPL-MCNC: 3.6 MMOL/L — SIGNIFICANT CHANGE UP (ref 3.5–5.3)
POTASSIUM SERPL-SCNC: 3.6 MMOL/L — SIGNIFICANT CHANGE UP (ref 3.5–5.3)
PROT PATTERN SERPL ELPH-IMP: SIGNIFICANT CHANGE UP
PROT SERPL-MCNC: 7 G/DL — SIGNIFICANT CHANGE UP (ref 6–8.3)
PTH-INTACT FLD-MCNC: 2 PG/ML — LOW (ref 15–65)
RBC # BLD: 2.94 M/UL — LOW (ref 3.8–5.2)
RBC # FLD: 14.6 % — SIGNIFICANT CHANGE UP (ref 10.3–16.9)
SODIUM SERPL-SCNC: 133 MMOL/L — LOW (ref 135–145)
WBC # BLD: 12.8 K/UL — HIGH (ref 3.8–10.5)
WBC # FLD AUTO: 12.8 K/UL — HIGH (ref 3.8–10.5)

## 2017-11-17 PROCEDURE — 99239 HOSP IP/OBS DSCHRG MGMT >30: CPT

## 2017-11-17 RX ORDER — RANITIDINE HYDROCHLORIDE 150 MG/1
10 TABLET, FILM COATED ORAL
Qty: 0 | Refills: 0 | COMMUNITY

## 2017-11-17 RX ORDER — DOCUSATE SODIUM 100 MG
1 CAPSULE ORAL
Qty: 0 | Refills: 0 | COMMUNITY

## 2017-11-17 RX ORDER — POTASSIUM CHLORIDE 20 MEQ
40 PACKET (EA) ORAL ONCE
Qty: 0 | Refills: 0 | Status: COMPLETED | OUTPATIENT
Start: 2017-11-17 | End: 2017-11-17

## 2017-11-17 RX ORDER — MAGNESIUM SULFATE 500 MG/ML
1 VIAL (ML) INJECTION ONCE
Qty: 0 | Refills: 0 | Status: DISCONTINUED | OUTPATIENT
Start: 2017-11-17 | End: 2017-11-17

## 2017-11-17 RX ORDER — SODIUM CHLORIDE 5 %
1 DROPS OPHTHALMIC (EYE)
Qty: 0 | Refills: 0 | COMMUNITY

## 2017-11-17 RX ORDER — ZOLPIDEM TARTRATE 10 MG/1
1 TABLET ORAL
Qty: 0 | Refills: 0 | COMMUNITY

## 2017-11-17 RX ORDER — POTASSIUM CHLORIDE 20 MEQ
40 PACKET (EA) ORAL EVERY 4 HOURS
Qty: 0 | Refills: 0 | Status: DISCONTINUED | OUTPATIENT
Start: 2017-11-17 | End: 2017-11-17

## 2017-11-17 RX ORDER — METOPROLOL TARTRATE 50 MG
1 TABLET ORAL
Qty: 0 | Refills: 0 | COMMUNITY
Start: 2017-11-17

## 2017-11-17 RX ORDER — FAMOTIDINE 10 MG/ML
1 INJECTION INTRAVENOUS
Qty: 0 | Refills: 0 | COMMUNITY
Start: 2017-11-17

## 2017-11-17 RX ADMIN — Medication 50 MILLIGRAM(S): at 06:27

## 2017-11-17 RX ADMIN — Medication 40 MILLIEQUIVALENT(S): at 11:20

## 2017-11-17 RX ADMIN — HEPARIN SODIUM 5000 UNIT(S): 5000 INJECTION INTRAVENOUS; SUBCUTANEOUS at 06:27

## 2017-11-17 RX ADMIN — FAMOTIDINE 20 MILLIGRAM(S): 10 INJECTION INTRAVENOUS at 11:23

## 2017-11-17 RX ADMIN — PREGABALIN 500 MICROGRAM(S): 225 CAPSULE ORAL at 11:23

## 2017-11-17 RX ADMIN — Medication 75 MICROGRAM(S): at 06:27

## 2017-11-17 NOTE — PROGRESS NOTE ADULT - SUBJECTIVE AND OBJECTIVE BOX
CC: No complaints currently. Was disoriented last night ("sun-downing").  Constipation +  Rest of ROS negative.     Vital Signs Last 24 Hrs  T(C): 36.5 (17 Nov 2017 08:54), Max: 36.9 (16 Nov 2017 20:57)  T(F): 97.7 (17 Nov 2017 08:54), Max: 98.5 (16 Nov 2017 20:57)  HR: 74 (17 Nov 2017 08:54) (74 - 106)  BP: 166/74 (17 Nov 2017 08:54) (159/91 - 173/92)  BP(mean): --  RR: 16 (17 Nov 2017 08:54) (16 - 21)  SpO2: 98% (17 Nov 2017 08:54) (95% - 98%)    PHYSICAL EXAMINATION  * General: Not in acute distress. Awake and alert. Lying comfortably in bed.  * Head: Normocephalic, atraumatic.  * HEENT: ears no discharge, eyes PERRLA, nose no discharge, throat no exudates, normal tonsils.  * Neck: no JVD, supple.  * Lungs: Clear to auscultation, no rales, no wheezes.  * Cardio: Regular rate and rhythm, no murmurs, no rubs, no gallops. Good peripheral pulses.  * Abdomen: Soft, non-tender, non-distended, tympanic to percussion, no rebound, no guarding, no rigidity. Bowel sounds present. No suprapubic or CVA tenderness.  * : Deferred.  * Extremities: Acyanotic, no edema.  * Skin: Warm and dry.  * Neuro: Alert and oriented x 3. No focal deficits. Motor strength is 5/5 throughout. Sensation intact. Cranial nerves II-XII grossly intact.     MEDICATIONS  (STANDING):  cyanocobalamin 500 MICROGram(s) Oral daily  famotidine    Tablet 20 milliGRAM(s) Oral daily  heparin  Injectable 5000 Unit(s) SubCutaneous every 12 hours  levothyroxine 75 MICROGram(s) Oral daily  metoprolol     tartrate 50 milliGRAM(s) Oral two times a day  sodium chloride 0.9%. 1000 milliLiter(s) (70 mL/Hr) IV Continuous <Continuous>    MEDICATIONS  (PRN):

## 2017-11-17 NOTE — PROGRESS NOTE ADULT - ATTENDING COMMENTS
Medically optimized and ready for DC to [facility] as per PT recs.
Patient was seen and examined by me at bedside. I agree with resident's note, subjective, objective physical exam, assessment and plan with following modifications/additions.     1) Acute Metabolic encephalopathy, likely 2/2 to Severe dehydration and PNA  2) Sepsis 2/2 CAP  3) Prerenal VIR[place]  4) Normocytic anemia  5) Asymptomatic bacteriuria.  6) Hypercalcemia  7) Severe protein calory malnutrition.    Plan: Will c/w Levaquin for total course of 5 days. Speech and Swallow recs appreciated. Will c/w Mechanical soft and thin liquids.   PT recs appreciated. Will have SW consult and Nutrition for home food recs.   Est CO home today
Patient was seen and examined by me at bedside. I agree with resident's note, subjective, objective physical exam, assessment and plan with following modifications/additions.     1) Acute Metabolic encephalopathy, likely 2/2 to Severe dehydration and PNA  2) Sepsis 2/2 CAP  3) Prerenal [place]  4) Normocytic anemia  5) Asymptomatic bacteriuria.  6) Hypercalcemia  7) Severe protein calory malnutrition.    Plan: RML infiltrate. Will c/w CAP tx with Levaquin. Speech and Swallow evaluation and PT eval. V[place] is resolved after fluids. Sepsis resolved. Encephalopathy resolved. Hypercalcemia improved. Spoke with HCP and Daughter, [place].   Agreed with DNR and DNI.   Estimated DC Home on 11/16

## 2017-11-17 NOTE — PROGRESS NOTE ADULT - PROBLEM SELECTOR PLAN 8
on Bisoprolol 5mg at home  - can continue with metoprolol 50mg BID (equivalent dose) - consider restarting today?  #HLD: - verify simvastatin dose and resume home med
As above
on Bisoprolol 5mg at home  - can continue with metoprolol 50mg BID (equivalent dose) - will hold for now in setting of sepsis    #HLD: - verify simvastatin dose and resume home med

## 2017-11-17 NOTE — PROGRESS NOTE ADULT - PROBLEM SELECTOR PLAN 7
- TSH nl  - c/w Synthroid 75mcg daily.
Nutritionist optimized and recommended nutrition.
- check TSH in AM  - c/w Synthroid 75mcg daily.

## 2017-11-17 NOTE — PROGRESS NOTE ADULT - PROBLEM SELECTOR PLAN 5
With low PTH. 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  Give h/o breast cancer.  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  Discussed with patient and family at bedside (son and ) about findings and need for further work-up as outpatient if patient would like to pursue diagnosis.   Malignancy suspicion is high and information was given to patient and family.   They will talk and decide if they want to pursue diagnosis and eventual treatment with PCP.

## 2017-11-17 NOTE — PROGRESS NOTE ADULT - PROBLEM SELECTOR PLAN 3
Resolved
Pt with generalized weakness likely 2/2 sepsis. No focal deficits on exam, pt mentating better  - f/u PT consult  - c/w hydration
Pt with generalized weakness likely 2/2 sepsis and dehydration. No focal deficits on exam, pt with waxing and waning MS, slightly more confused today but AAOx3  - PT - [facility], but pt most likely going home w/home PT  - c/w hydration

## 2017-11-17 NOTE — PROGRESS NOTE ADULT - PROBLEM SELECTOR PROBLEM 2
Severe dehydration
R/O Pneumonia of right lower lobe due to infectious organism
R/O Pneumonia of right lower lobe due to infectious organism

## 2017-11-17 NOTE — PROGRESS NOTE ADULT - PROBLEM SELECTOR PLAN 1
Likely related to severe dehydration and infection.   Currently resolved. AAx3.  Had an episode of sundowning last night.   Low suspicion for ongoing infection.

## 2017-11-20 DIAGNOSIS — I10 ESSENTIAL (PRIMARY) HYPERTENSION: ICD-10-CM

## 2017-11-20 DIAGNOSIS — G93.41 METABOLIC ENCEPHALOPATHY: ICD-10-CM

## 2017-11-20 DIAGNOSIS — Z79.82 LONG TERM (CURRENT) USE OF ASPIRIN: ICD-10-CM

## 2017-11-20 DIAGNOSIS — A41.9 SEPSIS, UNSPECIFIED ORGANISM: ICD-10-CM

## 2017-11-20 DIAGNOSIS — F41.9 ANXIETY DISORDER, UNSPECIFIED: ICD-10-CM

## 2017-11-20 DIAGNOSIS — E03.9 HYPOTHYROIDISM, UNSPECIFIED: ICD-10-CM

## 2017-11-20 DIAGNOSIS — N17.9 ACUTE KIDNEY FAILURE, UNSPECIFIED: ICD-10-CM

## 2017-11-20 DIAGNOSIS — E43 UNSPECIFIED SEVERE PROTEIN-CALORIE MALNUTRITION: ICD-10-CM

## 2017-11-20 DIAGNOSIS — Z66 DO NOT RESUSCITATE: ICD-10-CM

## 2017-11-20 DIAGNOSIS — M89.58 OSTEOLYSIS, OTHER SITE: ICD-10-CM

## 2017-11-20 DIAGNOSIS — D64.9 ANEMIA, UNSPECIFIED: ICD-10-CM

## 2017-11-20 DIAGNOSIS — R64 CACHEXIA: ICD-10-CM

## 2017-11-20 DIAGNOSIS — E86.0 DEHYDRATION: ICD-10-CM

## 2017-11-20 DIAGNOSIS — K21.9 GASTRO-ESOPHAGEAL REFLUX DISEASE WITHOUT ESOPHAGITIS: ICD-10-CM

## 2017-11-20 DIAGNOSIS — G47.00 INSOMNIA, UNSPECIFIED: ICD-10-CM

## 2017-11-20 DIAGNOSIS — E78.5 HYPERLIPIDEMIA, UNSPECIFIED: ICD-10-CM

## 2017-11-20 DIAGNOSIS — Z85.3 PERSONAL HISTORY OF MALIGNANT NEOPLASM OF BREAST: ICD-10-CM

## 2017-11-20 DIAGNOSIS — R53.1 WEAKNESS: ICD-10-CM

## 2017-11-20 DIAGNOSIS — J18.9 PNEUMONIA, UNSPECIFIED ORGANISM: ICD-10-CM

## 2017-11-20 DIAGNOSIS — E83.52 HYPERCALCEMIA: ICD-10-CM

## 2017-11-20 DIAGNOSIS — R82.71 BACTERIURIA: ICD-10-CM

## 2017-11-20 LAB
CULTURE RESULTS: SIGNIFICANT CHANGE UP
CULTURE RESULTS: SIGNIFICANT CHANGE UP
SPECIMEN SOURCE: SIGNIFICANT CHANGE UP
SPECIMEN SOURCE: SIGNIFICANT CHANGE UP

## 2017-11-21 LAB — PTH RELATED PROT SERPL-MCNC: <1.1 PMOL/L — SIGNIFICANT CHANGE UP

## 2017-11-22 ENCOUNTER — INPATIENT (INPATIENT)
Facility: HOSPITAL | Age: 82
LOS: 6 days | Discharge: HOME HEALTH | DRG: 628 | End: 2017-11-29
Attending: INTERNAL MEDICINE | Admitting: INTERNAL MEDICINE
Payer: MEDICARE

## 2017-11-22 VITALS
RESPIRATION RATE: 18 BRPM | WEIGHT: 91.71 LBS | OXYGEN SATURATION: 97 % | HEIGHT: 56 IN | SYSTOLIC BLOOD PRESSURE: 108 MMHG | HEART RATE: 82 BPM | DIASTOLIC BLOOD PRESSURE: 71 MMHG | TEMPERATURE: 98 F

## 2017-11-22 DIAGNOSIS — Z98.89 OTHER SPECIFIED POSTPROCEDURAL STATES: Chronic | ICD-10-CM

## 2017-11-22 DIAGNOSIS — E83.52 HYPERCALCEMIA: ICD-10-CM

## 2017-11-22 DIAGNOSIS — Z98.890 OTHER SPECIFIED POSTPROCEDURAL STATES: Chronic | ICD-10-CM

## 2017-11-22 DIAGNOSIS — K59.00 CONSTIPATION, UNSPECIFIED: ICD-10-CM

## 2017-11-22 DIAGNOSIS — D72.829 ELEVATED WHITE BLOOD CELL COUNT, UNSPECIFIED: ICD-10-CM

## 2017-11-22 DIAGNOSIS — Z29.9 ENCOUNTER FOR PROPHYLACTIC MEASURES, UNSPECIFIED: ICD-10-CM

## 2017-11-22 DIAGNOSIS — I10 ESSENTIAL (PRIMARY) HYPERTENSION: ICD-10-CM

## 2017-11-22 DIAGNOSIS — K21.9 GASTRO-ESOPHAGEAL REFLUX DISEASE WITHOUT ESOPHAGITIS: ICD-10-CM

## 2017-11-22 DIAGNOSIS — R63.8 OTHER SYMPTOMS AND SIGNS CONCERNING FOOD AND FLUID INTAKE: ICD-10-CM

## 2017-11-22 DIAGNOSIS — M89.9 DISORDER OF BONE, UNSPECIFIED: ICD-10-CM

## 2017-11-22 DIAGNOSIS — E03.9 HYPOTHYROIDISM, UNSPECIFIED: ICD-10-CM

## 2017-11-22 DIAGNOSIS — E78.5 HYPERLIPIDEMIA, UNSPECIFIED: ICD-10-CM

## 2017-11-22 LAB
ALBUMIN SERPL ELPH-MCNC: 2.8 G/DL — LOW (ref 3.3–5)
ALP SERPL-CCNC: 83 U/L — SIGNIFICANT CHANGE UP (ref 40–120)
ALT FLD-CCNC: 32 U/L — SIGNIFICANT CHANGE UP (ref 10–45)
ANION GAP SERPL CALC-SCNC: 11 MMOL/L — SIGNIFICANT CHANGE UP (ref 5–17)
APTT BLD: 30.1 SEC — SIGNIFICANT CHANGE UP (ref 27.5–37.4)
AST SERPL-CCNC: 28 U/L — SIGNIFICANT CHANGE UP (ref 10–40)
BASOPHILS NFR BLD AUTO: 0.2 % — SIGNIFICANT CHANGE UP (ref 0–2)
BILIRUB SERPL-MCNC: 0.2 MG/DL — SIGNIFICANT CHANGE UP (ref 0.2–1.2)
BUN SERPL-MCNC: 36 MG/DL — HIGH (ref 7–23)
CALCIUM SERPL-MCNC: 12.7 MG/DL — HIGH (ref 8.4–10.5)
CHLORIDE SERPL-SCNC: 96 MMOL/L — SIGNIFICANT CHANGE UP (ref 96–108)
CO2 SERPL-SCNC: 28 MMOL/L — SIGNIFICANT CHANGE UP (ref 22–31)
CREAT SERPL-MCNC: 1.2 MG/DL — SIGNIFICANT CHANGE UP (ref 0.5–1.3)
EOSINOPHIL NFR BLD AUTO: 0.5 % — SIGNIFICANT CHANGE UP (ref 0–6)
GLUCOSE SERPL-MCNC: 108 MG/DL — HIGH (ref 70–99)
HCT VFR BLD CALC: 30.1 % — LOW (ref 34.5–45)
HGB BLD-MCNC: 9.4 G/DL — LOW (ref 11.5–15.5)
INR BLD: 1.03 — SIGNIFICANT CHANGE UP (ref 0.88–1.16)
LYMPHOCYTES # BLD AUTO: 9 % — LOW (ref 13–44)
MAGNESIUM SERPL-MCNC: 2.3 MG/DL — SIGNIFICANT CHANGE UP (ref 1.6–2.6)
MCHC RBC-ENTMCNC: 29.2 PG — SIGNIFICANT CHANGE UP (ref 27–34)
MCHC RBC-ENTMCNC: 31.2 G/DL — LOW (ref 32–36)
MCV RBC AUTO: 93.5 FL — SIGNIFICANT CHANGE UP (ref 80–100)
MONOCYTES NFR BLD AUTO: 7.9 % — SIGNIFICANT CHANGE UP (ref 2–14)
NEUTROPHILS NFR BLD AUTO: 82.4 % — HIGH (ref 43–77)
PHOSPHATE SERPL-MCNC: 3 MG/DL — SIGNIFICANT CHANGE UP (ref 2.5–4.5)
PLATELET # BLD AUTO: 310 K/UL — SIGNIFICANT CHANGE UP (ref 150–400)
POTASSIUM SERPL-MCNC: 4.5 MMOL/L — SIGNIFICANT CHANGE UP (ref 3.5–5.3)
POTASSIUM SERPL-SCNC: 4.5 MMOL/L — SIGNIFICANT CHANGE UP (ref 3.5–5.3)
PROT SERPL-MCNC: 8.2 G/DL — SIGNIFICANT CHANGE UP (ref 6–8.3)
PROTHROM AB SERPL-ACNC: 11.4 SEC — SIGNIFICANT CHANGE UP (ref 9.8–12.7)
RBC # BLD: 3.22 M/UL — LOW (ref 3.8–5.2)
RBC # FLD: 14.7 % — SIGNIFICANT CHANGE UP (ref 10.3–16.9)
SODIUM SERPL-SCNC: 135 MMOL/L — SIGNIFICANT CHANGE UP (ref 135–145)
TSH SERPL-MCNC: 4.21 UIU/ML — SIGNIFICANT CHANGE UP (ref 0.35–4.94)
WBC # BLD: 12.5 K/UL — HIGH (ref 3.8–10.5)
WBC # FLD AUTO: 12.5 K/UL — HIGH (ref 3.8–10.5)

## 2017-11-22 PROCEDURE — 99223 1ST HOSP IP/OBS HIGH 75: CPT | Mod: AI,GC

## 2017-11-22 PROCEDURE — 71010: CPT | Mod: 26

## 2017-11-22 RX ORDER — SIMVASTATIN 20 MG/1
10 TABLET, FILM COATED ORAL AT BEDTIME
Qty: 0 | Refills: 0 | Status: DISCONTINUED | OUTPATIENT
Start: 2017-11-22 | End: 2017-11-29

## 2017-11-22 RX ORDER — PAMIDRONATE DISODIUM 9 MG/ML
60 INJECTION, SOLUTION INTRAVENOUS ONCE
Qty: 0 | Refills: 0 | Status: COMPLETED | OUTPATIENT
Start: 2017-11-22 | End: 2017-11-22

## 2017-11-22 RX ORDER — SODIUM CHLORIDE 5 %
1 DROPS OPHTHALMIC (EYE) THREE TIMES A DAY
Qty: 0 | Refills: 0 | Status: DISCONTINUED | OUTPATIENT
Start: 2017-11-22 | End: 2017-11-29

## 2017-11-22 RX ORDER — HEPARIN SODIUM 5000 [USP'U]/ML
5000 INJECTION INTRAVENOUS; SUBCUTANEOUS EVERY 12 HOURS
Qty: 0 | Refills: 0 | Status: DISCONTINUED | OUTPATIENT
Start: 2017-11-22 | End: 2017-11-29

## 2017-11-22 RX ORDER — FAMOTIDINE 10 MG/ML
20 INJECTION INTRAVENOUS DAILY
Qty: 0 | Refills: 0 | Status: DISCONTINUED | OUTPATIENT
Start: 2017-11-22 | End: 2017-11-29

## 2017-11-22 RX ORDER — PREGABALIN 225 MG/1
500 CAPSULE ORAL DAILY
Qty: 0 | Refills: 0 | Status: DISCONTINUED | OUTPATIENT
Start: 2017-11-22 | End: 2017-11-29

## 2017-11-22 RX ORDER — LEVOTHYROXINE SODIUM 125 MCG
75 TABLET ORAL DAILY
Qty: 0 | Refills: 0 | Status: DISCONTINUED | OUTPATIENT
Start: 2017-11-22 | End: 2017-11-29

## 2017-11-22 RX ORDER — DOCUSATE SODIUM 100 MG
100 CAPSULE ORAL DAILY
Qty: 0 | Refills: 0 | Status: DISCONTINUED | OUTPATIENT
Start: 2017-11-22 | End: 2017-11-22

## 2017-11-22 RX ORDER — METOPROLOL TARTRATE 50 MG
50 TABLET ORAL
Qty: 0 | Refills: 0 | Status: DISCONTINUED | OUTPATIENT
Start: 2017-11-22 | End: 2017-11-29

## 2017-11-22 RX ORDER — INFLUENZA VIRUS VACCINE 15; 15; 15; 15 UG/.5ML; UG/.5ML; UG/.5ML; UG/.5ML
0.5 SUSPENSION INTRAMUSCULAR ONCE
Qty: 0 | Refills: 0 | Status: COMPLETED | OUTPATIENT
Start: 2017-11-22 | End: 2017-11-22

## 2017-11-22 RX ORDER — ACETAMINOPHEN 500 MG
650 TABLET ORAL EVERY 6 HOURS
Qty: 0 | Refills: 0 | Status: DISCONTINUED | OUTPATIENT
Start: 2017-11-22 | End: 2017-11-29

## 2017-11-22 RX ORDER — POLYETHYLENE GLYCOL 3350 17 G/17G
17 POWDER, FOR SOLUTION ORAL DAILY
Qty: 0 | Refills: 0 | Status: DISCONTINUED | OUTPATIENT
Start: 2017-11-22 | End: 2017-11-24

## 2017-11-22 RX ORDER — SODIUM CHLORIDE 9 MG/ML
1000 INJECTION INTRAMUSCULAR; INTRAVENOUS; SUBCUTANEOUS
Qty: 0 | Refills: 0 | Status: DISCONTINUED | OUTPATIENT
Start: 2017-11-22 | End: 2017-11-23

## 2017-11-22 RX ADMIN — HEPARIN SODIUM 5000 UNIT(S): 5000 INJECTION INTRAVENOUS; SUBCUTANEOUS at 18:42

## 2017-11-22 RX ADMIN — Medication 75 MICROGRAM(S): at 18:42

## 2017-11-22 RX ADMIN — POLYETHYLENE GLYCOL 3350 17 GRAM(S): 17 POWDER, FOR SOLUTION ORAL at 18:42

## 2017-11-22 RX ADMIN — Medication 50 MILLIGRAM(S): at 18:42

## 2017-11-22 RX ADMIN — Medication 1 DROP(S): at 21:57

## 2017-11-22 RX ADMIN — FAMOTIDINE 20 MILLIGRAM(S): 10 INJECTION INTRAVENOUS at 18:42

## 2017-11-22 RX ADMIN — PAMIDRONATE DISODIUM 64.17 MILLIGRAM(S): 9 INJECTION, SOLUTION INTRAVENOUS at 18:41

## 2017-11-22 RX ADMIN — SODIUM CHLORIDE 100 MILLILITER(S): 9 INJECTION INTRAMUSCULAR; INTRAVENOUS; SUBCUTANEOUS at 16:22

## 2017-11-22 RX ADMIN — PREGABALIN 500 MICROGRAM(S): 225 CAPSULE ORAL at 19:41

## 2017-11-22 RX ADMIN — SIMVASTATIN 10 MILLIGRAM(S): 20 TABLET, FILM COATED ORAL at 21:57

## 2017-11-22 NOTE — H&P ADULT - PROBLEM SELECTOR PLAN 2
Found to have right 6th rib lytic lesion on CXR from prior admission. Etiologies include breast cancer mets vs. MM vs. lung cancer given extensive smoking history. SPEP from last admission relatively unremarkable. Immunofixation from last admission was unremarkable.   -Plan for IR guided biopsy of lytic lesion  -F/u CA 15-3, CA 29-27, CEA, UPEP  -Heme-onc following; appreciate recs

## 2017-11-22 NOTE — H&P ADULT - NSHPPHYSICALEXAM_GEN_ALL_CORE
Constitutional: Frail, elderly lady, resting comfortably in bed   Head: NC/AT  Eyes: PERRL, EOMI, anicteric sclera  ENT: no nasal discharge; uvula midline, no oropharyngeal erythema or exudates; dry mucous membranes   Neck: supple; no JVD or thyromegaly  Respiratory: CTA B/L; no W/R/R, no retractions  Cardiac: +S1/S2; RRR; no M/R/G  Gastrointestinal: abdomen soft, NT/ND; no rebound or guarding; +BS  Back: spine midline, no bony tenderness or step-offs; no CVAT B/L  Extremities: WWP, no clubbing or cyanosis; no peripheral edema  Musculoskeletal: no joint swelling, tenderness or erythema  Vascular: 2+ radial DP pulses B/L  Dermatologic: skin is warm/dry/intact; bruising over b/l shins  Lymphatic: no submandibular or cervical LAD  Neurologic: AAOx2 (person and date of birth); CNII-XII grossly intact; no focal deficits

## 2017-11-22 NOTE — PATIENT PROFILE ADULT. - ANESTHESIA, PREVIOUS REACTION, PROFILE
cataract surgery; told heart stopped; unsure if from anesthesia. Surgery was at least 10 years ago/heart complications

## 2017-11-22 NOTE — H&P ADULT - ASSESSMENT
89F with a PMH significant for HTN, HLD, Breast CA, AAA repair, former smoker, GERD, anxiety/insomnia, recently admitted from 11/14-11/17 for PNA and hypercalcemia, who presents directly from her oncologist's office, Dr. Valdez, after being found to have hypercalcemia to 12.7 and WBC to 17.

## 2017-11-22 NOTE — H&P ADULT - PROBLEM SELECTOR PLAN 1
Ca elevated to 12.3 on recent admission; found to have Ca of 12.7 during outpatient visit, was 8.5 in May 2017. Patient not currently in thiazides. Lytic lesion seen on 6th rib on prior CXR from 11/6, concerning in the setting of history of breast cancer and smoking history.  During last admission PTRH neg, intact PTH <2  -IVF @ 100cc/h   -Pamidronate 60mg IVPB  -Monitor BMP  -F/u vitamin D serum, PTH, ACE

## 2017-11-22 NOTE — H&P ADULT - PROBLEM SELECTOR PLAN 9
F: IVF at 100cc/h  E: Replete K > 4 and Mg >2  N: DASH/TLC mechanical soft and thin liquids F: IVF at 100cc/h  E: Replete K > 4 and Mg >2  N: mechanical soft and thin liquids w/ ensure enlive BID

## 2017-11-22 NOTE — H&P ADULT - PROBLEM SELECTOR PLAN 5
WBC of 17 on outpatient labs; 12.8 on discharge on 11/17. Recently treated for CAP. Patient reports improvement in cough and denies fevers/chills.   -F/u CXR   -F/u UA   -F/u CBC

## 2017-11-22 NOTE — H&P ADULT - HISTORY OF PRESENT ILLNESS
The patient is a 89 year old woman with a PMH significant for HTN, HLD, Breast CA, former smoker, GERD, anxiety/insomnia, recently admitted from 11/14-11/17 for PNA and hypercalcemia, who presents directly from her oncologist's office, Dr. Valdez, after being found to have hypercalcemia to 12.7 and WBC to 17. The health aide reports that the patient was in rehab and went to visit Dr. Valdez for consultation on her hypercalcemia and bone lesion found on her recent admission. The patient has had decreased appetite with low PO intake, unintentional weight loss, and fatigue prior to her last admission. Her aide notes that the patient is confused at times but notes her mental status has improved since her last admission. Patient endorses tiredness, constipation, myalgias, runny nose, and a non-productive cough which has improved since being discharged. She denies headache, dizziness, chest pain, SOB, palpitations, N/V/D, arthralgias, fevers, and chills. The patient is a 89 year old woman with a PMH significant for HTN, HLD, Breast CA, AAA repair, former smoker, GERD, anxiety/insomnia, recently admitted from 11/14-11/17 for PNA and hypercalcemia, who presents directly from her oncologist's office, Dr. Valdez, after being found to have hypercalcemia to 12.7 and WBC to 17. The health aide reports that the patient was in rehab and went to visit Dr. Valdez for consultation on her hypercalcemia and bone lesion found on her recent admission. The patient has had decreased appetite with low PO intake, unintentional weight loss, and fatigue prior to her last admission. Her aide notes that the patient is confused at times but notes her mental status has improved since her last admission. Patient endorses tiredness, constipation, myalgias, runny nose, and a non-productive cough which has improved since being discharged. She denies headache, dizziness, chest pain, SOB, palpitations, N/V/D, arthralgias, fevers, and chills.

## 2017-11-22 NOTE — H&P ADULT - NSHPSOCIALHISTORY_GEN_ALL_CORE
Patient came from rehab.   Tobacco: Former smoker (~60pack year history per son)  EtOH: social drinker  Recreational Drugs: denies

## 2017-11-23 DIAGNOSIS — G92 TOXIC ENCEPHALOPATHY: ICD-10-CM

## 2017-11-23 LAB
ANION GAP SERPL CALC-SCNC: 9 MMOL/L — SIGNIFICANT CHANGE UP (ref 5–17)
BLD GP AB SCN SERPL QL: NEGATIVE — SIGNIFICANT CHANGE UP
BUN SERPL-MCNC: 31 MG/DL — HIGH (ref 7–23)
CALCIUM SERPL-MCNC: 11.7 MG/DL — HIGH (ref 8.4–10.5)
CHLORIDE SERPL-SCNC: 97 MMOL/L — SIGNIFICANT CHANGE UP (ref 96–108)
CO2 SERPL-SCNC: 27 MMOL/L — SIGNIFICANT CHANGE UP (ref 22–31)
CREAT SERPL-MCNC: 1.06 MG/DL — SIGNIFICANT CHANGE UP (ref 0.5–1.3)
GLUCOSE SERPL-MCNC: 96 MG/DL — SIGNIFICANT CHANGE UP (ref 70–99)
HCT VFR BLD CALC: 26.1 % — LOW (ref 34.5–45)
HGB BLD-MCNC: 8.4 G/DL — LOW (ref 11.5–15.5)
MAGNESIUM SERPL-MCNC: 2 MG/DL — SIGNIFICANT CHANGE UP (ref 1.6–2.6)
MCHC RBC-ENTMCNC: 29.5 PG — SIGNIFICANT CHANGE UP (ref 27–34)
MCHC RBC-ENTMCNC: 32.2 G/DL — SIGNIFICANT CHANGE UP (ref 32–36)
MCV RBC AUTO: 91.6 FL — SIGNIFICANT CHANGE UP (ref 80–100)
PHOSPHATE SERPL-MCNC: 2.2 MG/DL — LOW (ref 2.5–4.5)
PLATELET # BLD AUTO: 276 K/UL — SIGNIFICANT CHANGE UP (ref 150–400)
POTASSIUM SERPL-MCNC: 3.8 MMOL/L — SIGNIFICANT CHANGE UP (ref 3.5–5.3)
POTASSIUM SERPL-SCNC: 3.8 MMOL/L — SIGNIFICANT CHANGE UP (ref 3.5–5.3)
RBC # BLD: 2.85 M/UL — LOW (ref 3.8–5.2)
RBC # FLD: 15.1 % — SIGNIFICANT CHANGE UP (ref 10.3–16.9)
RH IG SCN BLD-IMP: POSITIVE — SIGNIFICANT CHANGE UP
SODIUM SERPL-SCNC: 133 MMOL/L — LOW (ref 135–145)
WBC # BLD: 12.1 K/UL — HIGH (ref 3.8–10.5)
WBC # FLD AUTO: 12.1 K/UL — HIGH (ref 3.8–10.5)

## 2017-11-23 PROCEDURE — 70460 CT HEAD/BRAIN W/DYE: CPT | Mod: 26

## 2017-11-23 PROCEDURE — 99233 SBSQ HOSP IP/OBS HIGH 50: CPT | Mod: GC

## 2017-11-23 RX ORDER — SODIUM CHLORIDE 9 MG/ML
1000 INJECTION INTRAMUSCULAR; INTRAVENOUS; SUBCUTANEOUS
Qty: 0 | Refills: 0 | Status: DISCONTINUED | OUTPATIENT
Start: 2017-11-23 | End: 2017-11-24

## 2017-11-23 RX ORDER — SODIUM CHLORIDE 9 MG/ML
1000 INJECTION INTRAMUSCULAR; INTRAVENOUS; SUBCUTANEOUS
Qty: 0 | Refills: 0 | Status: DISCONTINUED | OUTPATIENT
Start: 2017-11-23 | End: 2017-11-23

## 2017-11-23 RX ORDER — POTASSIUM CHLORIDE 20 MEQ
20 PACKET (EA) ORAL ONCE
Qty: 0 | Refills: 0 | Status: COMPLETED | OUTPATIENT
Start: 2017-11-23 | End: 2017-11-23

## 2017-11-23 RX ADMIN — Medication 20 MILLIEQUIVALENT(S): at 14:28

## 2017-11-23 RX ADMIN — Medication 1 DROP(S): at 05:50

## 2017-11-23 RX ADMIN — Medication 50 MILLIGRAM(S): at 18:27

## 2017-11-23 RX ADMIN — Medication 50 MILLIGRAM(S): at 05:50

## 2017-11-23 RX ADMIN — FAMOTIDINE 20 MILLIGRAM(S): 10 INJECTION INTRAVENOUS at 11:25

## 2017-11-23 RX ADMIN — PREGABALIN 500 MICROGRAM(S): 225 CAPSULE ORAL at 11:25

## 2017-11-23 RX ADMIN — Medication 75 MICROGRAM(S): at 05:51

## 2017-11-23 RX ADMIN — Medication 1 DROP(S): at 14:28

## 2017-11-23 RX ADMIN — HEPARIN SODIUM 5000 UNIT(S): 5000 INJECTION INTRAVENOUS; SUBCUTANEOUS at 05:50

## 2017-11-23 RX ADMIN — POLYETHYLENE GLYCOL 3350 17 GRAM(S): 17 POWDER, FOR SOLUTION ORAL at 11:25

## 2017-11-23 RX ADMIN — SIMVASTATIN 10 MILLIGRAM(S): 20 TABLET, FILM COATED ORAL at 21:42

## 2017-11-23 RX ADMIN — HEPARIN SODIUM 5000 UNIT(S): 5000 INJECTION INTRAVENOUS; SUBCUTANEOUS at 18:27

## 2017-11-23 RX ADMIN — SODIUM CHLORIDE 100 MILLILITER(S): 9 INJECTION INTRAMUSCULAR; INTRAVENOUS; SUBCUTANEOUS at 03:31

## 2017-11-23 RX ADMIN — Medication 1 DROP(S): at 21:42

## 2017-11-23 NOTE — PROGRESS NOTE ADULT - PROBLEM SELECTOR PLAN 5
WBC of 17 on outpatient labs; 12.8 on discharge on 11/17. Recently treated for CAP. Patient reports improvement in cough and denies fevers/chills.   -WBC downtrending 12.1 in AM, Afebrile overnight. Will continue to monitor with daily CBCs. No abxs warranted at this time.   -F/u official read of CXR, questionable new haziness along R heart border (compared to 11/16/17 CXR), possible infiltrate.   -F/u UA   -F/u CBC -Famotidine 20mg q24h

## 2017-11-23 NOTE — PROGRESS NOTE ADULT - PROBLEM SELECTOR PLAN 10
F: IVF at 100cc/h  E: Replete K > 4 and Mg >2  N: mechanical soft and thin liquids w/ ensure enlive BID  DVT ppx: SQH 5000 u q12h   DNR/DNI   Dispo: 7W

## 2017-11-23 NOTE — PROGRESS NOTE ADULT - PROBLEM SELECTOR PLAN 4
Patient with history of hypothyroidism for which she takes levothyroxine 75 mcg/day. Well controlled, TSH -4.212 (WNL)  -C/w home med WBC of 17 on outpatient labs; 12.8 on discharge on 11/17. Recently treated for CAP. Patient reports improvement in cough and denies fevers/chills.   -WBC downtrending 12.1 in AM, Afebrile overnight. Will continue to monitor with daily CBCs. No abxs warranted at this time.   -F/u official read of CXR, questionable new haziness along R heart border (compared to 11/16/17 CXR), possible infiltrate.   -F/u UA   -c/w daily CBCs

## 2017-11-23 NOTE — PROGRESS NOTE ADULT - PROBLEM SELECTOR PLAN 6
Patient with hx of HLD, on home med Atorvastatin 10mg qhs  -c/w home med Patient with history of hypothyroidism for which she takes levothyroxine 75 mcg/day. Well controlled, TSH -4.212 (WNL)  -C/w home med

## 2017-11-23 NOTE — PROGRESS NOTE ADULT - PROBLEM SELECTOR PLAN 8
Patient with reported BM yesterday, well formed.   -Miralax daily  -Monitor BM in setting of elevated Ca -Patient with hx of HTN on home med, Metoprolol 50mg BID  -c/w home med

## 2017-11-23 NOTE — PHYSICAL THERAPY INITIAL EVALUATION ADULT - GAIT DEVIATIONS NOTED, PT EVAL
sequencing with RW decreased, dec foot clearance with RLE/decreased austyn/decreased step length/decreased stride length/decreased weight-shifting ability

## 2017-11-23 NOTE — PROGRESS NOTE ADULT - PROBLEM SELECTOR PLAN 2
Found to have right 6th rib lytic lesion on CXR from prior admission. Etiologies include breast cancer mets vs. MM vs. lung cancer given extensive smoking history. SPEP from last admission relatively unremarkable. Immunofixation from last admission was unremarkable.   -Plan for IR guided biopsy of lytic lesion  -F/u CA 15-3, CA 29-27, CEA, UPEP  -Heme-onc following; appreciate recs Ca elevated to 12.3 on recent admission; found to have Ca of 12.7 during outpatient visit, was 8.5 in May 2017. Patient not currently in thiazides. Lytic lesion seen on 6th rib on prior CXR from 11/6, concerning in the setting of history of breast cancer and 60 PY smoking history.  During last admission PTRH neg, intact PTH <2  -sCa downtrending, 11.7 today (corrected sCa in setting of hypoalbuminemia 13.7)  -Patient still appears very dry on examination, will increase IVF to 150 cc/hr (lungs clear on exam today)  -s/p Pamidronate 60mg IVPB x1  -Continue to monitor sCa with daily BMP  -F/u vitamin D serum, PTH, ACE Ca elevated to 12.3 on recent admission; found to have Ca of 12.7 during outpatient visit, was 8.5 in May 2017. Patient not currently in thiazides. Lytic lesion seen on 6th rib on prior CXR from 11/6, concerning in the setting of history of breast cancer and 60 PY smoking history.  During last admission PTRH neg, intact PTH <2  -sCa downtrending, 11.7 today (corrected sCa in setting of hypoalbuminemia 13.7)  -Given high suspicion of malignancy, IR consulted for bx of rib lytic lesion  -Patient still appears very dry on examination, will increase IVF to 150 cc/hr (lungs clear on exam today)  -s/p Pamidronate 60mg IVPB x1  -Continue to monitor sCa with daily BMP  -F/u vitamin D serum, PTH, ACE

## 2017-11-23 NOTE — CONSULT NOTE ADULT - SUBJECTIVE AND OBJECTIVE BOX
History of Present Illness:  Chief Complaint/Reason for Admission: direct admit for hypercalcemia and bone biopsy	  History of Present Illness: 	  The patient is a 89 year old woman with a PMH significant for HTN, HLD, Breast CA, AAA repair, former smoker, GERD, anxiety/insomnia, recently admitted from - for PNA and hypercalcemia, who presents directly after being found to have hypercalcemia to 12.7 and WBC to 17. The health aide reports that the patient was in rehab and went for consultation on her hypercalcemia and bone lesion found on her recent admission. The patient has had decreased appetite with low PO intake, unintentional weight loss, and fatigue prior to her last admission. Her aide notes that the patient is confused at times but notes her mental status has improved since her last admission. Patient endorses tiredness, constipation, myalgias, runny nose, and a non-productive cough which has improved since being discharged. She denies headache, dizziness, chest pain, SOB, palpitations, N/V/D, arthralgias, fevers, and chills.      Review of Systems:  Other Review of Systems: All other review of systems negative, except as noted in HPI	      Allergies and Intolerances:        Allergies:  	No Known Allergies:     Home Medications:   * Patient Currently Takes Medications as of 2017 14:01 documented in Structured Notes  · 	famotidine 20 mg oral tablet: 1 tab(s) orally once a day  · 	metoprolol tartrate 50 mg oral tablet: 1 tab(s) orally 2 times a day  · 	Synthroid 75 mcg (0.075 mg) oral tablet: 1 tab(s) orally once a day  · 	Vitamin B12 500 mcg oral tablet: 1 tab(s) orally once a day  · 	zolpidem 5 mg oral tablet: 1 tab(s) orally once a day (at bedtime), As Needed  · 	sodium chloride, hypertonic 5% ophthalmic solution: 1 application to each affected eye 3 times a day  · 	Colace 50 mg oral capsule: 1 cap(s) orally 2 times a day, As Needed for constipation  · 	simvastatin 10 mg oral tablet: 1 tab(s) orally once a day (at bedtime  .  Patient History:    Past Medical History:  Anxiety    Breast cancer in female    GERD (gastroesophageal reflux disease)    HTN (hypertension)    Hyperlipidemia.     Past Surgical History:  H/O lumpectomy    H/O:     History of AAA (abdominal aortic aneurysm) repair.     Family History:  No pertinent family history in first degree relatives.     Social History:  Social History (marital status, living situation, occupation, tobacco use, alcohol and drug use, and sexual history): Patient came from rehab.   Tobacco: Former smoker (~60pack year history per son)  EtOH: social drinker Recreational Drugs: denies	     Tobacco Screening:  · Core Measure Site	No	  · Has the patient used tobacco in the past 30 days?	No	    Risk Assessment:    Present on Admission:  Deep Venous Thrombosis	no	  Pulmonary Embolus	no	  Urinary Catheter	no	  Central Venous Catheter/PICC Line	no	  Surgical Site Incision	no	  Pressure Ulcer(s)	no	     Heart Failure:  Does this patient have a history of or has been diagnosed with heart failure? no.       Physical Exam:  Physical Exam: Constitutional: Frail, elderly lady, resting comfortably in bed   Head: NC/AT  Eyes: PERRL, EOMI, anicteric sclera  ENT: no nasal discharge; uvula midline, no oropharyngeal erythema or exudates; dry mucous membranes   Neck: supple; no JVD or thyromegaly  Respiratory: CTA B/L; no W/R/R, no retractions  Cardiac: +S1/S2; RRR; no M/R/G  Gastrointestinal: abdomen soft, NT/ND; no rebound or guarding; +BS  Back: spine midline, no bony tenderness or step-offs; no CVAT B/L  Extremities: WWP, no clubbing or cyanosis; no peripheral edema  Musculoskeletal: no joint swelling, tenderness or erythema  Vascular: 2+ radial DP pulses B/L  Dermatologic: skin is warm/dry/intact; bruising over b/l shins  Lymphatic: no submandibular or cervical LAD Neurologic: AAOx2 (person and date of birth); CNII-XII grossly intact; no focal deficits	       Labs and Results:  Labs, Radiology, Cardiology, and Other Results: .  LABS:                       8.4   12.1  )-----------( 276                       26.1  133<L>  |  97  |  31<H> ----------------------------<  96 3.8   |  27  |  1.06  Ca    11.7<H>      2017 07:12 Phos  2.2     11-23 Mg     2.0     11-23  TPro  8.2  /  Alb  2.8<L>  /  TBili  0.2  /  DBili  x   /  AST  28  /  ALT  32  /  AlkPhos  83     RADIOLOGY, EKG & ADDITIONAL TESTS: Reviewed.	    Assessment and Plan:    Assessment:  · Assessment		  89F with a PMH significant for HTN, HLD, Breast CA, AAA repair, former smoker, GERD, anxiety/insomnia, recently admitted from - for PNA and hypercalcemia, who presents directly from her oncologist's office, Dr. Valdez, after being found to have hypercalcemia to 12.7 and WBC to 17.     Problem/Plan - 1:  ·  Problem: Hypercalcemia.  Plan: Ca elevated to 12.3 on recent admission; found to have Ca of 12.7 during outpatient visit, was 8.5 in May 2017. Patient not currently in thiazides. Lytic lesion seen on 6th rib on prior CXR from , concerning in the setting of history of breast cancer and smoking history.  During last admission PTRH neg, intact PTH <2  -IV Hydration   -s/p Pamidronate 60mg IVPB - may require repeat dose  -Monitor BMP  -F/u vitamin D serum, PTH, ACE.      Problem/Plan - 2:  ·  Problem: Rib lesion.  Plan: Found to have right 6th rib lytic lesion on CXR from prior admission. Etiologies include breast cancer mets vs. MM vs. lung cancer given extensive smoking history. SPEP from last admission relatively unremarkable. Immunofixation from last admission was unremarkable.   -Plan for IR guided biopsy of lytic lesion  -F/u CA 15-3, CA 29-27, CEA, UPEP     Problem/Plan - 3:  ·  Problem: GERD (gastroesophageal reflux disease).  Plan: -Famotidine 20mg q24h.      Problem/Plan - 4:  ·  Problem: Hypothyroidism.  Plan: -F/u TSH   -C/w levothyroxine 75mcg/daily.      Problem/Plan - 5:  ·  Problem: Leukocytosis.  Plan: WBC of 17 on outpatient labs; 12.8 on discharge on . Recently treated for CAP. Patient reports improvement in cough and denies fevers/chills.   -F/u CXR   -F/u UA   -F/u CBC.      Problem/Plan - 6:  Problem: HLD (hyperlipidemia). Plan: -Atorvastatin 10mg qhs.     Problem/Plan - 7:  ·  Problem: HTN (hypertension).  Plan: -Metoprolol 50mg BID.      Problem/Plan - 8:  ·  Problem: Constipation.  Plan: -Miralax daily  -Monitor BM.      Problem/Plan - 9:  ·  Problem: Nutrition, metabolism, and development symptoms.  Plan: F: IVF at 100cc/h  E: Replete K > 4 and Mg >2  N: mechanical soft and thin liquids w/ ensure enlive BID.      Problem/Plan - 10:  Problem: Prophylactic measure. Plan; DVT ppx: SQH     DNR/DNI

## 2017-11-23 NOTE — PROGRESS NOTE ADULT - SUBJECTIVE AND OBJECTIVE BOX
OVERNIGHT EVENTS: No acute overnight events. Afebrile, HD stable.     SUBJECTIVE / INTERVAL HPI: Patient seen and examined at bedside. Patient notes that she continues urinate frequently. Denies any fevers, chills, NS, chest pain, shortness of breath. Remainder of ROS negative. Of note, HHA also present during encounter this morning and reporting that the patient is more confused than usual. Normally more verbose at baseline.     VITAL SIGNS:  Vital Signs Last 24 Hrs  T(C): 37.2 (23 Nov 2017 05:46), Max: 37.2 (23 Nov 2017 05:46)  T(F): 99 (23 Nov 2017 05:46), Max: 99 (23 Nov 2017 05:46)  HR: 101 (23 Nov 2017 05:46) (82 - 101)  BP: 158/87 (23 Nov 2017 05:46) (108/71 - 158/87)  BP(mean): --  RR: 16 (23 Nov 2017 05:46) (16 - 18)  SpO2: 97% (23 Nov 2017 05:46) (97% - 98%)    PHYSICAL EXAM:      Constitutional: Frail, elderly lady, resting comfortably in bed, dry, HHA at bedside  	HEENT: NC/AT, PERRL, EOMI, anicteric sclera, uvula midline, no oropharyngeal erythema or exudates; dry mucous membranes   	Neck: supple    Lymph: no submandibular or cervical LAD  	Respiratory: CTA B/L; no W/R/R, no retractions  	Cardiac: +S1/S2; RRR; no M/R/G appreciated on auscultation  	Gastrointestinal: abdomen soft, NT/ND; no rebound or guarding; +BS  	Extremities: WWP, no clubbing or cyanosis; no peripheral edema, large ecchymoses overlying shins b/l from recent falls  	Musculoskeletal: no joint swelling, tenderness or erythema  	Vascular: 2+ radial DP pulses B/L, cool extremities  	Dermatologic: skin is warm/dry/intact; scattered bruising over b/l shins (HHA reports falls more recently)    Neurologic: AAOx2 (person and date of birth); CNII-XII grossly intact; no focal deficits    MEDICATIONS:  MEDICATIONS  (STANDING):  cyanocobalamin 500 MICROGram(s) Oral daily  famotidine    Tablet 20 milliGRAM(s) Oral daily  heparin  Injectable 5000 Unit(s) SubCutaneous every 12 hours  influenza   Vaccine 0.5 milliLiter(s) IntraMuscular once  levothyroxine 75 MICROGram(s) Oral daily  metoprolol     tartrate 50 milliGRAM(s) Oral two times a day  polyethylene glycol 3350 17 Gram(s) Oral daily  simvastatin 10 milliGRAM(s) Oral at bedtime  sodium chloride 0.9%. 1000 milliLiter(s) (100 mL/Hr) IV Continuous <Continuous>  sodium chloride 5% Solution 1 Drop(s) Both EYES three times a day    MEDICATIONS  (PRN):  acetaminophen   Tablet. 650 milliGRAM(s) Oral every 6 hours PRN Mild Pain (1 - 3)      ALLERGIES:  Allergies    No Known Allergies    Intolerances        LABS:                        8.4    12.1  )-----------( 276      ( 23 Nov 2017 07:12 )             26.1     11-23    133<L>  |  97  |  31<H>  ----------------------------<  96  3.8   |  27  |  1.06    Ca    11.7<H>      23 Nov 2017 07:12  Phos  2.2     11-23  Mg     2.0     11-23    TPro  8.2  /  Alb  2.8<L>  /  TBili  0.2  /  DBili  x   /  AST  28  /  ALT  32  /  AlkPhos  83  11-22    PT/INR - ( 22 Nov 2017 16:57 )   PT: 11.4 sec;   INR: 1.03          PTT - ( 22 Nov 2017 16:57 )  PTT:30.1 sec    CAPILLARY BLOOD GLUCOSE          RADIOLOGY & ADDITIONAL TESTS: Reviewed.    ASSESSMENT:    PLAN:

## 2017-11-23 NOTE — PROGRESS NOTE ADULT - PROBLEM SELECTOR PLAN 1
Ca elevated to 12.3 on recent admission; found to have Ca of 12.7 during outpatient visit, was 8.5 in May 2017. Patient not currently in thiazides. Lytic lesion seen on 6th rib on prior CXR from 11/6, concerning in the setting of history of breast cancer and smoking history.  During last admission PTRH neg, intact PTH <2  -IVF @ 100cc/h   -s/p Pamidronate 60mg IVPB x1  -Continue to monitor BMP  -F/u vitamin D serum, PTH, ACE Patient with recent change over the last couple of weeks in mental status per HHA (at bedside today). Patient AA0x2 on admission (unchanged today), per HHA AA0x3 normally. Likely multifactorial. Possibly 2/2 to hypercalcemia vs malignancy vs infectious etiology vs dehydration.  -HHA reporting recent history of falls, ecchymoses overlying anterior shins noted on exam. Unclear etiology, will obtain CT head w/ contrast given history of prior malignancy and possibility of recurrence. f/u results  -Unlikely infectious in etiology, WBC downtrending, afebrile, VSS, will continue to monitor.  -Ca downtrending, will continue to monitor with daily BMPs

## 2017-11-23 NOTE — PHYSICAL THERAPY INITIAL EVALUATION ADULT - ADDITIONAL COMMENTS
Pt presents to [facility] from rehab. HHA by her side to answer social history questions. Per aide pt was working on standing up with RW and assist.

## 2017-11-23 NOTE — PROGRESS NOTE ADULT - PROBLEM SELECTOR PLAN 9
IVF at 100cc/h  mechanical soft and thin liquids w/ ensure enlive BID Patient with reported BM yesterday, well formed.   -Miralax daily  -Monitor BM in setting of elevated Ca

## 2017-11-23 NOTE — PHYSICAL THERAPY INITIAL EVALUATION ADULT - IMPAIRMENTS FOUND, PT EVAL
aerobic capacity/endurance/gait, locomotion, and balance/muscle strength/posture/poor safety awareness

## 2017-11-23 NOTE — PROGRESS NOTE ADULT - PROBLEM SELECTOR PLAN 3
-Famotidine 20mg q24h Found to have right 6th rib lytic lesion on CXR from prior admission. Etiologies include breast cancer mets vs. MM vs. lung cancer given extensive smoking history. SPEP from last admission relatively unremarkable. Immunofixation from last admission was unremarkable.   -Plan for IR guided biopsy of lytic lesion  -F/u CA 15-3, CA 29-27, CEA, UPEP  -Heme-onc following; appreciate recs

## 2017-11-23 NOTE — PHYSICAL THERAPY INITIAL EVALUATION ADULT - TRANSFER SAFETY CONCERNS NOTED: SIT/STAND, REHAB EVAL
decreased safety awareness/decreased sequencing ability/decreased step length/decreased weight-shifting ability

## 2017-11-23 NOTE — PROGRESS NOTE ADULT - ASSESSMENT
89 F former 60 PY smoker with a PMH significant for HTN, HLD, Breast CA, AAA repair, former smoker, GERD, anxiety/insomnia, recently admitted from 11/14-11/17 for PNA and hypercalcemia, who presents directly from her oncologist's office, Dr. Valdez, after being found to have hypercalcemia to 12.7 and WBC to 17, admitted to [address] [place] [address] for workup of underlying malignancy given high suspicion.

## 2017-11-23 NOTE — PROGRESS NOTE ADULT - PROBLEM SELECTOR PLAN 7
-Patient with hx of HTN on home med, Metoprolol 50mg BID  -c/w home med Patient with hx of HLD, on home med Atorvastatin 10mg qhs  -c/w home med

## 2017-11-24 LAB
ANION GAP SERPL CALC-SCNC: 10 MMOL/L — SIGNIFICANT CHANGE UP (ref 5–17)
BCA 255 TISS QL IMSTN: 28.1 U/ML — SIGNIFICANT CHANGE UP
BUN SERPL-MCNC: 24 MG/DL — HIGH (ref 7–23)
CA-I BLDA-SCNC: 1.45 MMOL/L — HIGH (ref 1.12–1.3)
CALCIUM SERPL-MCNC: 10.5 MG/DL — SIGNIFICANT CHANGE UP (ref 8.4–10.5)
CALCIUM SERPL-MCNC: 12.6 MG/DL — HIGH (ref 8.4–10.5)
CANCER AG27-29 SERPL-ACNC: 30 U/ML — SIGNIFICANT CHANGE UP (ref 0–37.7)
CEA SERPL-MCNC: 8.6 NG/ML — HIGH (ref 0–3.8)
CHLORIDE SERPL-SCNC: 99 MMOL/L — SIGNIFICANT CHANGE UP (ref 96–108)
CO2 SERPL-SCNC: 24 MMOL/L — SIGNIFICANT CHANGE UP (ref 22–31)
CREAT SERPL-MCNC: 1.01 MG/DL — SIGNIFICANT CHANGE UP (ref 0.5–1.3)
GLUCOSE SERPL-MCNC: 120 MG/DL — HIGH (ref 70–99)
HCT VFR BLD CALC: 23.8 % — LOW (ref 34.5–45)
HGB BLD-MCNC: 7.4 G/DL — LOW (ref 11.5–15.5)
MAGNESIUM SERPL-MCNC: 1.9 MG/DL — SIGNIFICANT CHANGE UP (ref 1.6–2.6)
MCHC RBC-ENTMCNC: 29.4 PG — SIGNIFICANT CHANGE UP (ref 27–34)
MCHC RBC-ENTMCNC: 31.1 G/DL — LOW (ref 32–36)
MCV RBC AUTO: 94.4 FL — SIGNIFICANT CHANGE UP (ref 80–100)
PLATELET # BLD AUTO: 264 K/UL — SIGNIFICANT CHANGE UP (ref 150–400)
POTASSIUM SERPL-MCNC: 3.6 MMOL/L — SIGNIFICANT CHANGE UP (ref 3.5–5.3)
POTASSIUM SERPL-SCNC: 3.6 MMOL/L — SIGNIFICANT CHANGE UP (ref 3.5–5.3)
PTH-INTACT FLD-MCNC: 2 PG/ML — LOW (ref 15–65)
RBC # BLD: 2.52 M/UL — LOW (ref 3.8–5.2)
RBC # FLD: 14.9 % — SIGNIFICANT CHANGE UP (ref 10.3–16.9)
SODIUM SERPL-SCNC: 133 MMOL/L — LOW (ref 135–145)
VIT D25+D1,25 OH+D1,25 PNL SERPL-MCNC: 20.9 PG/ML — SIGNIFICANT CHANGE UP (ref 19.9–79.3)
WBC # BLD: 8.8 K/UL — SIGNIFICANT CHANGE UP (ref 3.8–10.5)
WBC # FLD AUTO: 8.8 K/UL — SIGNIFICANT CHANGE UP (ref 3.8–10.5)

## 2017-11-24 PROCEDURE — 99233 SBSQ HOSP IP/OBS HIGH 50: CPT | Mod: GC

## 2017-11-24 RX ORDER — POTASSIUM CHLORIDE 20 MEQ
40 PACKET (EA) ORAL ONCE
Qty: 0 | Refills: 0 | Status: DISCONTINUED | OUTPATIENT
Start: 2017-11-24 | End: 2017-11-24

## 2017-11-24 RX ORDER — POTASSIUM CHLORIDE 20 MEQ
40 PACKET (EA) ORAL ONCE
Qty: 0 | Refills: 0 | Status: COMPLETED | OUTPATIENT
Start: 2017-11-24 | End: 2017-11-24

## 2017-11-24 RX ORDER — SODIUM CHLORIDE 9 MG/ML
1000 INJECTION INTRAMUSCULAR; INTRAVENOUS; SUBCUTANEOUS
Qty: 0 | Refills: 0 | Status: DISCONTINUED | OUTPATIENT
Start: 2017-11-24 | End: 2017-11-27

## 2017-11-24 RX ORDER — POLYETHYLENE GLYCOL 3350 17 G/17G
17 POWDER, FOR SOLUTION ORAL
Qty: 0 | Refills: 0 | Status: DISCONTINUED | OUTPATIENT
Start: 2017-11-24 | End: 2017-11-29

## 2017-11-24 RX ADMIN — Medication 1 DROP(S): at 06:33

## 2017-11-24 RX ADMIN — SODIUM CHLORIDE 70 MILLILITER(S): 9 INJECTION INTRAMUSCULAR; INTRAVENOUS; SUBCUTANEOUS at 21:29

## 2017-11-24 RX ADMIN — Medication 50 MILLIGRAM(S): at 06:33

## 2017-11-24 RX ADMIN — HEPARIN SODIUM 5000 UNIT(S): 5000 INJECTION INTRAVENOUS; SUBCUTANEOUS at 17:47

## 2017-11-24 RX ADMIN — PREGABALIN 500 MICROGRAM(S): 225 CAPSULE ORAL at 11:50

## 2017-11-24 RX ADMIN — Medication 75 MICROGRAM(S): at 06:33

## 2017-11-24 RX ADMIN — Medication 50 MILLIGRAM(S): at 17:47

## 2017-11-24 RX ADMIN — Medication 1 DROP(S): at 21:29

## 2017-11-24 RX ADMIN — HEPARIN SODIUM 5000 UNIT(S): 5000 INJECTION INTRAVENOUS; SUBCUTANEOUS at 06:33

## 2017-11-24 RX ADMIN — POLYETHYLENE GLYCOL 3350 17 GRAM(S): 17 POWDER, FOR SOLUTION ORAL at 17:47

## 2017-11-24 RX ADMIN — FAMOTIDINE 20 MILLIGRAM(S): 10 INJECTION INTRAVENOUS at 11:50

## 2017-11-24 RX ADMIN — Medication 40 MILLIEQUIVALENT(S): at 10:02

## 2017-11-24 RX ADMIN — Medication 10 MILLIGRAM(S): at 10:02

## 2017-11-24 RX ADMIN — SIMVASTATIN 10 MILLIGRAM(S): 20 TABLET, FILM COATED ORAL at 21:29

## 2017-11-24 NOTE — PROGRESS NOTE ADULT - PROBLEM SELECTOR PLAN 1
Ca elevated to 12.3 on recent admission; found to have Ca of 12.7 during outpatient visit, was 8.5 in May 2017. Patient not currently in thiazides. Lytic lesion seen on 6th rib on prior CXR from 11/6, concerning in the setting of history of breast cancer and 60 PY smoking history.  During last admission PTRH neg, intact PTH <2. S/p Pamindronate 11/22.  -sCa continues to downtrend, 10.5 today (corrected ~11.3)  -Given high suspicion of malignancy, IR consulted for bx of rib lytic lesion  -Will continue IVF @ 150 cc/hr in setting of hypercalcemia, monitor fluid status.   -s/p Pamidronate 60mg IVPB x1  -Continue to monitor sCa with daily BMP  -F/u vitamin D serum, PTH, ACE

## 2017-11-24 NOTE — PROGRESS NOTE ADULT - PROBLEM SELECTOR PLAN 6
Patient with history of hypothyroidism for which she takes levothyroxine 75 mcg/day. Well controlled, TSH -4.212 (WNL)  -C/w home med

## 2017-11-24 NOTE — PROGRESS NOTE ADULT - PROBLEM SELECTOR PLAN 9
Patients last BM 11/22 after receiving enema in nursing home. Abdomen slightly distended.   -Miralax daily  -Dulcolax suppository daily PRN  -Monitor BM in setting of elevated Ca

## 2017-11-24 NOTE — PROGRESS NOTE ADULT - SUBJECTIVE AND OBJECTIVE BOX
· Subjective and Objective: 	  History of Present Illness: 	  The patient is a 89 year old woman with a PMH significant for HTN, HLD, Breast CA, AAA repair, former smoker, GERD, anxiety/insomnia, recently admitted from - for PNA and hypercalcemia, who presents directly after being found to have hypercalcemia to 12.7 and WBC to 17. The health aide reports that the patient was in rehab and went for consultation on her hypercalcemia and bone lesion found on her recent admission. The patient has had decreased appetite with low PO intake, unintentional weight loss, and fatigue prior to her last admission. Her aide notes that the patient is confused at times but notes her mental status has improved since her last admission. Patient endorses tiredness, constipation, myalgias, runny nose, and a non-productive cough which has improved since being discharged. She denies headache, dizziness, chest pain, SOB, palpitations, N/V/D, arthralgias, fevers, and chills.      Review of Systems:  Other Review of Systems: All other review of systems negative, except as noted in HPI	      Allergies and Intolerances:        Allergies:  	No Known Allergies:     Home Medications:   * Patient Currently Takes Medications as of 2017 14:01 documented in Structured Notes  · 	famotidine 20 mg oral tablet: 1 tab(s) orally once a day  · 	metoprolol tartrate 50 mg oral tablet: 1 tab(s) orally 2 times a day  · 	Synthroid 75 mcg (0.075 mg) oral tablet: 1 tab(s) orally once a day  · 	Vitamin B12 500 mcg oral tablet: 1 tab(s) orally once a day  · 	zolpidem 5 mg oral tablet: 1 tab(s) orally once a day (at bedtime), As Needed  · 	sodium chloride, hypertonic 5% ophthalmic solution: 1 application to each affected eye 3 times a day  · 	Colace 50 mg oral capsule: 1 cap(s) orally 2 times a day, As Needed for constipation  · 	simvastatin 10 mg oral tablet: 1 tab(s) orally once a day (at bedtime  .  Patient History:    Past Medical History:  Anxiety    Breast cancer in female    GERD (gastroesophageal reflux disease)    HTN (hypertension)    Hyperlipidemia.     Past Surgical History:  H/O lumpectomy    H/O:     History of AAA (abdominal aortic aneurysm) repair.     Family History:  No pertinent family history in first degree relatives.     Social History:  Social History (marital status, living situation, occupation, tobacco use, alcohol and drug use, and sexual history): Patient came from rehab.   Tobacco: Former smoker (~60pack year history per son)  EtOH: social drinker Recreational Drugs: denies	       Heart Failure:  Does this patient have a history of or has been diagnosed with heart failure? no.       Physical Exam:  Physical Exam: Constitutional: Frail, elderly lady, resting comfortably in bed   Head: NC/AT  Eyes: PERRL, EOMI, anicteric sclera  ENT: no nasal discharge; uvula midline, no oropharyngeal erythema or exudates; dry mucous membranes   Neck: supple; no JVD or thyromegaly  Respiratory: CTA B/L; no W/R/R, no retractions  Cardiac: +S1/S2; RRR; no M/R/G  Gastrointestinal: abdomen soft, NT/ND; no rebound or guarding; +BS  Back: spine midline, no bony tenderness or step-offs; no CVAT B/L  Extremities: WWP, no clubbing or cyanosis; no peripheral edema  Musculoskeletal: no joint swelling, tenderness or erythema  Vascular: 2+ radial DP pulses B/L  Dermatologic: skin is warm/dry/intact; bruising over b/l shins  Lymphatic: no submandibular or cervical LAD Neurologic: AAOx2 (person and date of birth); CNII-XII grossly intact; no focal deficits	       Labs and Results:  Labs, Radiology, Cardiology, and Other Results: .  LABS:                      7.4   8.8   )-----------( 264      ( 2017 08:02 )            23.8  133<L>  |  99  |  24<H> ----------------------------<  120<H> 3.6   |  24  |  1.01  Ca    10.5      2017 08:02 Phos  2.2     11-23 Mg     1.9     11-24        RADIOLOGY, EKG & ADDITIONAL TESTS: Reviewed.	    Assessment and Plan:    Assessment:  · Assessment		  89F with a PMH significant for HTN, HLD, Breast CA, AAA repair, former smoker, GERD, anxiety/insomnia, recently admitted from - for PNA and hypercalcemia, who presents directly from her oncologist's office, Dr. Valdez, after being found to have hypercalcemia to 12.7 and WBC to 17.     Problem/Plan - 1:  ·  Problem: Hypercalcemia.  Plan: Ca elevated to 12.3 on recent admission; found to have Ca of 12.7 during outpatient visit, was 8.5 in May 2017. Patient not currently in thiazides. Lytic lesion seen on 6th rib on prior CXR from , concerning in the setting of history of breast cancer and smoking history.  During last admission PTRH neg, intact PTH <2  -IV Hydration   -s/p Pamidronate 60mg IVPB - may require repeat dose  -Monitor BMP closely  -F/u vitamin D serum, PTH, ACE.      Problem/Plan - 2:  ·  Problem: Rib lesion.  Plan: Found to have right 6th rib lytic lesion on CXR from prior admission. Etiologies include breast cancer mets vs. MM vs. lung cancer given extensive smoking history. SPEP from last admission relatively unremarkable. Immunofixation from last admission was unremarkable.   -Plan for IR guided biopsy of lytic lesion     Problem/Plan - 3:  ·  Problem: GERD (gastroesophageal reflux disease).  Plan: -Famotidine 20mg q24h.      Problem/Plan - 4:  ·  Problem: Hypothyroidism.  Plan: -F/u TSH   -C/w levothyroxine 75mcg/daily.      Problem/Plan - 5:  ·  Problem: Leukocytosis.  Plan: WBC of 17 on outpatient labs; 12.8 on discharge on . Recently treated for CAP. Patient reports improvement in cough and denies fevers/chills.   -F/u CXR   -F/u UA   -F/u CBC.      Problem/Plan - 6:  Problem: Anemia  - labs c/w mixed iron deficiency and anemia of chronic disease  - transfuse PRBCs if Hb < 7 g/dl     Problem/Plan - 7:  ·  Problem: HTN (hypertension).  Plan: -Metoprolol 50mg BID.      Problem/Plan - 8:  ·  Problem: Constipation.  Plan: -Miralax daily  -Monitor BM.      Problem/Plan - 9:  ·  Problem: Nutrition, metabolism, and development symptoms.  Plan: F: IVF at 100cc/h  E: Replete K > 4 and Mg >2  N: mechanical soft and thin liquids w/ ensure enlive BID.      Problem/Plan - 10:  Problem: Prophylactic measure. Plan; DVT ppx: SQH     DNR/DNI

## 2017-11-24 NOTE — PROGRESS NOTE ADULT - PROBLEM SELECTOR PLAN 3
Patient with recent change over the last couple of weeks in mental status per HHA (at bedside today). Patient AA0x2 on admission (unchanged today), per HHA AA0x3 normally. Likely multifactorial. Possibly 2/2 to hypercalcemia vs malignancy vs infectious etiology vs dehydration. Head CT negative for ICH or recent infarction. Now improving.   -Unlikely infectious in etiology, WBC downtrending, afebrile, VSS, will continue to monitor.  -Ca downtrending, will continue to monitor with daily BMPs

## 2017-11-24 NOTE — PROGRESS NOTE ADULT - PROBLEM SELECTOR PLAN 4
WBC of 17 on outpatient labs; 12.8 on discharge on 11/17. Recently treated for CAP. Patient reports improvement in cough and denies fevers/chills. WBC downtrended (8.8).   -Will continue to monitor with daily CBCs. No abxs warranted at this time.   -F/u official read of CXR, questionable new haziness along R heart border (compared to 11/16/17 CXR), possible infiltrate.   -F/u UA   -c/w daily CBCs

## 2017-11-24 NOTE — PROGRESS NOTE ADULT - PROBLEM SELECTOR PLAN 10
F: IVF at 150cc/h, monitor fluid status  E: Replete K > 4 and Mg >2  N: mechanical soft and thin liquids w/ ensure enlive BID  DVT ppx: SQH 5000 u q12h   DNR/DNI   Dispo: 7W

## 2017-11-24 NOTE — PROGRESS NOTE ADULT - SUBJECTIVE AND OBJECTIVE BOX
O/N Events: PRASHANTH    Subjective: Patient seen and examined at bedside. She reports mild SOB, otherwise no complaints. She denies any f/c, CP, SOB, or abdominal pain. The patient reports feeling "loopy", however her her aide at bedside states she is at baseline. She has not had a BM since Tuesday after having enema Monday night, prior to that, her last BM was one week ago.     VITALS  Vital Signs Last 24 Hrs  T(C): 36.2 (24 Nov 2017 05:27), Max: 37.1 (23 Nov 2017 13:05)  T(F): 97.2 (24 Nov 2017 05:27), Max: 98.7 (23 Nov 2017 13:05)  HR: 84 (24 Nov 2017 05:27) (84 - 96)  BP: 147/74 (24 Nov 2017 05:27) (137/79 - 154/96)  BP(mean): --  RR: 16 (24 Nov 2017 05:27) (16 - 20)  SpO2: 95% (24 Nov 2017 05:27) (95% - 98%)    I&O's Summary    23 Nov 2017 07:01  -  24 Nov 2017 07:00  --------------------------------------------------------  IN: 1650 mL / OUT: 0 mL / NET: 1650 mL      PHYSICAL EXAM  General: Sleeping comfortably, awakens easily; NAD  Head: NC/AT; MMM  Eyes: anicteric sclera  Neck: Supple; no JVD  Respiratory: Right sided basilar rales, no wheezing or rhonchi. Non-labored breathing.   Cardiovascular: +irregularly irregular; S1/S2; no gallops or murmurs auscultated  Gastrointestinal: Soft; mildly distended but non tender and w/o guarding. No masses.   Extremities: WWP; No LE Edema, bilateral bruising on LE       MEDICATIONS  (STANDING):  cyanocobalamin 500 MICROGram(s) Oral daily  famotidine    Tablet 20 milliGRAM(s) Oral daily  heparin  Injectable 5000 Unit(s) SubCutaneous every 12 hours  influenza   Vaccine 0.5 milliLiter(s) IntraMuscular once  levothyroxine 75 MICROGram(s) Oral daily  metoprolol     tartrate 50 milliGRAM(s) Oral two times a day  polyethylene glycol 3350 17 Gram(s) Oral daily  potassium chloride    Tablet ER 40 milliEquivalent(s) Oral once  simvastatin 10 milliGRAM(s) Oral at bedtime  sodium chloride 0.9%. 1000 milliLiter(s) (150 mL/Hr) IV Continuous <Continuous>  sodium chloride 5% Solution 1 Drop(s) Both EYES three times a day    MEDICATIONS  (PRN):  acetaminophen   Tablet. 650 milliGRAM(s) Oral every 6 hours PRN Mild Pain (1 - 3)  bisacodyl Suppository 10 milliGRAM(s) Rectal daily PRN Constipation      LABS                        7.4    8.8   )-----------( 264      ( 24 Nov 2017 08:02 )             23.8     11-24    133<L>  |  99  |  24<H>  ----------------------------<  120<H>  3.6   |  24  |  1.01    Ca    10.5      24 Nov 2017 08:02  Phos  2.2     11-23  Mg     1.9     11-24    TPro  8.2  /  Alb  2.8<L>  /  TBili  0.2  /  DBili  x   /  AST  28  /  ALT  32  /  AlkPhos  83  11-22    LIVER FUNCTIONS - ( 22 Nov 2017 16:57 )  Alb: 2.8 g/dL / Pro: 8.2 g/dL / ALK PHOS: 83 U/L / ALT: 32 U/L / AST: 28 U/L / GGT: x           PT/INR - ( 22 Nov 2017 16:57 )   PT: 11.4 sec;   INR: 1.03          PTT - ( 22 Nov 2017 16:57 )  PTT:30.1 sec      IMAGING/EKG/ETC    < from: CT Head w/ IV Cont (11.23.17 @ 16:18) >  1. No CT evidence of acute intracranial hemorrhage.  2. Chronic, severe microangiopathic disease, unchanged from recent study   of 11/14/2017, as described above. If there is concern for acute infarct,   consideration could be given to brain MR with diffusion imaging as this   is a more sensitive study for the detection of acute infarction.  3. Age-appropriate volume loss.    < end of copied text >

## 2017-11-24 NOTE — PROGRESS NOTE ADULT - ASSESSMENT
89 F former 60 PY smoker with a PMH significant for HTN, HLD, Breast CA, AAA repair, former smoker, GERD, anxiety/insomnia, recently admitted from 11/14-11/17 for PNA and hypercalcemia, who presents directly from her oncologist's office, Dr. Valdez, after being found to have hypercalcemia to 12.7 and WBC to 17, admitted to [address] [place] [address] for workup of underlying malignancy given high suspicion.

## 2017-11-25 LAB
ALBUMIN SERPL ELPH-MCNC: 2 G/DL — LOW (ref 3.3–5)
ALP SERPL-CCNC: 80 U/L — SIGNIFICANT CHANGE UP (ref 40–120)
ALT FLD-CCNC: 24 U/L — SIGNIFICANT CHANGE UP (ref 10–45)
ANION GAP SERPL CALC-SCNC: 11 MMOL/L — SIGNIFICANT CHANGE UP (ref 5–17)
AST SERPL-CCNC: 22 U/L — SIGNIFICANT CHANGE UP (ref 10–40)
BILIRUB SERPL-MCNC: 0.2 MG/DL — SIGNIFICANT CHANGE UP (ref 0.2–1.2)
BUN SERPL-MCNC: 21 MG/DL — SIGNIFICANT CHANGE UP (ref 7–23)
CALCIUM SERPL-MCNC: 9.8 MG/DL — SIGNIFICANT CHANGE UP (ref 8.4–10.5)
CHLORIDE SERPL-SCNC: 99 MMOL/L — SIGNIFICANT CHANGE UP (ref 96–108)
CO2 SERPL-SCNC: 23 MMOL/L — SIGNIFICANT CHANGE UP (ref 22–31)
CREAT SERPL-MCNC: 0.88 MG/DL — SIGNIFICANT CHANGE UP (ref 0.5–1.3)
GLUCOSE SERPL-MCNC: 105 MG/DL — HIGH (ref 70–99)
HCT VFR BLD CALC: 25.8 % — LOW (ref 34.5–45)
HGB BLD-MCNC: 8.1 G/DL — LOW (ref 11.5–15.5)
MAGNESIUM SERPL-MCNC: 1.8 MG/DL — SIGNIFICANT CHANGE UP (ref 1.6–2.6)
MCHC RBC-ENTMCNC: 28.9 PG — SIGNIFICANT CHANGE UP (ref 27–34)
MCHC RBC-ENTMCNC: 31.4 G/DL — LOW (ref 32–36)
MCV RBC AUTO: 92.1 FL — SIGNIFICANT CHANGE UP (ref 80–100)
PHOSPHATE SERPL-MCNC: 1.3 MG/DL — LOW (ref 2.5–4.5)
PLATELET # BLD AUTO: 272 K/UL — SIGNIFICANT CHANGE UP (ref 150–400)
POTASSIUM SERPL-MCNC: 3.9 MMOL/L — SIGNIFICANT CHANGE UP (ref 3.5–5.3)
POTASSIUM SERPL-SCNC: 3.9 MMOL/L — SIGNIFICANT CHANGE UP (ref 3.5–5.3)
PROT SERPL-MCNC: 6.9 G/DL — SIGNIFICANT CHANGE UP (ref 6–8.3)
RBC # BLD: 2.8 M/UL — LOW (ref 3.8–5.2)
RBC # FLD: 15.1 % — SIGNIFICANT CHANGE UP (ref 10.3–16.9)
SODIUM SERPL-SCNC: 133 MMOL/L — LOW (ref 135–145)
WBC # BLD: 11.4 K/UL — HIGH (ref 3.8–10.5)
WBC # FLD AUTO: 11.4 K/UL — HIGH (ref 3.8–10.5)

## 2017-11-25 PROCEDURE — 99233 SBSQ HOSP IP/OBS HIGH 50: CPT | Mod: GC

## 2017-11-25 RX ORDER — MAGNESIUM SULFATE 500 MG/ML
1 VIAL (ML) INJECTION ONCE
Qty: 0 | Refills: 0 | Status: COMPLETED | OUTPATIENT
Start: 2017-11-25 | End: 2017-11-25

## 2017-11-25 RX ORDER — POTASSIUM PHOSPHATE, MONOBASIC POTASSIUM PHOSPHATE, DIBASIC 236; 224 MG/ML; MG/ML
30 INJECTION, SOLUTION INTRAVENOUS ONCE
Qty: 0 | Refills: 0 | Status: COMPLETED | OUTPATIENT
Start: 2017-11-25 | End: 2017-11-25

## 2017-11-25 RX ORDER — POTASSIUM CHLORIDE 20 MEQ
20 PACKET (EA) ORAL ONCE
Qty: 0 | Refills: 0 | Status: DISCONTINUED | OUTPATIENT
Start: 2017-11-25 | End: 2017-11-25

## 2017-11-25 RX ADMIN — Medication 50 MILLIGRAM(S): at 07:14

## 2017-11-25 RX ADMIN — HEPARIN SODIUM 5000 UNIT(S): 5000 INJECTION INTRAVENOUS; SUBCUTANEOUS at 17:17

## 2017-11-25 RX ADMIN — Medication 1 DROP(S): at 07:13

## 2017-11-25 RX ADMIN — FAMOTIDINE 20 MILLIGRAM(S): 10 INJECTION INTRAVENOUS at 11:31

## 2017-11-25 RX ADMIN — HEPARIN SODIUM 5000 UNIT(S): 5000 INJECTION INTRAVENOUS; SUBCUTANEOUS at 07:13

## 2017-11-25 RX ADMIN — SIMVASTATIN 10 MILLIGRAM(S): 20 TABLET, FILM COATED ORAL at 21:24

## 2017-11-25 RX ADMIN — POLYETHYLENE GLYCOL 3350 17 GRAM(S): 17 POWDER, FOR SOLUTION ORAL at 17:17

## 2017-11-25 RX ADMIN — POTASSIUM PHOSPHATE, MONOBASIC POTASSIUM PHOSPHATE, DIBASIC 85 MILLIMOLE(S): 236; 224 INJECTION, SOLUTION INTRAVENOUS at 11:32

## 2017-11-25 RX ADMIN — Medication 100 GRAM(S): at 09:53

## 2017-11-25 RX ADMIN — Medication 50 MILLIGRAM(S): at 17:17

## 2017-11-25 RX ADMIN — POLYETHYLENE GLYCOL 3350 17 GRAM(S): 17 POWDER, FOR SOLUTION ORAL at 07:14

## 2017-11-25 RX ADMIN — Medication 1 DROP(S): at 21:24

## 2017-11-25 RX ADMIN — Medication 75 MICROGRAM(S): at 07:14

## 2017-11-25 RX ADMIN — Medication 1 DROP(S): at 13:46

## 2017-11-25 RX ADMIN — PREGABALIN 500 MICROGRAM(S): 225 CAPSULE ORAL at 11:31

## 2017-11-25 NOTE — PROGRESS NOTE ADULT - SUBJECTIVE AND OBJECTIVE BOX
sitting in bed eating. significant improvement in mental status.     VITALS  Vital Signs   Vital Signs Last 24 Hrs  T(C): 36.4 (25 Nov 2017 10:14), Max: 36.8 (25 Nov 2017 05:30)  T(F): 97.6 (25 Nov 2017 10:14), Max: 98.2 (25 Nov 2017 05:30)  HR: 72 (25 Nov 2017 10:14) (72 - 101)  BP: 129/68 (25 Nov 2017 10:14) (125/71 - 152/112)  BP(mean): --  RR: 15 (25 Nov 2017 10:14) (15 - 16)  SpO2: 96% (25 Nov 2017 10:14) (95% - 99%)    I&O's Summary    23 Nov 2017 07:01  -  24 Nov 2017 07:00  --------------------------------------------------------  IN: 1650 mL / OUT: 0 mL / NET: 1650 mL      PHYSICAL EXAM  General: Sleeping comfortably, awakens easily; NAD  Head: NC/AT; MMM  Eyes: anicteric sclera  Neck: Supple; no JVD  Respiratory: Right sided basilar rales, no wheezing or rhonchi. Non-labored breathing.   Cardiovascular: +irregularly irregular; S1/S2; no gallops or murmurs auscultated  Gastrointestinal: Soft; mildly distended but non tender and w/o guarding. No masses.   Extremities: WWP; No LE Edema, bilateral bruising on LE       MEDICATIONS  (STANDING):  cyanocobalamin 500 MICROGram(s) Oral daily  famotidine    Tablet 20 milliGRAM(s) Oral daily  heparin  Injectable 5000 Unit(s) SubCutaneous every 12 hours  influenza   Vaccine 0.5 milliLiter(s) IntraMuscular once  levothyroxine 75 MICROGram(s) Oral daily  metoprolol     tartrate 50 milliGRAM(s) Oral two times a day  polyethylene glycol 3350 17 Gram(s) Oral daily  potassium chloride    Tablet ER 40 milliEquivalent(s) Oral once  simvastatin 10 milliGRAM(s) Oral at bedtime  sodium chloride 0.9%. 1000 milliLiter(s) (150 mL/Hr) IV Continuous <Continuous>  sodium chloride 5% Solution 1 Drop(s) Both EYES three times a day    MEDICATIONS  (PRN):  acetaminophen   Tablet. 650 milliGRAM(s) Oral every 6 hours PRN Mild Pain (1 - 3)  bisacodyl Suppository 10 milliGRAM(s) Rectal daily PRN Constipation      LABS                        7.4    8.8   )-----------( 264      ( 24 Nov 2017 08:02 )             23.8     11-24    133<L>  |  99  |  24<H>  ----------------------------<  120<H>  3.6   |  24  |  1.01    Ca    10.5      24 Nov 2017 08:02  Phos  2.2     11-23  Mg     1.9     11-24    TPro  8.2  /  Alb  2.8<L>  /  TBili  0.2  /  DBili  x   /  AST  28  /  ALT  32  /  AlkPhos  83  11-22    LIVER FUNCTIONS - ( 22 Nov 2017 16:57 )  Alb: 2.8 g/dL / Pro: 8.2 g/dL / ALK PHOS: 83 U/L / ALT: 32 U/L / AST: 28 U/L / GGT: x           PT/INR - ( 22 Nov 2017 16:57 )   PT: 11.4 sec;   INR: 1.03          PTT - ( 22 Nov 2017 16:57 )  PTT:30.1 sec      IMAGING/EKG/ETC    < from: CT Head w/ IV Cont (11.23.17 @ 16:18) >  1. No CT evidence of acute intracranial hemorrhage.  2. Chronic, severe microangiopathic disease, unchanged from recent study   of 11/14/2017, as described above. If there is concern for acute infarct,   consideration could be given to brain MR with diffusion imaging as this   is a more sensitive study for the detection of acute infarction.  3. Age-appropriate volume loss.    < end of copied text >

## 2017-11-25 NOTE — PROGRESS NOTE ADULT - SUBJECTIVE AND OBJECTIVE BOX
· Subjective and Objective: 	  History of Present Illness: 	  The patient is a 89 year old woman with a PMH significant for HTN, HLD, Breast CA, AAA repair, former smoker, GERD, anxiety/insomnia, recently admitted from - for PNA and hypercalcemia, who presents directly after being found to have hypercalcemia to 12.7 and WBC to 17. The health aide reports that the patient was in rehab and went for consultation on her hypercalcemia and bone lesion found on her recent admission. The patient has had decreased appetite with low PO intake, unintentional weight loss, and fatigue prior to her last admission. Her aide notes that the patient is confused at times but notes her mental status has improved since her last admission. Patient endorses tiredness, constipation, myalgias, runny nose, and a non-productive cough which has improved since being discharged. She denies headache, dizziness, chest pain, SOB, palpitations, N/V/D, arthralgias, fevers, and chills.      Review of Systems:  Other Review of Systems: All other review of systems negative, except as noted in HPI	      Allergies and Intolerances:        Allergies:  	No Known Allergies:     Home Medications:   * Patient Currently Takes Medications as of 2017 14:01 documented in Structured Notes  · 	famotidine 20 mg oral tablet: 1 tab(s) orally once a day  · 	metoprolol tartrate 50 mg oral tablet: 1 tab(s) orally 2 times a day  · 	Synthroid 75 mcg (0.075 mg) oral tablet: 1 tab(s) orally once a day  · 	Vitamin B12 500 mcg oral tablet: 1 tab(s) orally once a day  · 	zolpidem 5 mg oral tablet: 1 tab(s) orally once a day (at bedtime), As Needed  · 	sodium chloride, hypertonic 5% ophthalmic solution: 1 application to each affected eye 3 times a day  · 	Colace 50 mg oral capsule: 1 cap(s) orally 2 times a day, As Needed for constipation  · 	simvastatin 10 mg oral tablet: 1 tab(s) orally once a day (at bedtime  .  Patient History:    Past Medical History:  Anxiety    Breast cancer in female    GERD (gastroesophageal reflux disease)    HTN (hypertension)    Hyperlipidemia.     Past Surgical History:  H/O lumpectomy    H/O:     History of AAA (abdominal aortic aneurysm) repair.     Family History:  No pertinent family history in first degree relatives.     Social History:  Social History (marital status, living situation, occupation, tobacco use, alcohol and drug use, and sexual history): Patient came from rehab.   Tobacco: Former smoker (~60pack year history per son)  EtOH: social drinker Recreational Drugs: denies	       Heart Failure:  Does this patient have a history of or has been diagnosed with heart failure? no.       Physical Exam:  Physical Exam: Constitutional: Frail, elderly lady, resting comfortably in bed   Head: NC/AT  Eyes: PERRL, EOMI, anicteric sclera  ENT: no nasal discharge; uvula midline, no oropharyngeal erythema or exudates; dry mucous membranes   Neck: supple; no JVD or thyromegaly  Respiratory: CTA B/L; no W/R/R, no retractions  Cardiac: +S1/S2; RRR; no M/R/G  Gastrointestinal: abdomen soft, NT/ND; no rebound or guarding; +BS  Back: spine midline, no bony tenderness or step-offs; no CVAT B/L  Extremities: WWP, no clubbing or cyanosis; no peripheral edema  Musculoskeletal: no joint swelling, tenderness or erythema  Vascular: 2+ radial DP pulses B/L  Dermatologic: skin is warm/dry/intact; bruising over b/l shins  Lymphatic: no submandibular or cervical LAD Neurologic: AAOx2 (person and date of birth); CNII-XII grossly intact; no focal deficits	       Labs and Results:  Labs, Radiology, Cardiology, and Other Results: .  LABS:                           8.1   11.4  )-----------( 272      ( 2017 07:41 )            25.8  133<L>  |  99  |  21 ----------------------------<  105<H> 3.9   |  23  |  0.88  Ca    9.8      2017 07:41 Phos  1.3     - Mg     1.8       TPro  6.9  /  Alb  2.0<L>  /  TBili  0.2  /  DBili  x   /  AST  22  /  ALT  24  /  AlkPhos  80          RADIOLOGY, EKG & ADDITIONAL TESTS: Reviewed.	    Assessment and Plan:    Assessment:  · Assessment		  89F with a PMH significant for HTN, HLD, Breast CA, AAA repair, former smoker, GERD, anxiety/insomnia, recently admitted from - for PNA and hypercalcemia, who presents directly from her oncologist's office, Dr. Valdez, after being found to have hypercalcemia to 12.7 and WBC to 17.     Problem/Plan - 1:  ·  Problem: Hypercalcemia.  Plan: Ca elevated to 12.3 on recent admission; found to have Ca of 12.7 during outpatient visit, was 8.5 in May 2017. Patient not currently in thiazides. Lytic lesion seen on 6th rib on prior CXR from , concerning in the setting of history of breast cancer and smoking history.  During last admission PTRH neg, intact PTH <2  -IV Hydration   -s/p Pamidronate 60mg IVPB - may require repeat dose  -Monitor BMP closely  -F/u vitamin D serum, PTH, ACE.      Problem/Plan - 2:  ·  Problem: Rib lesion.  Plan: Found to have right 6th rib lytic lesion on CXR from prior admission. Etiologies include breast cancer mets vs. MM vs. lung cancer given extensive smoking history. SPEP from last admission relatively unremarkable. Immunofixation from last admission was unremarkable.   -Plan for IR guided biopsy of lytic lesion     Problem/Plan - 3:  ·  Problem: GERD (gastroesophageal reflux disease).  Plan: -Famotidine 20mg q24h.      Problem/Plan - 4:  ·  Problem: Hypothyroidism.  Plan: -F/u TSH   -C/w levothyroxine 75mcg/daily.      Problem/Plan - 5:  ·  Problem: Leukocytosis.  Plan: WBC of 17 on outpatient labs; 12.8 on discharge on . Recently treated for CAP. Patient reports improvement in cough and denies fevers/chills.   -F/u CXR   -F/u UA   -F/u CBC.      Problem/Plan - 6:  Problem: Anemia  - labs c/w mixed iron deficiency and anemia of chronic disease  - transfuse PRBCs if Hb < 7 g/dl     Problem/Plan - 7:  ·  Problem: HTN (hypertension).  Plan: -Metoprolol 50mg BID.      Problem/Plan - 8:  ·  Problem: Constipation.  Plan: -Miralax daily  -Monitor BM.      Problem/Plan - 9:  ·  Problem: Nutrition, metabolism, and development symptoms.  Plan: F: IVF at 100cc/h  E: Replete K > 4 and Mg >2  N: mechanical soft and thin liquids w/ ensure enlive BID.      Problem/Plan - 10:  Problem: Prophylactic measure. Plan; DVT ppx: SQH     DNR/DNI

## 2017-11-25 NOTE — PROGRESS NOTE ADULT - PROBLEM SELECTOR PLAN 1
Ca elevated to 12.3 on recent admission; found to have Ca of 12.7 during outpatient visit, was 8.5 in May 2017. Lytic lesion seen on 6th rib on prior CXR from 11/6, concerning in the setting of history of breast cancer and 60 PY smoking history.  During last admission PTRH neg, intact PTH <2. S/p Pamindronate 11/22.  -sCa continues to downtrend, 10.5 today (corrected ~11.3)  -Given high suspicion of malignancy, IR consulted for bx of rib lytic lesion  -Will continue IVF @ 150 cc/hr in setting of hypercalcemia, monitor fluid status.   -s/p Pamidronate 60mg IVPB x1  -Continue to monitor sCa with daily BMP

## 2017-11-25 NOTE — PROGRESS NOTE ADULT - ASSESSMENT
89 F former 60 PY smoker with a PMH significant for HTN, HLD, Breast CA, AAA repair, former smoker, GERD, anxiety/insomnia, recently admitted from 11/14-11/17 for PNA and hypercalcemia, who presents directly from her oncologist's office, Dr. Valdez, after being found to have hypercalcemia to 12.7 and WBC to 17, admitted to [address] [place] [address] for workup of underlying malignancy given high suspicion.

## 2017-11-26 LAB
ANION GAP SERPL CALC-SCNC: 13 MMOL/L — SIGNIFICANT CHANGE UP (ref 5–17)
BUN SERPL-MCNC: 17 MG/DL — SIGNIFICANT CHANGE UP (ref 7–23)
CALCIUM SERPL-MCNC: 8.7 MG/DL — SIGNIFICANT CHANGE UP (ref 8.4–10.5)
CHLORIDE SERPL-SCNC: 98 MMOL/L — SIGNIFICANT CHANGE UP (ref 96–108)
CO2 SERPL-SCNC: 23 MMOL/L — SIGNIFICANT CHANGE UP (ref 22–31)
CREAT SERPL-MCNC: 0.94 MG/DL — SIGNIFICANT CHANGE UP (ref 0.5–1.3)
GLUCOSE SERPL-MCNC: 109 MG/DL — HIGH (ref 70–99)
HCT VFR BLD CALC: 23.9 % — LOW (ref 34.5–45)
HGB BLD-MCNC: 7.6 G/DL — LOW (ref 11.5–15.5)
MAGNESIUM SERPL-MCNC: 2.1 MG/DL — SIGNIFICANT CHANGE UP (ref 1.6–2.6)
MCHC RBC-ENTMCNC: 28.7 PG — SIGNIFICANT CHANGE UP (ref 27–34)
MCHC RBC-ENTMCNC: 31.8 G/DL — LOW (ref 32–36)
MCV RBC AUTO: 90.2 FL — SIGNIFICANT CHANGE UP (ref 80–100)
PHOSPHATE SERPL-MCNC: 2.8 MG/DL — SIGNIFICANT CHANGE UP (ref 2.5–4.5)
PLATELET # BLD AUTO: 258 K/UL — SIGNIFICANT CHANGE UP (ref 150–400)
POTASSIUM SERPL-MCNC: 4.3 MMOL/L — SIGNIFICANT CHANGE UP (ref 3.5–5.3)
POTASSIUM SERPL-SCNC: 4.3 MMOL/L — SIGNIFICANT CHANGE UP (ref 3.5–5.3)
RBC # BLD: 2.65 M/UL — LOW (ref 3.8–5.2)
RBC # FLD: 15.1 % — SIGNIFICANT CHANGE UP (ref 10.3–16.9)
SODIUM SERPL-SCNC: 134 MMOL/L — LOW (ref 135–145)
WBC # BLD: 10 K/UL — SIGNIFICANT CHANGE UP (ref 3.8–10.5)
WBC # FLD AUTO: 10 K/UL — SIGNIFICANT CHANGE UP (ref 3.8–10.5)

## 2017-11-26 PROCEDURE — 99233 SBSQ HOSP IP/OBS HIGH 50: CPT | Mod: GC

## 2017-11-26 RX ORDER — OXYCODONE HYDROCHLORIDE 5 MG/1
5 TABLET ORAL EVERY 6 HOURS
Qty: 0 | Refills: 0 | Status: DISCONTINUED | OUTPATIENT
Start: 2017-11-26 | End: 2017-11-29

## 2017-11-26 RX ADMIN — POLYETHYLENE GLYCOL 3350 17 GRAM(S): 17 POWDER, FOR SOLUTION ORAL at 17:53

## 2017-11-26 RX ADMIN — Medication 75 MICROGRAM(S): at 06:27

## 2017-11-26 RX ADMIN — POLYETHYLENE GLYCOL 3350 17 GRAM(S): 17 POWDER, FOR SOLUTION ORAL at 06:27

## 2017-11-26 RX ADMIN — SIMVASTATIN 10 MILLIGRAM(S): 20 TABLET, FILM COATED ORAL at 22:13

## 2017-11-26 RX ADMIN — Medication 50 MILLIGRAM(S): at 06:27

## 2017-11-26 RX ADMIN — OXYCODONE HYDROCHLORIDE 5 MILLIGRAM(S): 5 TABLET ORAL at 17:08

## 2017-11-26 RX ADMIN — PREGABALIN 500 MICROGRAM(S): 225 CAPSULE ORAL at 11:51

## 2017-11-26 RX ADMIN — HEPARIN SODIUM 5000 UNIT(S): 5000 INJECTION INTRAVENOUS; SUBCUTANEOUS at 06:26

## 2017-11-26 RX ADMIN — Medication 1 DROP(S): at 14:21

## 2017-11-26 RX ADMIN — Medication 1 DROP(S): at 06:27

## 2017-11-26 RX ADMIN — OXYCODONE HYDROCHLORIDE 5 MILLIGRAM(S): 5 TABLET ORAL at 15:52

## 2017-11-26 RX ADMIN — Medication 50 MILLIGRAM(S): at 17:53

## 2017-11-26 RX ADMIN — HEPARIN SODIUM 5000 UNIT(S): 5000 INJECTION INTRAVENOUS; SUBCUTANEOUS at 17:53

## 2017-11-26 RX ADMIN — OXYCODONE HYDROCHLORIDE 5 MILLIGRAM(S): 5 TABLET ORAL at 23:00

## 2017-11-26 RX ADMIN — OXYCODONE HYDROCHLORIDE 5 MILLIGRAM(S): 5 TABLET ORAL at 22:12

## 2017-11-26 RX ADMIN — FAMOTIDINE 20 MILLIGRAM(S): 10 INJECTION INTRAVENOUS at 11:51

## 2017-11-26 RX ADMIN — Medication 1 DROP(S): at 22:13

## 2017-11-26 NOTE — PROGRESS NOTE ADULT - PROBLEM SELECTOR PLAN 10
F: no IVF  E: Replete K > 4 and Mg >2  N: mechanical soft and thin liquids w/ ensure enlive BID  DVT ppx: SQH 5000 u q12h   DNR/DNI   Dispo: 7W

## 2017-11-26 NOTE — PROGRESS NOTE ADULT - SUBJECTIVE AND OBJECTIVE BOX
OVERNIGHT EVENTS: No acute overnight events.     SUBJECTIVE / INTERVAL HPI: Patient seen and examined at bedside. Patient notes only feeling tired this morning. Patient's HHA (at bedside) notes that she had a small BM yesterday. Patient denies fevers, chills, NS, headaches, dizziness, chest pain, shortness of breath. Remainder of ROS negative.    VITAL SIGNS:  Vital Signs Last 24 Hrs  T(C): 36.8 (26 Nov 2017 09:58), Max: 36.9 (25 Nov 2017 16:55)  T(F): 98.2 (26 Nov 2017 09:58), Max: 98.4 (25 Nov 2017 16:55)  HR: 81 (26 Nov 2017 09:58) (81 - 93)  BP: 119/72 (26 Nov 2017 09:58) (119/72 - 173/87)  BP(mean): --  RR: 18 (26 Nov 2017 09:58) (16 - 18)  SpO2: 95% (26 Nov 2017 09:58) (94% - 98%)    PHYSICAL EXAM:      Constitutional: Frail, elderly female, resting comfortably in bed, dry, HHA at bedside  	HEENT: NC/AT, PERRL, EOMI, anicteric sclera, uvula midline, no oropharyngeal erythema or exudates; MMM  	Neck: supple    Lymph: no submandibular or cervical LAD  	Respiratory: R sided basilar rales; no wheezing/rhonchi, no retractions  	Cardiac: +S1/S2; RRR; no M/R/G appreciated on auscultation  	Gastrointestinal: abdomen soft, NT/ND; no rebound or guarding; +BS  	Extremities: WWP, no clubbing or cyanosis; no peripheral edema, large ecchymoses overlying shins b/l from recent falls  	Musculoskeletal: no joint swelling, tenderness or erythema  	Vascular: 2+ radial DP pulses B/L, cool extremities  	Dermatologic: skin is warm/dry/intact; scattered bruising over b/l shins     Neurologic: AAOx2 (not oriented to time); CNII-XII grossly intact; no focal deficits    MEDICATIONS:  MEDICATIONS  (STANDING):  cyanocobalamin 500 MICROGram(s) Oral daily  famotidine    Tablet 20 milliGRAM(s) Oral daily  heparin  Injectable 5000 Unit(s) SubCutaneous every 12 hours  levothyroxine 75 MICROGram(s) Oral daily  metoprolol     tartrate 50 milliGRAM(s) Oral two times a day  polyethylene glycol 3350 17 Gram(s) Oral two times a day  simvastatin 10 milliGRAM(s) Oral at bedtime  sodium chloride 0.9%. 1000 milliLiter(s) (70 mL/Hr) IV Continuous <Continuous>  sodium chloride 5% Solution 1 Drop(s) Both EYES three times a day    MEDICATIONS  (PRN):  acetaminophen   Tablet. 650 milliGRAM(s) Oral every 6 hours PRN Mild Pain (1 - 3)      ALLERGIES:  Allergies    No Known Allergies    Intolerances        LABS:                        7.6    10.0  )-----------( 258      ( 26 Nov 2017 08:14 )             23.9     11-26    134<L>  |  98  |  17  ----------------------------<  109<H>  4.3   |  23  |  0.94    Ca    8.7      26 Nov 2017 08:14  Phos  2.8     11-26  Mg     2.1     11-26    TPro  6.9  /  Alb  2.0<L>  /  TBili  0.2  /  DBili  x   /  AST  22  /  ALT  24  /  AlkPhos  80  11-25        CAPILLARY BLOOD GLUCOSE          RADIOLOGY & ADDITIONAL TESTS: Reviewed.    ASSESSMENT:    PLAN: OVERNIGHT EVENTS: No acute overnight events.     SUBJECTIVE / INTERVAL HPI: Patient seen and examined at bedside. Patient notes only feeling tired this morning. Patient's HHA (at bedside) notes that she had a small BM yesterday. Patient denies fevers, chills, NS, headaches, dizziness, chest pain, shortness of breath. Remainder of ROS negative.    VITAL SIGNS:  Vital Signs Last 24 Hrs  T(C): 36.8 (26 Nov 2017 09:58), Max: 36.9 (25 Nov 2017 16:55)  T(F): 98.2 (26 Nov 2017 09:58), Max: 98.4 (25 Nov 2017 16:55)  HR: 81 (26 Nov 2017 09:58) (81 - 93)  BP: 119/72 (26 Nov 2017 09:58) (119/72 - 173/87)  BP(mean): --  RR: 18 (26 Nov 2017 09:58) (16 - 18)  SpO2: 95% (26 Nov 2017 09:58) (94% - 98%)    PHYSICAL EXAM:      Constitutional: Frail, elderly female, resting comfortably in bed, dry, HHA at bedside  	HEENT: NC/AT, PERRL, EOMI, anicteric sclera, uvula midline, no oropharyngeal erythema or exudates; MMM  	Neck: supple    Lymph: no submandibular or cervical LAD  	Respiratory: R sided basilar rales; no wheezing/rhonchi, no retractions  	Cardiac: +S1/S2; irregular rhythm; no M/R/G appreciated on auscultation  	Gastrointestinal: abdomen soft, NT/ND; no rebound or guarding; +BS  	Extremities: WWP, no clubbing or cyanosis; no peripheral edema, large ecchymoses overlying shins b/l from recent falls  	Musculoskeletal: no joint swelling, tenderness or erythema  	Vascular: 2+ radial DP pulses B/L, cool extremities  	Dermatologic: skin is warm/dry/intact; scattered bruising over b/l shins     Neurologic: AAOx2 (not oriented to time); CNII-XII grossly intact; no focal deficits    MEDICATIONS:  MEDICATIONS  (STANDING):  cyanocobalamin 500 MICROGram(s) Oral daily  famotidine    Tablet 20 milliGRAM(s) Oral daily  heparin  Injectable 5000 Unit(s) SubCutaneous every 12 hours  levothyroxine 75 MICROGram(s) Oral daily  metoprolol     tartrate 50 milliGRAM(s) Oral two times a day  polyethylene glycol 3350 17 Gram(s) Oral two times a day  simvastatin 10 milliGRAM(s) Oral at bedtime  sodium chloride 0.9%. 1000 milliLiter(s) (70 mL/Hr) IV Continuous <Continuous>  sodium chloride 5% Solution 1 Drop(s) Both EYES three times a day    MEDICATIONS  (PRN):  acetaminophen   Tablet. 650 milliGRAM(s) Oral every 6 hours PRN Mild Pain (1 - 3)      ALLERGIES:  Allergies    No Known Allergies    Intolerances        LABS:                        7.6    10.0  )-----------( 258      ( 26 Nov 2017 08:14 )             23.9     11-26    134<L>  |  98  |  17  ----------------------------<  109<H>  4.3   |  23  |  0.94    Ca    8.7      26 Nov 2017 08:14  Phos  2.8     11-26  Mg     2.1     11-26    TPro  6.9  /  Alb  2.0<L>  /  TBili  0.2  /  DBili  x   /  AST  22  /  ALT  24  /  AlkPhos  80  11-25        CAPILLARY BLOOD GLUCOSE          RADIOLOGY & ADDITIONAL TESTS: Reviewed.    ASSESSMENT:    PLAN:

## 2017-11-26 NOTE — PROGRESS NOTE ADULT - SUBJECTIVE AND OBJECTIVE BOX
· Subjective and Objective: 	  History of Present Illness: 	  The patient is a 89 year old woman with a PMH significant for HTN, HLD, Breast CA, AAA repair, former smoker, GERD, anxiety/insomnia, recently admitted from - for PNA and hypercalcemia, who presents directly after being found to have hypercalcemia to 12.7 and WBC to 17. The health aide reports that the patient was in rehab and went for consultation on her hypercalcemia and bone lesion found on her recent admission. The patient has had decreased appetite with low PO intake, unintentional weight loss, and fatigue prior to her last admission. Her aide notes that the patient is confused at times but notes her mental status has improved since her last admission. Patient endorses tiredness, constipation, myalgias, runny nose, and a non-productive cough which has improved since being discharged. She denies headache, dizziness, chest pain, SOB, palpitations, N/V/D, arthralgias, fevers, and chills.      Review of Systems:  Other Review of Systems: All other review of systems negative, except as noted in HPI	      Allergies and Intolerances:        Allergies:  	No Known Allergies:     Home Medications:   * Patient Currently Takes Medications as of 2017 14:01 documented in Structured Notes  · 	famotidine 20 mg oral tablet: 1 tab(s) orally once a day  · 	metoprolol tartrate 50 mg oral tablet: 1 tab(s) orally 2 times a day  · 	Synthroid 75 mcg (0.075 mg) oral tablet: 1 tab(s) orally once a day  · 	Vitamin B12 500 mcg oral tablet: 1 tab(s) orally once a day  · 	zolpidem 5 mg oral tablet: 1 tab(s) orally once a day (at bedtime), As Needed  · 	sodium chloride, hypertonic 5% ophthalmic solution: 1 application to each affected eye 3 times a day  · 	Colace 50 mg oral capsule: 1 cap(s) orally 2 times a day, As Needed for constipation  · 	simvastatin 10 mg oral tablet: 1 tab(s) orally once a day (at bedtime  .  Patient History:    Past Medical History:  Anxiety    Breast cancer in female    GERD (gastroesophageal reflux disease)    HTN (hypertension)    Hyperlipidemia.     Past Surgical History:  H/O lumpectomy    H/O:     History of AAA (abdominal aortic aneurysm) repair.     Family History:  No pertinent family history in first degree relatives.     Social History:  Social History (marital status, living situation, occupation, tobacco use, alcohol and drug use, and sexual history): Patient came from rehab.   Tobacco: Former smoker (~60pack year history per son)  EtOH: social drinker Recreational Drugs: denies	       Heart Failure:  Does this patient have a history of or has been diagnosed with heart failure? no.       Physical Exam:  Physical Exam: Constitutional: Frail, elderly lady, resting comfortably in bed   Head: NC/AT  Eyes: PERRL, EOMI, anicteric sclera  ENT: no nasal discharge; uvula midline, no oropharyngeal erythema or exudates; dry mucous membranes   Neck: supple; no JVD or thyromegaly  Respiratory: CTA B/L; no W/R/R, no retractions  Cardiac: +S1/S2; RRR; no M/R/G  Gastrointestinal: abdomen soft, NT/ND; no rebound or guarding; +BS  Back: spine midline, no bony tenderness or step-offs; no CVAT B/L  Extremities: WWP, no clubbing or cyanosis; no peripheral edema  Musculoskeletal: no joint swelling, tenderness or erythema  Vascular: 2+ radial DP pulses B/L  Dermatologic: skin is warm/dry/intact; bruising over b/l shins  Lymphatic: no submandibular or cervical LAD Neurologic: AAOx2 (person and date of birth); CNII-XII grossly intact; no focal deficits	       Labs and Results:  Labs, Radiology, Cardiology, and Other Results: . LABS:                       7.6   10.0  )-----------( 258      ( 2017 08:14 )            23.9  134<L>  |  98  |  17 ----------------------------<  109<H> 4.3   |  23  |  0.94  Ca    8.7      2017 08:14 Phos  2.8     - Mg     2.1       TPro  6.9  /  Alb  2.0<L>  /  TBili  0.2  /  DBili  x   /  AST  22  /  ALT  24  /  AlkPhos  80         RADIOLOGY, EKG & ADDITIONAL TESTS: Reviewed.	    Assessment and Plan:    Assessment:  · Assessment		  89F with a PMH significant for HTN, HLD, Breast CA, AAA repair, former smoker, GERD, anxiety/insomnia, recently admitted from - for PNA and hypercalcemia, who presents directly from her oncologist's office, Dr. Valdez, after being found to have hypercalcemia to 12.7 and WBC to 17.     Problem/Plan - 1:  ·  Problem: Hypercalcemia.  Plan: Ca elevated to 12.3 on recent admission; found to have Ca of 12.7 during outpatient visit, was 8.5 in May 2017. Patient not currently in thiazides. Lytic lesion seen on 6th rib on prior CXR from , concerning in the setting of history of breast cancer and smoking history.  During last admission PTRH neg, intact PTH <2  -IV Hydration   -s/p Pamidronate 60mg IVPB - may require repeat dose  -Monitor BMP closely     Problem/Plan - 2:  ·  Problem: Rib lesion.  Plan: Found to have right 6th rib lytic lesion on CXR from prior admission. Etiologies include breast cancer mets vs. MM vs. lung cancer given extensive smoking history. SPEP from last admission relatively unremarkable. Immunofixation from last admission was unremarkable.   -Await IR guided biopsy of lytic lesion     Problem/Plan - 3:  ·  Problem: GERD (gastroesophageal reflux disease).  Plan: -Famotidine 20mg q24h.      Problem/Plan - 4:  ·  Problem: Hypothyroidism.  Plan: -F/u TSH   -C/w levothyroxine 75mcg/daily.      Problem/Plan - 5:  ·  Problem: Leukocytosis.  Plan: WBC of 17 on outpatient labs; 12.8 on discharge on . Recently treated for CAP. Patient reports improvement in cough and denies fevers/chills.   -F/u CXR   -F/u UA   -F/u CBC.      Problem/Plan - 6:  Problem: Anemia  - labs c/w mixed iron deficiency and anemia of chronic disease  - transfuse PRBCs if Hb < 7 g/dl     Problem/Plan - 7:  ·  Problem: HTN (hypertension).  Plan: -Metoprolol 50mg BID.      Problem/Plan - 8:  ·  Problem: Constipation.  Plan: -Miralax daily  -Monitor BM.      Problem/Plan - 9:  ·  Problem: Nutrition, metabolism, and development symptoms.  Plan: F: IVF at 100cc/h  E: Replete K > 4 and Mg >2  N: mechanical soft and thin liquids w/ ensure enlive BID.      Problem/Plan - 10:  Problem: Prophylactic measure. Plan; DVT ppx: SQH     DNR/DNI

## 2017-11-26 NOTE — PROGRESS NOTE ADULT - PROBLEM SELECTOR PLAN 9
Patients last BM 11/22 after receiving enema in nursing home. HHA reports small BM yesterday 11/26  -Miralax daily  -Dulcolax suppository daily PRN  -Monitor BM in setting of elevated Ca

## 2017-11-26 NOTE — PROGRESS NOTE ADULT - PROBLEM SELECTOR PLAN 3
Patient with recent change over the last couple of weeks in mental status per HHA . Patient AA0x2 on admission (unchanged today), per HHA AA0x3 normally. Likely multifactorial. Possibly 2/2 to hypercalcemia vs malignancy vs infectious etiology vs dehydration. Head CT negative for ICH or recent infarction. Now improving.   -Unlikely infectious in etiology, WBC downtrending, afebrile, VSS, will continue to monitor.  -Ca downtrending, will continue to monitor with daily BMPs

## 2017-11-26 NOTE — PROGRESS NOTE ADULT - PROBLEM SELECTOR PLAN 1
Ca elevated to 12.3 on recent admission; found to have Ca of 12.7 during outpatient visit, was 8.5 in May 2017. Patient not currently in thiazides. Lytic lesion seen on 6th rib on prior CXR from 11/6, concerning in the setting of history of breast cancer and 60 PY smoking history.  During last admission PTRH neg, intact PTH <2. S/p Pamindronate 11/22.  -sCa continues to downtrend, 8.7 today  -Given high suspicion of malignancy, plan for bx of rib lytic lesion with IR  -s/p Pamidronate 60mg IVPB x1  -Continue to monitor sCa with daily BMP  -F/u vitamin D serum, PTH, ACE Improving. Ca elevated to 12.7 during outpatient visit, was 8.5 in May 2017. Patient not currently in thiazides. Lytic lesion seen on 6th rib on prior CXR from 11/6, concerning in the setting of history of breast cancer and 60 PY smoking history.  During last admission PTRH neg, intact PTH <2. S/p Pamindronate 11/22.  -sCa continues to downtrend, 8.7 today  -Plan for bx of rib lytic lesion with IR tomorrow  -s/p Pamidronate 60mg IVPB x1  -Continue to monitor sCa with daily BMP  -F/u vitamin D serum, PTH, ACE

## 2017-11-26 NOTE — PROGRESS NOTE ADULT - PROBLEM SELECTOR PLAN 4
Resolved. WBC of 17 on outpatient labs; downtrending. WBC 10 this AM  -Will continue to monitor with daily CBCs. No abxs warranted at this time.   -CXR notable for LLL consolidation w/ associated unchanged small layering L parapneumonic effusion.   -F/u UA   -c/w daily CBCs

## 2017-11-26 NOTE — PROGRESS NOTE ADULT - ASSESSMENT
89 F former 60 PY smoker with a PMH significant for HTN, HLD, Breast CA, AAA repair, former smoker, GERD, anxiety/insomnia, recently admitted from 11/14-11/17 for PNA and hypercalcemia, who presents directly from her oncologist's office, Dr. Valdez with hypercalcemia and leukocytosis, now improving, currently undergoing workup of underlying malignancy.

## 2017-11-26 NOTE — PROGRESS NOTE ADULT - PROBLEM SELECTOR PLAN 2
Found to have right 6th rib lytic lesion on CXR from prior admission. Etiologies include breast cancer mets vs. MM vs. lung cancer given extensive smoking history. SPEP from last admission relatively unremarkable. Immunofixation from last admission was unremarkable.   -Repeat CXR this admission notes unchanged R lateral 6th rib lesion. Plan for IR guided biopsy of lytic lesion  -F/u CA 15-3, CA 29-27, CEA, UPEP  -Heme-onc following; appreciate recs

## 2017-11-27 DIAGNOSIS — E87.1 HYPO-OSMOLALITY AND HYPONATREMIA: ICD-10-CM

## 2017-11-27 LAB
ACE SERPL-CCNC: 25 U/L — SIGNIFICANT CHANGE UP (ref 14–82)
ALBUMIN SERPL ELPH-MCNC: 2.3 G/DL — LOW (ref 3.3–5)
ALP SERPL-CCNC: 92 U/L — SIGNIFICANT CHANGE UP (ref 40–120)
ALT FLD-CCNC: 23 U/L — SIGNIFICANT CHANGE UP (ref 10–45)
ANION GAP SERPL CALC-SCNC: 10 MMOL/L — SIGNIFICANT CHANGE UP (ref 5–17)
ANION GAP SERPL CALC-SCNC: 16 MMOL/L — SIGNIFICANT CHANGE UP (ref 5–17)
APPEARANCE UR: (no result)
APTT BLD: 29.4 SEC — SIGNIFICANT CHANGE UP (ref 27.5–37.4)
AST SERPL-CCNC: 24 U/L — SIGNIFICANT CHANGE UP (ref 10–40)
BASOPHILS NFR BLD AUTO: 0.2 % — SIGNIFICANT CHANGE UP (ref 0–2)
BILIRUB SERPL-MCNC: 0.2 MG/DL — SIGNIFICANT CHANGE UP (ref 0.2–1.2)
BILIRUB UR-MCNC: NEGATIVE — SIGNIFICANT CHANGE UP
BUN SERPL-MCNC: 21 MG/DL — SIGNIFICANT CHANGE UP (ref 7–23)
BUN SERPL-MCNC: 21 MG/DL — SIGNIFICANT CHANGE UP (ref 7–23)
CALCIUM SERPL-MCNC: 8.6 MG/DL — SIGNIFICANT CHANGE UP (ref 8.4–10.5)
CALCIUM SERPL-MCNC: 8.6 MG/DL — SIGNIFICANT CHANGE UP (ref 8.4–10.5)
CHLORIDE SERPL-SCNC: 96 MMOL/L — SIGNIFICANT CHANGE UP (ref 96–108)
CHLORIDE SERPL-SCNC: 96 MMOL/L — SIGNIFICANT CHANGE UP (ref 96–108)
CO2 SERPL-SCNC: 20 MMOL/L — LOW (ref 22–31)
CO2 SERPL-SCNC: 25 MMOL/L — SIGNIFICANT CHANGE UP (ref 22–31)
COLOR SPEC: YELLOW — SIGNIFICANT CHANGE UP
CREAT SERPL-MCNC: 1.06 MG/DL — SIGNIFICANT CHANGE UP (ref 0.5–1.3)
CREAT SERPL-MCNC: 1.1 MG/DL — SIGNIFICANT CHANGE UP (ref 0.5–1.3)
DIFF PNL FLD: NEGATIVE — SIGNIFICANT CHANGE UP
EOSINOPHIL NFR BLD AUTO: 3 % — SIGNIFICANT CHANGE UP (ref 0–6)
GLUCOSE SERPL-MCNC: 113 MG/DL — HIGH (ref 70–99)
GLUCOSE SERPL-MCNC: 135 MG/DL — HIGH (ref 70–99)
GLUCOSE UR QL: NEGATIVE — SIGNIFICANT CHANGE UP
HCT VFR BLD CALC: 25.6 % — LOW (ref 34.5–45)
HCT VFR BLD CALC: 26.5 % — LOW (ref 34.5–45)
HGB BLD-MCNC: 8 G/DL — LOW (ref 11.5–15.5)
HGB BLD-MCNC: 8.4 G/DL — LOW (ref 11.5–15.5)
INR BLD: 1.09 — SIGNIFICANT CHANGE UP (ref 0.88–1.16)
KETONES UR-MCNC: NEGATIVE — SIGNIFICANT CHANGE UP
LACTATE SERPL-SCNC: 1.8 MMOL/L — SIGNIFICANT CHANGE UP (ref 0.5–2)
LEUKOCYTE ESTERASE UR-ACNC: NEGATIVE — SIGNIFICANT CHANGE UP
LYMPHOCYTES # BLD AUTO: 6.1 % — LOW (ref 13–44)
MAGNESIUM SERPL-MCNC: 2 MG/DL — SIGNIFICANT CHANGE UP (ref 1.6–2.6)
MAGNESIUM SERPL-MCNC: 2 MG/DL — SIGNIFICANT CHANGE UP (ref 1.6–2.6)
MCHC RBC-ENTMCNC: 28.7 PG — SIGNIFICANT CHANGE UP (ref 27–34)
MCHC RBC-ENTMCNC: 28.9 PG — SIGNIFICANT CHANGE UP (ref 27–34)
MCHC RBC-ENTMCNC: 31.3 G/DL — LOW (ref 32–36)
MCHC RBC-ENTMCNC: 31.7 G/DL — LOW (ref 32–36)
MCV RBC AUTO: 90.4 FL — SIGNIFICANT CHANGE UP (ref 80–100)
MCV RBC AUTO: 92.4 FL — SIGNIFICANT CHANGE UP (ref 80–100)
MONOCYTES NFR BLD AUTO: 7 % — SIGNIFICANT CHANGE UP (ref 2–14)
NEUTROPHILS NFR BLD AUTO: 83.7 % — HIGH (ref 43–77)
NITRITE UR-MCNC: NEGATIVE — SIGNIFICANT CHANGE UP
OSMOLALITY SERPL: 286 MOSM/KG — SIGNIFICANT CHANGE UP (ref 280–301)
PH UR: 6.5 — SIGNIFICANT CHANGE UP (ref 5–8)
PHOSPHATE SERPL-MCNC: 2.5 MG/DL — SIGNIFICANT CHANGE UP (ref 2.5–4.5)
PLATELET # BLD AUTO: 201 K/UL — SIGNIFICANT CHANGE UP (ref 150–400)
PLATELET # BLD AUTO: 250 K/UL — SIGNIFICANT CHANGE UP (ref 150–400)
POTASSIUM SERPL-MCNC: 4.5 MMOL/L — SIGNIFICANT CHANGE UP (ref 3.5–5.3)
POTASSIUM SERPL-MCNC: 4.5 MMOL/L — SIGNIFICANT CHANGE UP (ref 3.5–5.3)
POTASSIUM SERPL-SCNC: 4.5 MMOL/L — SIGNIFICANT CHANGE UP (ref 3.5–5.3)
POTASSIUM SERPL-SCNC: 4.5 MMOL/L — SIGNIFICANT CHANGE UP (ref 3.5–5.3)
PROT SERPL-MCNC: 7.5 G/DL — SIGNIFICANT CHANGE UP (ref 6–8.3)
PROT UR-MCNC: 30 MG/DL
PROTHROM AB SERPL-ACNC: 12.1 SEC — SIGNIFICANT CHANGE UP (ref 9.8–12.7)
RBC # BLD: 2.77 M/UL — LOW (ref 3.8–5.2)
RBC # BLD: 2.93 M/UL — LOW (ref 3.8–5.2)
RBC # FLD: 14.8 % — SIGNIFICANT CHANGE UP (ref 10.3–16.9)
RBC # FLD: 15.2 % — SIGNIFICANT CHANGE UP (ref 10.3–16.9)
SODIUM SERPL-SCNC: 131 MMOL/L — LOW (ref 135–145)
SODIUM SERPL-SCNC: 132 MMOL/L — LOW (ref 135–145)
SP GR SPEC: 1.01 — SIGNIFICANT CHANGE UP (ref 1–1.03)
UROBILINOGEN FLD QL: 0.2 E.U./DL — SIGNIFICANT CHANGE UP
WBC # BLD: 12.5 K/UL — HIGH (ref 3.8–10.5)
WBC # BLD: 12.9 K/UL — HIGH (ref 3.8–10.5)
WBC # FLD AUTO: 12.5 K/UL — HIGH (ref 3.8–10.5)
WBC # FLD AUTO: 12.9 K/UL — HIGH (ref 3.8–10.5)

## 2017-11-27 PROCEDURE — 99233 SBSQ HOSP IP/OBS HIGH 50: CPT

## 2017-11-27 PROCEDURE — 71250 CT THORAX DX C-: CPT | Mod: 26

## 2017-11-27 PROCEDURE — 71010: CPT | Mod: 26

## 2017-11-27 RX ORDER — ERGOCALCIFEROL 1.25 MG/1
50000 CAPSULE ORAL
Qty: 0 | Refills: 0 | Status: DISCONTINUED | OUTPATIENT
Start: 2017-11-27 | End: 2017-11-29

## 2017-11-27 RX ORDER — IPRATROPIUM/ALBUTEROL SULFATE 18-103MCG
3 AEROSOL WITH ADAPTER (GRAM) INHALATION EVERY 6 HOURS
Qty: 0 | Refills: 0 | Status: DISCONTINUED | OUTPATIENT
Start: 2017-11-27 | End: 2017-11-29

## 2017-11-27 RX ORDER — SODIUM CHLORIDE 9 MG/ML
500 INJECTION INTRAMUSCULAR; INTRAVENOUS; SUBCUTANEOUS ONCE
Qty: 0 | Refills: 0 | Status: COMPLETED | OUTPATIENT
Start: 2017-11-27 | End: 2017-11-27

## 2017-11-27 RX ORDER — PIPERACILLIN AND TAZOBACTAM 4; .5 G/20ML; G/20ML
2.25 INJECTION, POWDER, LYOPHILIZED, FOR SOLUTION INTRAVENOUS EVERY 6 HOURS
Qty: 0 | Refills: 0 | Status: DISCONTINUED | OUTPATIENT
Start: 2017-11-27 | End: 2017-11-29

## 2017-11-27 RX ORDER — ACETAMINOPHEN 500 MG
650 TABLET ORAL ONCE
Qty: 0 | Refills: 0 | Status: COMPLETED | OUTPATIENT
Start: 2017-11-27 | End: 2017-11-27

## 2017-11-27 RX ORDER — IPRATROPIUM/ALBUTEROL SULFATE 18-103MCG
3 AEROSOL WITH ADAPTER (GRAM) INHALATION ONCE
Qty: 0 | Refills: 0 | Status: COMPLETED | OUTPATIENT
Start: 2017-11-27 | End: 2017-11-27

## 2017-11-27 RX ORDER — VANCOMYCIN HCL 1 G
750 VIAL (EA) INTRAVENOUS EVERY 24 HOURS
Qty: 0 | Refills: 0 | Status: DISCONTINUED | OUTPATIENT
Start: 2017-11-27 | End: 2017-11-29

## 2017-11-27 RX ADMIN — FAMOTIDINE 20 MILLIGRAM(S): 10 INJECTION INTRAVENOUS at 12:01

## 2017-11-27 RX ADMIN — POLYETHYLENE GLYCOL 3350 17 GRAM(S): 17 POWDER, FOR SOLUTION ORAL at 19:21

## 2017-11-27 RX ADMIN — ERGOCALCIFEROL 50000 UNIT(S): 1.25 CAPSULE ORAL at 12:23

## 2017-11-27 RX ADMIN — Medication 1 DROP(S): at 06:18

## 2017-11-27 RX ADMIN — POLYETHYLENE GLYCOL 3350 17 GRAM(S): 17 POWDER, FOR SOLUTION ORAL at 06:18

## 2017-11-27 RX ADMIN — Medication 50 MILLIGRAM(S): at 06:18

## 2017-11-27 RX ADMIN — OXYCODONE HYDROCHLORIDE 5 MILLIGRAM(S): 5 TABLET ORAL at 19:12

## 2017-11-27 RX ADMIN — Medication 650 MILLIGRAM(S): at 19:49

## 2017-11-27 RX ADMIN — Medication 50 MILLIGRAM(S): at 19:21

## 2017-11-27 RX ADMIN — Medication 3 MILLILITER(S): at 20:34

## 2017-11-27 RX ADMIN — OXYCODONE HYDROCHLORIDE 5 MILLIGRAM(S): 5 TABLET ORAL at 20:21

## 2017-11-27 RX ADMIN — HEPARIN SODIUM 5000 UNIT(S): 5000 INJECTION INTRAVENOUS; SUBCUTANEOUS at 06:18

## 2017-11-27 RX ADMIN — Medication 250 MILLIGRAM(S): at 21:20

## 2017-11-27 RX ADMIN — Medication 1 DROP(S): at 14:42

## 2017-11-27 RX ADMIN — SODIUM CHLORIDE 1000 MILLILITER(S): 9 INJECTION INTRAMUSCULAR; INTRAVENOUS; SUBCUTANEOUS at 23:00

## 2017-11-27 RX ADMIN — PREGABALIN 500 MICROGRAM(S): 225 CAPSULE ORAL at 12:02

## 2017-11-27 RX ADMIN — Medication 75 MICROGRAM(S): at 06:18

## 2017-11-27 RX ADMIN — PIPERACILLIN AND TAZOBACTAM 25 GRAM(S): 4; .5 INJECTION, POWDER, LYOPHILIZED, FOR SOLUTION INTRAVENOUS at 22:05

## 2017-11-27 RX ADMIN — SODIUM CHLORIDE 1500 MILLILITER(S): 9 INJECTION INTRAMUSCULAR; INTRAVENOUS; SUBCUTANEOUS at 21:20

## 2017-11-27 RX ADMIN — HEPARIN SODIUM 5000 UNIT(S): 5000 INJECTION INTRAVENOUS; SUBCUTANEOUS at 19:21

## 2017-11-27 NOTE — DIETITIAN INITIAL EVALUATION ADULT. - ADHERENCE
pt prefers soft foods, drinks Ensure BID. As per last SLP eval on last admit: rec. mec soft diet with thin liquids./good

## 2017-11-27 NOTE — CHART NOTE - NSCHARTNOTEFT_GEN_A_CORE
PGY3 Event Note PGY3 Event Note    Informed by RN that patient arrived from CT shivering and HR 120s. Obtained full set of VS which revealed T 101.5 (rectal), /85, , RR 18, O2 95% on RA. Examined immediately at bedside. HHA at bedside. Patient was being given tylenol in apple sauce but seemed to be coughing and not tolerating after 2 bites. Switched tylenol to suppository. Patient reporting that she feels colds otherwise denies any complaints. States that she knew she is at a hospital but didn't know which one, knew that her home health aide was with her. Otherwise denied fever, headache, nausea, lightheadedness, blurry vision, dyspnea, chest pain, palpitations, abdominal pain, dysuria, or weakness. HHA at bedside reported that patient was previously on mechanical soft diet but not having any episodes of coughing during this admission. Earlier today patient was oriented and pleasant, afebrile, and pending IR biopsy.     On exam, Tmax 101.5 rectal, -130s, -184/79-85, RR 22, O2 90% on RA --> improved to 97% on 2L NC. Generally appeared somewhat confused, eyes closed  but responding to verbal stimuli and following some commands, appeared somewhat tachpyneic but without accessory muscle use. AOx2. Had dry MM with minimal secretions, no JVD, CV - tachycardic but regular, no new murmurs; Resp - tachypneic to 22 without accessory muscle use but wheezing throughout and mild crackles at b/l base (L>R), Abdomen soft non tender, no suprapubic tenderness, no CVA tenderness; extremities warm and well perfused, no edema, pulses intact. Neuro wise PERRL, unable to fully assess CN as not fully cooperative with exam, not dysarthric, no facial droop, moving all extremities but not fully participatory in full strength testing, sensation intact throughout, downgoing plantar reflexes bilaterally.     Obtained STAT labs including CBC, Lactate, BMP, T&S, PT/PTT, Blood Cultures, U/A, CXR.   Given duonebs and placed on 2L NC.  EKG revealed sinus tachycardic with prolonged UT, no ischemic changes.   Orderd for 500cc bolus x 2.     Labs:                         8.0    12.5  )-----------( 201      ( 27 Nov 2017 20:42 )             25.6     Lactate, Blood: 1.8 mmoL/L (11-27-17 @ 20:14)    Comprehensive Metabolic Panel (11.27.17 @ 20:42)    Sodium, Serum: 132 mmol/L    Potassium, Serum: 4.5 mmol/L    Chloride, Serum: 96 mmol/L    Carbon Dioxide, Serum: 20 mmol/L    Anion Gap, Serum: 16 mmol/L    Blood Urea Nitrogen, Serum: 21 mg/dL    Creatinine, Serum: 1.06 mg/dL    Glucose, Serum: 135 mg/dL    Calcium, Total Serum: 8.6 mg/dL    Protein Total, Serum: 7.5 g/dL    Albumin, Serum: 2.3 g/dL    Bilirubin Total, Serum: 0.2 mg/dL    Alkaline Phosphatase, Serum: 92 U/L    Aspartate Aminotransferase (AST/SGOT): 24 U/L    Alanine Aminotransferase (ALT/SGPT): 23 U/L    eGFR if Non : 46: The units for eGFR are ml/min/1.73m2 (normalized body surface area). The          89F with history of HTN, Breast CA, recent hospitalization for PNA initially admitted for hypercalcemia and leukocytosis, improving, but now developed new fever, most likely secondary to HAP vs Aspiration PNA.      - CXR revealed possible left lower consolidation vs retrocardiac opacity, worse from initial CXR on admission; also with mild pulmonary congestion  - WBC unchanged at 12.5 with PMN 83.4, LA 1.8   - start broad spectrum given recent hospitalization with vancomycin and zosyn   - received 500cc NS x 1 - will give total 30cc/kg of fluid with goal UOP 25-30cc/hr given weight, with frequent lung checks     - U/A negative, no other clear source of infection  - f/u blood cultures and attempt to obtain sputum culture  - maintain NPO and follow up S&S eval in AM  - confirmed DNR/DNI status with daughter at bedside  - discussed with attending and night team to monitor closely - if evidence of worsening then consult ICU for closer monitoring         Addendum at 21:01 - patient re-examined at bedside - patient still somewhat confused but more alert and interactive. Still denies any complaints. Recheck of VS revealed T 99.3, , /71 (recheck 123/62), RR 18, O2 98% on 2LNC. Wheezing improved and less tachypneic, still with minimal secretions. Warm and well perfused throughout.   Received 500cc x1, will start next bolus and check UOP thereafter.

## 2017-11-27 NOTE — PROGRESS NOTE ADULT - PROBLEM SELECTOR PLAN 2
Found to have right 6th rib lytic lesion on CXR from prior admission. Etiologies include breast cancer mets vs. MM vs. lung cancer given extensive smoking history. SPEP from last admission relatively unremarkable. Immunofixation from last admission was unremarkable.   -Repeat CXR this admission notes unchanged R lateral 6th rib lesion. Plan for IR guided biopsy of lytic lesion  -F/u CA 15-3, CA 29-27, CEA, UPEP  -Heme-onc following; appreciate recs Found to have right 6th rib lytic lesion on CXR from prior admission. Etiologies include breast cancer mets vs. MM vs. lung cancer given extensive smoking history. SPEP from last admission relatively unremarkable. Immunofixation from last admission was unremarkable.   -Repeat CXR this admission notes unchanged R lateral 6th rib lesion. Plan for IR guided biopsy of lytic lesion  -CA 15-3 (28.1-WNL), CA 29-27 (30-WNL), CEA (8.6 - elevated)  -f/u UPEP  -Heme-onc following; appreciate recs    #Vit D Deficiency  -Will give 50,000 IU Vit D today, weekly x 8 weeks

## 2017-11-27 NOTE — PROGRESS NOTE ADULT - PROBLEM SELECTOR PLAN 5
-Famotidine 20mg q24h Patient with hyponatremia on baseline, Na 131 today. Of unclear etiology.   -hypovolemic on examination, dry appearing. Possibly 2/2 to poor PO intake.  -f/u urine studies

## 2017-11-27 NOTE — PROGRESS NOTE ADULT - PROBLEM SELECTOR PLAN 9
Patients last BM 11/22 after receiving enema in nursing home. HHA reports small BM yesterday 11/26  -Miralax daily  -Dulcolax suppository daily PRN  -Monitor BM in setting of elevated Ca -Patient with hx of HTN on home med, Metoprolol 50mg BID  -c/w home med

## 2017-11-27 NOTE — CHART NOTE - NSCHARTNOTEFT_GEN_A_CORE
Pt seen and examined with daughter (HCP) and HHA at bedside; VS improved s/p Nebs, APAP, IVF bolus and initiation of abx. Pt w/ suspected LLL PNA, possible aspiration. pt awake, alert, but delrious per daughter; pt w/ dry MM along w/ decreased breath sounds / crackles at Left base. Continue with IVFs, broad spectrum abx, supplemental O2 via NC. Will monitor overnight. Informed daughter of possible ICU consult if clinically deteriorates.  Confirmed DNR/DNI status with daughter.

## 2017-11-27 NOTE — DIETITIAN INITIAL EVALUATION ADULT. - OTHER INFO
89 F former 60 PY smoker with a PMH significant for HTN, HLD, Breast CA, AAA repair, former smoker, GERD, anxiety/insomnia, recently admitted from 11/14-11/17 for PNA and hypercalcemia, currently undergoing workup of underlying malignancy. Pt denies any n/v/d/c, + BM per HHA. As per HHA and daughter, pt with poor PO intake at home; drinks Ensure BID. Pt reports difficulty chewing. As per last SLP ken constantino soft diet.  Denies any pain at this time. NKFA. As per HHA, most recent wt was 87 lb at her PMD office ~1 month ago, current wt: 92 lbs.

## 2017-11-27 NOTE — PROVIDER CONTACT NOTE (CHANGE IN STATUS NOTIFICATION) - ACTION/TREATMENT ORDERED:
Standing metoprolol, tylenol supp, duoneb admin. Blood cultures sent. EKG, and chest x-ray ordered. Sepsis protocol in place.

## 2017-11-27 NOTE — PROVIDER CONTACT NOTE (CHANGE IN STATUS NOTIFICATION) - SITUATION
89yo F awaiting work up for lytic legion on ribn. Patient complaining of severe back pain, and nausea.

## 2017-11-27 NOTE — PROGRESS NOTE ADULT - SUBJECTIVE AND OBJECTIVE BOX
OVERNIGHT EVENTS: No acute overnight events. NPO after MN in preparation for possible bx of lytic lesion 11/27. Afebrile, HD stable.     SUBJECTIVE / INTERVAL HPI: Patient seen and examined at bedside. Patient notes feeling thirsty and still weak this morning. Patient still endorses poor appetite and pain along the R rib cage. She is unable to quantify on a pain scale this morning however she does note the pain is severe. Patient's HHA present at bedside, noting patient's last BM 2 days ago. Patient denies fevers, chills, NS, chest pain, shortness of breath, nausea/vomiting. Remainder of ROS negative.     VITAL SIGNS:  Vital Signs Last 24 Hrs  T(C): 37.2 (27 Nov 2017 09:31), Max: 37.2 (27 Nov 2017 09:31)  T(F): 99 (27 Nov 2017 09:31), Max: 99 (27 Nov 2017 09:31)  HR: 89 (27 Nov 2017 09:31) (85 - 102)  BP: 128/79 (27 Nov 2017 09:31) (128/79 - 186/89)  BP(mean): --  RR: 16 (27 Nov 2017 09:31) (15 - 18)  SpO2: 93% (27 Nov 2017 09:31) (93% - 98%)    PHYSICAL EXAM:    General: WDWN  HEENT: NC/AT; PERRL, anicteric sclera; MMM  Neck: supple  Cardiovascular: +S1/S2; RRR  Respiratory: CTA B/L; no W/R/R  Gastrointestinal: soft, NT/ND; +BSx4  Extremities: WWP; no edema, clubbing or cyanosis  Vascular: 2+ radial, DP/PT pulses B/L  Neurological: AAOx3; no focal deficits    MEDICATIONS:  MEDICATIONS  (STANDING):  cyanocobalamin 500 MICROGram(s) Oral daily  famotidine    Tablet 20 milliGRAM(s) Oral daily  heparin  Injectable 5000 Unit(s) SubCutaneous every 12 hours  levothyroxine 75 MICROGram(s) Oral daily  metoprolol     tartrate 50 milliGRAM(s) Oral two times a day  polyethylene glycol 3350 17 Gram(s) Oral two times a day  simvastatin 10 milliGRAM(s) Oral at bedtime  sodium chloride 0.9%. 1000 milliLiter(s) (70 mL/Hr) IV Continuous <Continuous>  sodium chloride 5% Solution 1 Drop(s) Both EYES three times a day    MEDICATIONS  (PRN):  acetaminophen   Tablet. 650 milliGRAM(s) Oral every 6 hours PRN Mild Pain (1 - 3)  oxyCODONE    IR 5 milliGRAM(s) Oral every 6 hours PRN Severe Pain (7 - 10)      ALLERGIES:  Allergies    No Known Allergies    Intolerances        LABS:                        8.4    12.9  )-----------( 250      ( 27 Nov 2017 08:19 )             26.5     11-27    131<L>  |  96  |  21  ----------------------------<  113<H>  4.5   |  25  |  1.10    Ca    8.6      27 Nov 2017 08:19  Phos  2.5     11-27  Mg     2.0     11-27          CAPILLARY BLOOD GLUCOSE          RADIOLOGY & ADDITIONAL TESTS: Reviewed.    ASSESSMENT:    PLAN: OVERNIGHT EVENTS: No acute overnight events. Afebrile, HD stable.     SUBJECTIVE / INTERVAL HPI: Patient seen and examined at bedside. Patient notes feeling thirsty and still weak this morning. Patient still endorses poor appetite and pain along the R rib cage. She is unable to quantify on a pain scale this morning however she does note the pain is severe. Patient's HHA present at bedside, noting patient's last BM 2 days ago. Patient denies fevers, chills, NS, chest pain, shortness of breath, nausea/vomiting. Remainder of ROS negative.     VITAL SIGNS:  Vital Signs Last 24 Hrs  T(C): 37.2 (27 Nov 2017 09:31), Max: 37.2 (27 Nov 2017 09:31)  T(F): 99 (27 Nov 2017 09:31), Max: 99 (27 Nov 2017 09:31)  HR: 89 (27 Nov 2017 09:31) (85 - 102)  BP: 128/79 (27 Nov 2017 09:31) (128/79 - 186/89)  BP(mean): --  RR: 16 (27 Nov 2017 09:31) (15 - 18)  SpO2: 93% (27 Nov 2017 09:31) (93% - 98%)    PHYSICAL EXAM:      Constitutional: Frail, elderly female, resting comfortably in bed, dry, HHA at bedside  	HEENT: NC/AT, PERRL, EOMI, anicteric sclera, uvula midline, no oropharyngeal erythema or exudates; dry MM  	Neck: supple    Lymph: no submandibular or cervical LAD  	Respiratory: R sided basilar rales; no wheezing/rhonchi, no retractions  	Cardiac: +S1/S2; irregular rhythm; no M/R/G appreciated on auscultation  	Gastrointestinal: abdomen soft, NT/ND; no rebound or guarding; +BS  	Extremities: WWP, no clubbing or cyanosis; no peripheral edema, large ecchymoses overlying shins b/l from recent falls  	Musculoskeletal: no joint swelling, tenderness or erythema  	Vascular: 2+ radial DP pulses B/L, cool extremities  	Dermatologic: skin is warm/dry/intact; scattered bruising over b/l shins     Neurologic: AAOx2 (not oriented to time); CNII-XII grossly intact; no focal deficits    MEDICATIONS:  MEDICATIONS  (STANDING):  cyanocobalamin 500 MICROGram(s) Oral daily  famotidine    Tablet 20 milliGRAM(s) Oral daily  heparin  Injectable 5000 Unit(s) SubCutaneous every 12 hours  levothyroxine 75 MICROGram(s) Oral daily  metoprolol     tartrate 50 milliGRAM(s) Oral two times a day  polyethylene glycol 3350 17 Gram(s) Oral two times a day  simvastatin 10 milliGRAM(s) Oral at bedtime  sodium chloride 0.9%. 1000 milliLiter(s) (70 mL/Hr) IV Continuous <Continuous>  sodium chloride 5% Solution 1 Drop(s) Both EYES three times a day    MEDICATIONS  (PRN):  acetaminophen   Tablet. 650 milliGRAM(s) Oral every 6 hours PRN Mild Pain (1 - 3)  oxyCODONE    IR 5 milliGRAM(s) Oral every 6 hours PRN Severe Pain (7 - 10)      ALLERGIES:  Allergies    No Known Allergies    Intolerances        LABS:                        8.4    12.9  )-----------( 250      ( 27 Nov 2017 08:19 )             26.5     11-27    131<L>  |  96  |  21  ----------------------------<  113<H>  4.5   |  25  |  1.10    Ca    8.6      27 Nov 2017 08:19  Phos  2.5     11-27  Mg     2.0     11-27          CAPILLARY BLOOD GLUCOSE          RADIOLOGY & ADDITIONAL TESTS: Reviewed.    ASSESSMENT:    PLAN:

## 2017-11-27 NOTE — DIETITIAN INITIAL EVALUATION ADULT. - PROBLEM SELECTOR PLAN 9
F: IVF at 100cc/h  E: Replete K > 4 and Mg >2  N: mechanical soft and thin liquids w/ ensure enlive BID

## 2017-11-27 NOTE — PROGRESS NOTE ADULT - PROBLEM SELECTOR PLAN 1
Improving. Ca elevated to 12.7 during outpatient visit, was 8.5 in May 2017. Patient not currently in thiazides. Lytic lesion seen on 6th rib on prior CXR from 11/6, concerning in the setting of history of breast cancer and 60 PY smoking history.  During last admission PTRH neg, intact PTH <2. S/p Pamindronate 11/22.  -sCa continues to downtrend, 8.7 today  -Plan for bx of rib lytic lesion with IR tomorrow  -s/p Pamidronate 60mg IVPB x1  -Continue to monitor sCa with daily BMP  -F/u vitamin D serum, PTH, ACE Improving. Ca elevated to 12.7 during outpatient visit, was 8.5 in May 2017.  Lytic lesion seen on 6th rib on prior CXR from 11/6, concerning for malignancy.  During last admission PTRH neg, intact PTH <2. S/p Pamindronate 11/22.  -sCa continues to downtrend, 8.6 today  -CT Chest w/o contrast today to better assess lytic lesion and other additional lesions  -Bx postponed to 11/28 until CT chest results. Plan for bx of rib lytic lesion with IR tomorrow, NPO after MN  -s/p Pamidronate 60mg IVPB x1  -Continue to monitor sCa with daily BMP  -F/u vitamin D serum, PTH, ACE  -Last BM 2 days ago, c/w current bowel regimen

## 2017-11-27 NOTE — DIETITIAN INITIAL EVALUATION ADULT. - ENERGY NEEDS
Height: 56" Weight: 91lbs, IBW 92lbs+/-10%, %IBW 98%, BMI - 20.69  ABW used to calculate energy needs due to pt's current body weight within % IBW   Nutrient needs based on [facility] standards of care for maintenance repletion in older adults.

## 2017-11-27 NOTE — PROGRESS NOTE ADULT - PROBLEM SELECTOR PLAN 6
Patient with history of hypothyroidism for which she takes levothyroxine 75 mcg/day. Well controlled, TSH -4.212 (WNL)  -C/w home med -Famotidine 20mg q24h

## 2017-11-28 ENCOUNTER — TRANSCRIPTION ENCOUNTER (OUTPATIENT)
Age: 82
End: 2017-11-28

## 2017-11-28 DIAGNOSIS — R65.10 SYSTEMIC INFLAMMATORY RESPONSE SYNDROME (SIRS) OF NON-INFECTIOUS ORIGIN WITHOUT ACUTE ORGAN DYSFUNCTION: ICD-10-CM

## 2017-11-28 LAB
24R-OH-CALCIDIOL SERPL-MCNC: 34.4 NG/ML — SIGNIFICANT CHANGE UP (ref 30–80)
ANION GAP SERPL CALC-SCNC: 12 MMOL/L — SIGNIFICANT CHANGE UP (ref 5–17)
BUN SERPL-MCNC: 21 MG/DL — SIGNIFICANT CHANGE UP (ref 7–23)
CALCIUM SERPL-MCNC: 8.1 MG/DL — LOW (ref 8.4–10.5)
CHLORIDE SERPL-SCNC: 99 MMOL/L — SIGNIFICANT CHANGE UP (ref 96–108)
CO2 SERPL-SCNC: 23 MMOL/L — SIGNIFICANT CHANGE UP (ref 22–31)
CREAT SERPL-MCNC: 1.11 MG/DL — SIGNIFICANT CHANGE UP (ref 0.5–1.3)
GLUCOSE SERPL-MCNC: 129 MG/DL — HIGH (ref 70–99)
HCT VFR BLD CALC: 24.7 % — LOW (ref 34.5–45)
HGB BLD-MCNC: 7.7 G/DL — LOW (ref 11.5–15.5)
MAGNESIUM SERPL-MCNC: 2 MG/DL — SIGNIFICANT CHANGE UP (ref 1.6–2.6)
MCHC RBC-ENTMCNC: 29.2 PG — SIGNIFICANT CHANGE UP (ref 27–34)
MCHC RBC-ENTMCNC: 31.2 G/DL — LOW (ref 32–36)
MCV RBC AUTO: 93.6 FL — SIGNIFICANT CHANGE UP (ref 80–100)
PLATELET # BLD AUTO: 258 K/UL — SIGNIFICANT CHANGE UP (ref 150–400)
POTASSIUM SERPL-MCNC: 4.2 MMOL/L — SIGNIFICANT CHANGE UP (ref 3.5–5.3)
POTASSIUM SERPL-SCNC: 4.2 MMOL/L — SIGNIFICANT CHANGE UP (ref 3.5–5.3)
RBC # BLD: 2.64 M/UL — LOW (ref 3.8–5.2)
RBC # FLD: 14.8 % — SIGNIFICANT CHANGE UP (ref 10.3–16.9)
SODIUM SERPL-SCNC: 134 MMOL/L — LOW (ref 135–145)
WBC # BLD: 11.6 K/UL — HIGH (ref 3.8–10.5)
WBC # FLD AUTO: 11.6 K/UL — HIGH (ref 3.8–10.5)

## 2017-11-28 PROCEDURE — 99233 SBSQ HOSP IP/OBS HIGH 50: CPT

## 2017-11-28 RX ORDER — SODIUM CHLORIDE 9 MG/ML
1000 INJECTION INTRAMUSCULAR; INTRAVENOUS; SUBCUTANEOUS
Qty: 0 | Refills: 0 | Status: DISCONTINUED | OUTPATIENT
Start: 2017-11-28 | End: 2017-11-29

## 2017-11-28 RX ORDER — LACTULOSE 10 G/15ML
20 SOLUTION ORAL DAILY
Qty: 0 | Refills: 0 | Status: DISCONTINUED | OUTPATIENT
Start: 2017-11-28 | End: 2017-11-29

## 2017-11-28 RX ORDER — SODIUM CHLORIDE 9 MG/ML
500 INJECTION INTRAMUSCULAR; INTRAVENOUS; SUBCUTANEOUS ONCE
Qty: 0 | Refills: 0 | Status: COMPLETED | OUTPATIENT
Start: 2017-11-28 | End: 2017-11-28

## 2017-11-28 RX ORDER — LANOLIN ALCOHOL/MO/W.PET/CERES
3 CREAM (GRAM) TOPICAL AT BEDTIME
Qty: 0 | Refills: 0 | Status: DISCONTINUED | OUTPATIENT
Start: 2017-11-28 | End: 2017-11-29

## 2017-11-28 RX ADMIN — Medication 250 MILLIGRAM(S): at 21:30

## 2017-11-28 RX ADMIN — Medication 3 MILLILITER(S): at 04:28

## 2017-11-28 RX ADMIN — PREGABALIN 500 MICROGRAM(S): 225 CAPSULE ORAL at 16:24

## 2017-11-28 RX ADMIN — POLYETHYLENE GLYCOL 3350 17 GRAM(S): 17 POWDER, FOR SOLUTION ORAL at 17:31

## 2017-11-28 RX ADMIN — Medication 50 MILLIGRAM(S): at 06:38

## 2017-11-28 RX ADMIN — PIPERACILLIN AND TAZOBACTAM 25 GRAM(S): 4; .5 INJECTION, POWDER, LYOPHILIZED, FOR SOLUTION INTRAVENOUS at 04:28

## 2017-11-28 RX ADMIN — POLYETHYLENE GLYCOL 3350 17 GRAM(S): 17 POWDER, FOR SOLUTION ORAL at 06:38

## 2017-11-28 RX ADMIN — Medication 650 MILLIGRAM(S): at 16:01

## 2017-11-28 RX ADMIN — Medication 75 MICROGRAM(S): at 06:38

## 2017-11-28 RX ADMIN — Medication 3 MILLILITER(S): at 09:48

## 2017-11-28 RX ADMIN — Medication 1 DROP(S): at 09:48

## 2017-11-28 RX ADMIN — SODIUM CHLORIDE 70 MILLILITER(S): 9 INJECTION INTRAMUSCULAR; INTRAVENOUS; SUBCUTANEOUS at 02:42

## 2017-11-28 RX ADMIN — Medication 650 MILLIGRAM(S): at 15:22

## 2017-11-28 RX ADMIN — SIMVASTATIN 10 MILLIGRAM(S): 20 TABLET, FILM COATED ORAL at 21:32

## 2017-11-28 RX ADMIN — FAMOTIDINE 20 MILLIGRAM(S): 10 INJECTION INTRAVENOUS at 16:24

## 2017-11-28 RX ADMIN — Medication 1 DROP(S): at 15:22

## 2017-11-28 RX ADMIN — SODIUM CHLORIDE 70 MILLILITER(S): 9 INJECTION INTRAMUSCULAR; INTRAVENOUS; SUBCUTANEOUS at 15:22

## 2017-11-28 RX ADMIN — PIPERACILLIN AND TAZOBACTAM 25 GRAM(S): 4; .5 INJECTION, POWDER, LYOPHILIZED, FOR SOLUTION INTRAVENOUS at 11:56

## 2017-11-28 RX ADMIN — LACTULOSE 20 GRAM(S): 10 SOLUTION ORAL at 15:22

## 2017-11-28 RX ADMIN — Medication 3 MILLILITER(S): at 21:29

## 2017-11-28 RX ADMIN — SODIUM CHLORIDE 1000 MILLILITER(S): 9 INJECTION INTRAMUSCULAR; INTRAVENOUS; SUBCUTANEOUS at 17:45

## 2017-11-28 RX ADMIN — Medication 3 MILLIGRAM(S): at 23:06

## 2017-11-28 RX ADMIN — Medication 10 MILLIGRAM(S): at 21:30

## 2017-11-28 RX ADMIN — Medication 3 MILLILITER(S): at 15:22

## 2017-11-28 RX ADMIN — PIPERACILLIN AND TAZOBACTAM 200 GRAM(S): 4; .5 INJECTION, POWDER, LYOPHILIZED, FOR SOLUTION INTRAVENOUS at 17:31

## 2017-11-28 RX ADMIN — Medication 1 DROP(S): at 21:30

## 2017-11-28 RX ADMIN — HEPARIN SODIUM 5000 UNIT(S): 5000 INJECTION INTRAVENOUS; SUBCUTANEOUS at 06:38

## 2017-11-28 RX ADMIN — PIPERACILLIN AND TAZOBACTAM 200 GRAM(S): 4; .5 INJECTION, POWDER, LYOPHILIZED, FOR SOLUTION INTRAVENOUS at 23:05

## 2017-11-28 RX ADMIN — HEPARIN SODIUM 5000 UNIT(S): 5000 INJECTION INTRAVENOUS; SUBCUTANEOUS at 17:38

## 2017-11-28 NOTE — SWALLOW BEDSIDE ASSESSMENT ADULT - NS SPL SWALLOW CLINIC TRIAL FT
Hyolaryngeal excursion appears WNL and swallow trigger is brisk & timely to palpation & cervical auscultation.

## 2017-11-28 NOTE — PROGRESS NOTE ADULT - PROBLEM SELECTOR PLAN 9
-Patient with hx of HTN on home med, Metoprolol 50mg BID  -c/w home med -Patient with hx of HTN on home med, Metoprolol 50mg BID  -Patient hypertensive to 180s last night in setting of 101.5F fever (rectal). Afebrile this AM with BP WNL. Continue to monitor BP.  -c/w home med

## 2017-11-28 NOTE — PROGRESS NOTE ADULT - SUBJECTIVE AND OBJECTIVE BOX
· Subjective and Objective: 	  History of Present Illness: 	  The patient is a 89 year old woman with a PMH significant for HTN, HLD, Breast CA, AAA repair, former smoker, GERD, anxiety/insomnia, recently admitted from - for PNA and hypercalcemia, who presents directly after being found to have hypercalcemia to 12.7 and WBC to 17. The health aide reports that the patient was in rehab and went for consultation on her hypercalcemia and bone lesion found on her recent admission. The patient has had decreased appetite with low PO intake, unintentional weight loss, and fatigue prior to her last admission. Her aide notes that the patient is confused at times but notes her mental status has improved since her last admission. Patient endorses tiredness, constipation, myalgias, runny nose, and a non-productive cough which has improved since being discharged. She denies headache, dizziness, chest pain, SOB, palpitations, N/V/D, arthralgias, fevers, and chills.      Review of Systems:  Other Review of Systems: All other review of systems negative, except as noted in HPI	      Allergies and Intolerances:        Allergies:  	No Known Allergies:     Home Medications:   * Patient Currently Takes Medications as of 2017 14:01 documented in Structured Notes  · 	famotidine 20 mg oral tablet: 1 tab(s) orally once a day  · 	metoprolol tartrate 50 mg oral tablet: 1 tab(s) orally 2 times a day  · 	Synthroid 75 mcg (0.075 mg) oral tablet: 1 tab(s) orally once a day  · 	Vitamin B12 500 mcg oral tablet: 1 tab(s) orally once a day  · 	zolpidem 5 mg oral tablet: 1 tab(s) orally once a day (at bedtime), As Needed  · 	sodium chloride, hypertonic 5% ophthalmic solution: 1 application to each affected eye 3 times a day  · 	Colace 50 mg oral capsule: 1 cap(s) orally 2 times a day, As Needed for constipation  · 	simvastatin 10 mg oral tablet: 1 tab(s) orally once a day (at bedtime  .  Patient History:    Past Medical History:  Anxiety    Breast cancer in female    GERD (gastroesophageal reflux disease)    HTN (hypertension)    Hyperlipidemia.     Past Surgical History:  H/O lumpectomy    H/O:     History of AAA (abdominal aortic aneurysm) repair.     Family History:  No pertinent family history in first degree relatives.     Social History:  Social History (marital status, living situation, occupation, tobacco use, alcohol and drug use, and sexual history): Patient came from rehab.   Tobacco: Former smoker (~60pack year history per son)  EtOH: social drinker Recreational Drugs: denies	       Heart Failure:  Does this patient have a history of or has been diagnosed with heart failure? no.       Physical Exam:  Physical Exam: Constitutional: Frail, elderly lady, resting comfortably in bed   Head: NC/AT  Eyes: PERRL, EOMI, anicteric sclera  ENT: no nasal discharge; uvula midline, no oropharyngeal erythema or exudates; dry mucous membranes   Neck: supple; no JVD or thyromegaly  Respiratory: CTA B/L; no W/R/R, no retractions  Cardiac: +S1/S2; RRR; no M/R/G  Gastrointestinal: abdomen soft, NT/ND; no rebound or guarding; +BS  Back: spine midline, no bony tenderness or step-offs; no CVAT B/L  Extremities: WWP, no clubbing or cyanosis; no peripheral edema  Musculoskeletal: no joint swelling, tenderness or erythema  Vascular: 2+ radial DP pulses B/L  Dermatologic: skin is warm/dry/intact; bruising over b/l shins  Lymphatic: no submandibular or cervical LAD Neurologic: AAOx2 (person and date of birth); CNII-XII grossly intact; no focal deficits	       Labs and Results:  Labs, Radiology, Cardiology, and Other Results: . LABS:                       7.7   11.6  )-----------( 258      ( 2017 07:34 )            24.7   134<L>  |  99  |  21 ----------------------------<  129<H> 4.2   |  23  |  1.11  Ca    8.1<L>      2017 07:34 Phos  2.5      Mg     2.0       TPro  7.5  /  Alb  2.3<L>  /  TBili  0.2  /  DBili  x   /  AST  24  /  ALT  23  /  AlkPhos  92         RADIOLOGY, EKG & ADDITIONAL TESTS: Reviewed.	    Assessment and Plan:    Assessment:  · Assessment		  89F with a PMH significant for HTN, HLD, Breast CA, AAA repair, former smoker, GERD, anxiety/insomnia, recently admitted from - for PNA and hypercalcemia, who presents directly from her oncologist's office, Dr. Valdez, after being found to have hypercalcemia to 12.7 and WBC to 17.     Problem/Plan - 1:  ·  Problem: Hypercalcemia.  Plan: Ca elevated to 12.3 on recent admission; found to have Ca of 12.7 during outpatient visit, was 8.5 in May 2017. Patient not currently in thiazides. Lytic lesion seen on 6th rib on prior CXR from , concerning in the setting of history of breast cancer and smoking history.  During last admission PTRH neg, intact PTH <2  -IV Hydration   -s/p Pamidronate 60mg IVPB - may require repeat dose  -Monitor BMP closely     Problem/Plan - 2:  ·  Problem: Rib lesion.  Plan: Found to have right 6th rib lytic lesion on CXR from prior admission. Etiologies include breast cancer mets vs. MM vs. lung cancer given extensive smoking history. SPEP from last admission relatively unremarkable. Immunofixation from last admission was unremarkable.   -Await results of bone biopsy     Problem/Plan - 3:  ·  Problem: GERD (gastroesophageal reflux disease).  Plan: -Famotidine 20mg q24h.      Problem/Plan - 4:  ·  Problem: Hypothyroidism.  Plan: -F/u TSH   -C/w levothyroxine 75mcg/daily.      Problem/Plan - 5:  ·  Problem: Leukocytosis.  Plan: WBC of 17 on outpatient labs; 12.8 on discharge on . Recently treated for CAP. Patient reports improvement in cough and denies fevers/chills.   -F/u CXR   -F/u UA   -F/u CBC.      Problem/Plan - 6:  Problem: Anemia  - labs c/w mixed iron deficiency and anemia of chronic disease  - transfuse PRBCs if Hb < 7 g/dl     Problem/Plan - 7:  ·  Problem: HTN (hypertension).  Plan: -Metoprolol 50mg BID.      Problem/Plan - 8:  ·  Problem: Constipation.  Plan: -Miralax daily  -Monitor BM.      Problem/Plan - 9:  ·  Problem: Nutrition, metabolism, and development symptoms.  Plan: F: IVF at 100cc/h  E: Replete K > 4 and Mg >2  N: mechanical soft and thin liquids w/ ensure enlive BID.      Problem/Plan - 10:  Problem: Prophylactic measure. Plan; DVT ppx: SQH     DNR/DNI

## 2017-11-28 NOTE — PROGRESS NOTE ADULT - PROBLEM SELECTOR PLAN 2
Found to have right 6th rib lytic lesion on CXR from prior admission. Etiologies include breast cancer mets vs. MM vs. lung cancer given extensive smoking history. SPEP from last admission relatively unremarkable. Immunofixation from last admission was unremarkable.   -Repeat CXR this admission notes unchanged R lateral 6th rib lesion. Plan for IR guided biopsy of lytic lesion  -CA 15-3 (28.1-WNL), CA 29-27 (30-WNL), CEA (8.6 - elevated)  -f/u UPEP  -Heme-onc following; appreciate recs    #Vit D Deficiency  -Will give 50,000 IU Vit D today, weekly x 8 weeks Found to have right 6th rib lytic lesion on CXR from prior admission. Etiologies include breast cancer mets vs. MM vs. lung cancer given extensive smoking history. SPEP from last admission relatively unremarkable. Immunofixation from last admission was unremarkable.   -Repeat CXR this admission notes unchanged R lateral 6th rib lesion. Plan for IR guided biopsy of lytic lesion today  -CA 15-3 (28.1-WNL), CA 29-27 (30-WNL), CEA (8.6 - elevated)  -f/u UPEP  -Heme-onc following; appreciate recs    #Vit D Deficiency  -50,000 IU Vit D given yesterday, c/w weekly x 7 additional weeks Found to have right 6th rib lytic lesion on CXR from prior admission. Etiologies include breast cancer mets vs. MM vs. lung cancer given extensive smoking history. SPEP from last admission relatively unremarkable. Immunofixation from last admission was unremarkable.   -Repeat CXR this admission notes unchanged R lateral 6th rib lesion. Plan for IR guided biopsy of lytic lesion today  -CT Chest w/o contrast notable for Interval progression in blastic & lytic changes most pronounced involving axillary aspect of the R 6th rib & superior endplate of L1 the lateral which is unchanged. Also notable for newly developing spiculated nodule w/in the subpleural region of apical segment of RUL which may represent a  primary lung carcinoma.  -CA 15-3 (28.1-WNL), CA 29-27 (30-WNL), CEA (8.6 - elevated)  -f/u UPEP  -Heme-onc following; appreciate recs    #Vit D Deficiency  -50,000 IU Vit D given yesterday, c/w weekly x 7 additional weeks Found to have right 6th rib lytic lesion on CXR from prior admission. Etiologies include breast cancer mets vs. MM vs. lung cancer given extensive smoking history. SPEP from last admission relatively unremarkable. Immunofixation from last admission was unremarkable.   -Repeat CXR this admission notes unchanged R lateral 6th rib lesion. Plan for IR guided biopsy of lytic lesion today  -CT Chest w/o contrast notable for Interval progression in blastic & lytic changes most pronounced involving axillary aspect of the R 6th rib & superior endplate of L1 the lateral which is unchanged. Also notable for newly developing spiculated nodule w/in the subpleural region of apical segment of RUL which may represent a  primary lung carcinoma.  -CA 15-3 (28.1-WNL), CA 29-27 (30-WNL), CEA (8.6 - elevated)  -f/u UPEP  -Heme-onc following; appreciate recs    #Vit D Deficiency  -50,000 IU Vit D given yesterday, c/w weekly x 7 additional weeks  -f/u Vit D 25-OH level

## 2017-11-28 NOTE — PROGRESS NOTE ADULT - SUBJECTIVE AND OBJECTIVE BOX
OVERNIGHT EVENTS:    SUBJECTIVE / INTERVAL HPI: Patient seen and examined at bedside.     VITAL SIGNS:  Vital Signs Last 24 Hrs  T(C): 36.5 (2017 05:00), Max: 38.6 (2017 19:21)  T(F): 97.7 (2017 05:00), Max: 101.5 (2017 19:21)  HR: 90 (2017 05:00) (77 - 123)  BP: 128/74 (2017 05:00) (105/71 - 184/85)  BP(mean): --  RR: 18 (2017 05:00) (16 - 20)  SpO2: 98% (2017 05:00) (93% - 100%)    PHYSICAL EXAM:    General: WDWN  HEENT: NC/AT; PERRL, anicteric sclera; MMM  Neck: supple  Cardiovascular: +S1/S2; RRR  Respiratory: CTA B/L; no W/R/R  Gastrointestinal: soft, NT/ND; +BSx4  Extremities: WWP; no edema, clubbing or cyanosis  Vascular: 2+ radial, DP/PT pulses B/L  Neurological: AAOx3; no focal deficits    MEDICATIONS:  MEDICATIONS  (STANDING):  ALBUTerol/ipratropium for Nebulization 3 milliLiter(s) Nebulizer every 6 hours  cyanocobalamin 500 MICROGram(s) Oral daily  ergocalciferol 87693 Unit(s) Oral every week  famotidine    Tablet 20 milliGRAM(s) Oral daily  heparin  Injectable 5000 Unit(s) SubCutaneous every 12 hours  levothyroxine 75 MICROGram(s) Oral daily  metoprolol     tartrate 50 milliGRAM(s) Oral two times a day  piperacillin/tazobactam IVPB. 2.25 Gram(s) IV Intermittent every 6 hours  polyethylene glycol 3350 17 Gram(s) Oral two times a day  simvastatin 10 milliGRAM(s) Oral at bedtime  sodium chloride 0.9%. 1000 milliLiter(s) (70 mL/Hr) IV Continuous <Continuous>  sodium chloride 5% Solution 1 Drop(s) Both EYES three times a day  vancomycin  IVPB 750 milliGRAM(s) IV Intermittent every 24 hours    MEDICATIONS  (PRN):  acetaminophen   Tablet. 650 milliGRAM(s) Oral every 6 hours PRN Mild Pain (1 - 3)  oxyCODONE    IR 5 milliGRAM(s) Oral every 6 hours PRN Severe Pain (7 - 10)      ALLERGIES:  Allergies    No Known Allergies    Intolerances        LABS:                        8.0    12.5  )-----------( 201      ( 2017 20:42 )             25.6         132<L>  |  96  |  21  ----------------------------<  135<H>  4.5   |  20<L>  |  1.06    Ca    8.6      2017 20:42  Phos  2.5       Mg     2.0         TPro  7.5  /  Alb  2.3<L>  /  TBili  0.2  /  DBili  x   /  AST  24  /  ALT  23  /  AlkPhos  92      PT/INR - ( 2017 20:42 )   PT: 12.1 sec;   INR: 1.09          PTT - ( 2017 20:42 )  PTT:29.4 sec  Urinalysis Basic - ( 2017 20:02 )    Color: Yellow / Appearance: SL Cloudy / S.010 / pH: x  Gluc: x / Ketone: NEGATIVE  / Bili: Negative / Urobili: 0.2 E.U./dL   Blood: x / Protein: 30 mg/dL / Nitrite: NEGATIVE   Leuk Esterase: NEGATIVE / RBC: < 5 /HPF / WBC < 5 /HPF   Sq Epi: x / Non Sq Epi: Many /HPF / Bacteria: Present /HPF      CAPILLARY BLOOD GLUCOSE          RADIOLOGY & ADDITIONAL TESTS: Reviewed.    ASSESSMENT:    PLAN: OVERNIGHT EVENTS: Around 7:20 pm, patient spiked fever to 101.5F, tachy 123, /85. Desaturated to 93%--> on supplemental 02, given duonebs. Altered MS. Wheezing on exam. CBC, BMP, Mg, Lactate, PT/PTT, T&S drawn. Lactate 1.8. Given Tylenol suppository. UA negative. CXR notable for worsening L sided infiltrate and/or effusion. Initiated on Vanc/Zosyn, s/p 500 cc NS bolus x2 overnight. Made NPO 2/2 to high suspicion for aspiration PNA.    SUBJECTIVE / INTERVAL HPI: Patient seen and examined at bedside. Patient alert this morning but still appears somewhat confused as she believes she is in her HHA's apartment. Patient continues to endorse R sided chest pain which is likely attributable to the R lytic bone lesion. Patient denies any additional episodes of fever, chills, NS, shortness of breath, chest pain, nausea/vomiting, diarrhea/constipation. Rmainder of ROS negative.    VITAL SIGNS:  Vital Signs Last 24 Hrs  T(C): 36.5 (2017 05:00), Max: 38.6 (2017 19:21)  T(F): 97.7 (2017 05:00), Max: 101.5 (2017 19:21)  HR: 90 (2017 05:00) (77 - 123)  BP: 128/74 (2017 05:00) (105/71 - 184/85)  BP(mean): --  RR: 18 (2017 05:00) (16 - 20)  SpO2: 98% (2017 05:00) (93% - 100%)    PHYSICAL EXAM:      Constitutional: Frail, elderly female, resting comfortably in bed, dry, HHA at bedside  	HEENT: NC/AT, PERRL, EOMI, anicteric sclera, uvula midline, + oropharyngeal erythema; - exudates; very dry MM  	Neck: supple    Lymph: no submandibular or cervical LAD  	Respiratory: R sided basilar rhonchi; decreased BS at L lung base; no retractions; speaking in full senstances  	Cardiac: +S1/S2; irregular rhythm; no M/R/G appreciated on auscultation  	Gastrointestinal: abdomen soft, NT/ND; no rebound or guarding; +BS  	Extremities: WWP, no clubbing or cyanosis; no peripheral edema, large ecchymoses overlying shins b/l from recent falls (unchanged)  	Musculoskeletal: no joint swelling, tenderness or erythema  	Vascular: 2+ radial DP pulses B/L, cool extremities  	Dermatologic: skin is warm/dry/intact; scattered bruising over b/l shins     Neurologic: AAOx1-2 (confused this morning, believes she is in her HHA's apartment); CNII-XII grossly intact; no focal deficits    MEDICATIONS:  MEDICATIONS  (STANDING):  ALBUTerol/ipratropium for Nebulization 3 milliLiter(s) Nebulizer every 6 hours  cyanocobalamin 500 MICROGram(s) Oral daily  ergocalciferol 55634 Unit(s) Oral every week  famotidine    Tablet 20 milliGRAM(s) Oral daily  heparin  Injectable 5000 Unit(s) SubCutaneous every 12 hours  levothyroxine 75 MICROGram(s) Oral daily  metoprolol     tartrate 50 milliGRAM(s) Oral two times a day  piperacillin/tazobactam IVPB. 2.25 Gram(s) IV Intermittent every 6 hours  polyethylene glycol 3350 17 Gram(s) Oral two times a day  simvastatin 10 milliGRAM(s) Oral at bedtime  sodium chloride 0.9%. 1000 milliLiter(s) (70 mL/Hr) IV Continuous <Continuous>  sodium chloride 5% Solution 1 Drop(s) Both EYES three times a day  vancomycin  IVPB 750 milliGRAM(s) IV Intermittent every 24 hours    MEDICATIONS  (PRN):  acetaminophen   Tablet. 650 milliGRAM(s) Oral every 6 hours PRN Mild Pain (1 - 3)  oxyCODONE    IR 5 milliGRAM(s) Oral every 6 hours PRN Severe Pain (7 - 10)      ALLERGIES:  Allergies    No Known Allergies    Intolerances        LABS:                        8.0    12.5  )-----------( 201      ( 2017 20:42 )             25.6     11-    132<L>  |  96  |  21  ----------------------------<  135<H>  4.5   |  20<L>  |  1.06    Ca    8.6      2017 20:42  Phos  2.5     11-  Mg     2.0     -    TPro  7.5  /  Alb  2.3<L>  /  TBili  0.2  /  DBili  x   /  AST  24  /  ALT  23  /  AlkPhos  92  11-27    PT/INR - ( 2017 20:42 )   PT: 12.1 sec;   INR: 1.09          PTT - ( 2017 20:42 )  PTT:29.4 sec  Urinalysis Basic - ( 2017 20:02 )    Color: Yellow / Appearance: SL Cloudy / S.010 / pH: x  Gluc: x / Ketone: NEGATIVE  / Bili: Negative / Urobili: 0.2 E.U./dL   Blood: x / Protein: 30 mg/dL / Nitrite: NEGATIVE   Leuk Esterase: NEGATIVE / RBC: < 5 /HPF / WBC < 5 /HPF   Sq Epi: x / Non Sq Epi: Many /HPF / Bacteria: Present /HPF      CAPILLARY BLOOD GLUCOSE          RADIOLOGY & ADDITIONAL TESTS: Reviewed.    ASSESSMENT:    PLAN: PGY1 Off Service Note  Hospital Course: 89 year old female former 60 PY smoker with PMHx significant for HTN, HLD, Breast CA, AAA repair, GERD, anxiety/insomnia who presented to [facility] on 17 directly from her oncologist’s office (Dr. Valdez) after being found to have hypercalcemia to 12.7 and WBC to 17 on outpatient labs. On admission, VS were stable however patient was found to be disoriented (AA0x2 compared to AA0x3 baseline as per HHA). Patient s/p IVF and 60 mg Pamidronate x1 with downtrend and improvement of serum Calcium. Leukocytosis continued to downtrend while inpatient with no signs or symptoms indicative of underlying infection. Of note, patient recently admitted (-) to [facility] for sepsis 2/2 to PNA. Patient also noted to have hypercalcemia at the time as well. CXR at time of last admission, notable for R 6th rib lytic lesion. Given patient's prior hx of breast cancer, high suspicion for malignancy. IR consulted for bx of lytic lesion. Prior to bx, patient underwent CT Chest confirming lytic bony lesion as well as newly developing spiculated nodule within the subpleural region of the apical segment of the RUL which may represent a synchronous primary lung carcinoma. On the evening of , patient became septic, spiked a temp 101.5F, Hypertensive with SBPs in 180s, tachycardic to 120s and desaturated to 93% RA. Patient subsequently placed on supplemental 02. Labs notable for mild leukocytosis (12.5), negative lactate 1.8, UA negative. Blood cx revealing no growth to date. CXR revealing worsening L sided infiltrate and/or pleural effusion, highly suspicious for aspiration PNA. Lung exam revealed diffuse wheezing throughout. Initiated BS abxs with vancomycin and Zosyn. Patient made NPO, re-evaluated by Speech and swallow, passed. Patient continued on IV BS abxs with clinical improvement as well as in VS and labs. Patient to go undergo bx of lytic bone lesion on .     OVERNIGHT EVENTS: Around 7:20 pm, patient spiked fever to 101.5F, tachy 123, /85. Desaturated to 93%--> on supplemental 02, given duonebs. Altered MS. Wheezing on exam. CBC, BMP, Mg, Lactate, PT/PTT, T&S drawn. Lactate 1.8. Given Tylenol suppository. UA negative. CXR notable for worsening L sided infiltrate and/or effusion. Initiated on Vanc/Zosyn, s/p 500 cc NS bolus x2 overnight. Made NPO 2/2 to high suspicion for aspiration PNA.    SUBJECTIVE / INTERVAL HPI: Patient seen and examined at bedside. Patient alert this morning but still appears somewhat confused as she believes she is in her HHA's apartment. Patient continues to endorse R sided chest pain which is likely attributable to the R lytic bone lesion. Patient denies any additional episodes of fever, chills, NS, shortness of breath, chest pain, nausea/vomiting, diarrhea/constipation. Rmainder of ROS negative.    VITAL SIGNS:  Vital Signs Last 24 Hrs  T(C): 36.5 (2017 05:00), Max: 38.6 (2017 19:21)  T(F): 97.7 (2017 05:00), Max: 101.5 (2017 19:21)  HR: 90 (2017 05:00) (77 - 123)  BP: 128/74 (2017 05:00) (105/71 - 184/85)  BP(mean): --  RR: 18 (2017 05:00) (16 - 20)  SpO2: 98% (2017 05:00) (93% - 100%)    PHYSICAL EXAM:      Constitutional: Frail, elderly female, resting comfortably in bed, dry, HHA at bedside  	HEENT: NC/AT, PERRL, EOMI, anicteric sclera, uvula midline, + oropharyngeal erythema; - exudates; very dry MM  	Neck: supple    Lymph: no submandibular or cervical LAD  	Respiratory: R sided basilar rhonchi; decreased BS at L lung base; no retractions; speaking in full senstances  	Cardiac: +S1/S2; irregular rhythm; no M/R/G appreciated on auscultation  	Gastrointestinal: abdomen soft, NT/ND; no rebound or guarding; +BS  	Extremities: WWP, no clubbing or cyanosis; no peripheral edema, large ecchymoses overlying shins b/l from recent falls (unchanged)  	Musculoskeletal: no joint swelling, tenderness or erythema  	Vascular: 2+ radial DP pulses B/L, cool extremities  	Dermatologic: skin is warm/dry/intact; scattered bruising over b/l shins     Neurologic: AAOx1-2 (confused this morning, believes she is in her HHA's apartment); CNII-XII grossly intact; no focal deficits    MEDICATIONS:  MEDICATIONS  (STANDING):  ALBUTerol/ipratropium for Nebulization 3 milliLiter(s) Nebulizer every 6 hours  cyanocobalamin 500 MICROGram(s) Oral daily  ergocalciferol 99969 Unit(s) Oral every week  famotidine    Tablet 20 milliGRAM(s) Oral daily  heparin  Injectable 5000 Unit(s) SubCutaneous every 12 hours  levothyroxine 75 MICROGram(s) Oral daily  metoprolol     tartrate 50 milliGRAM(s) Oral two times a day  piperacillin/tazobactam IVPB. 2.25 Gram(s) IV Intermittent every 6 hours  polyethylene glycol 3350 17 Gram(s) Oral two times a day  simvastatin 10 milliGRAM(s) Oral at bedtime  sodium chloride 0.9%. 1000 milliLiter(s) (70 mL/Hr) IV Continuous <Continuous>  sodium chloride 5% Solution 1 Drop(s) Both EYES three times a day  vancomycin  IVPB 750 milliGRAM(s) IV Intermittent every 24 hours    MEDICATIONS  (PRN):  acetaminophen   Tablet. 650 milliGRAM(s) Oral every 6 hours PRN Mild Pain (1 - 3)  oxyCODONE    IR 5 milliGRAM(s) Oral every 6 hours PRN Severe Pain (7 - 10)      ALLERGIES:  Allergies    No Known Allergies    Intolerances        LABS:                        8.0    12.5  )-----------( 201      ( 2017 20:42 )             25.6         132<L>  |  96  |  21  ----------------------------<  135<H>  4.5   |  20<L>  |  1.06    Ca    8.6      2017 20:42  Phos  2.5       Mg     2.0         TPro  7.5  /  Alb  2.3<L>  /  TBili  0.2  /  DBili  x   /  AST  24  /  ALT  23  /  AlkPhos  92      PT/INR - ( 2017 20:42 )   PT: 12.1 sec;   INR: 1.09          PTT - ( 2017 20:42 )  PTT:29.4 sec  Urinalysis Basic - ( 2017 20:02 )    Color: Yellow / Appearance: SL Cloudy / S.010 / pH: x  Gluc: x / Ketone: NEGATIVE  / Bili: Negative / Urobili: 0.2 E.U./dL   Blood: x / Protein: 30 mg/dL / Nitrite: NEGATIVE   Leuk Esterase: NEGATIVE / RBC: < 5 /HPF / WBC < 5 /HPF   Sq Epi: x / Non Sq Epi: Many /HPF / Bacteria: Present /HPF      CAPILLARY BLOOD GLUCOSE          RADIOLOGY & ADDITIONAL TESTS: Reviewed.    ASSESSMENT:    PLAN:

## 2017-11-28 NOTE — DISCHARGE NOTE ADULT - CARE PROVIDER_API CALL
Azar Valdez (MD), Medicine  [address] [address]  Phone: [phone]  Fax: [phone] Azar Valdez (MD), Medicine  [address] [place] [address]  Phone: [phone]  Fax: [phone]    Epifanio Vallecillo), Internal Medicine  [address] [place] [address]  Phone: [phone]  Fax: [phone]

## 2017-11-28 NOTE — PROGRESS NOTE ADULT - PROBLEM SELECTOR PLAN 4
Resolved. WBC of 17 on outpatient labs; downtrending. WBC 10 this AM  -Will continue to monitor with daily CBCs. No abxs warranted at this time.   -CXR notable for LLL consolidation w/ associated unchanged small layering L parapneumonic effusion.   -F/u UA   -c/w daily CBCs Patient with leukocytosis of 12.9 yesterday AM (11/27). Spiked fever 101.5F (rectal) evening of 11/27. Repeat CBC revealed leukocytosis of 12.5. Repeat CXR revealing worsening L sided infiltrate and/or effusion. Bcx pending. UA negative. Lactate 1.8. Exam notable for diffuse wheezing. Initiated on BS abx with Vancomycin/Zosyn on 11/27.   -f/u Vanc trough on 11/30 (evening)  -Continue to trend CBC with daily CBCs Patient AA0x3 at baseline per HHA/family. AA0x2 @ baseline on admission. Possibly 2/2 to hypercalcemia noted on admission. Ca improving.  -Patient became septic and altered overnight (afebrile, tachycardic, with worsening L sided infiltrate noted on CXR highly suspicious for aspiration PNA). Initiated on BS abxs (Vanc/Zosyn)  -VSS this AM. Patient still confused this morning. Will continue to monitor VS and MS.

## 2017-11-28 NOTE — PROGRESS NOTE ADULT - PROVIDER SPECIALTY LIST ADULT
Heme/Onc
Internal Medicine
Heme/Onc

## 2017-11-28 NOTE — DISCHARGE NOTE ADULT - HOSPITAL COURSE
89 year old female with PMHx significant for HTN, HLD, Breast CA, AAA repair, former smoker (60 PY), GERD, anxiety/insomnia, recently admitted from 11/14-11/17 for PNA and hypercalcemia, who presented to [facility] on 11/22/17 directly from her oncologist’s office (Dr. Valdez) after being found to have hypercalcemia to 12.7 and WBC to 17. On admission, her vitals were stable but she was found to be disoriented (AA0x2 compared to AA0x3 baseline as per [facility]). Workup for leukocytosis has included UA and blood cultures which were negative, and CXR’s that most recently (11/27) demonstrated worsening of L-side infiltrate and/or pleural effusion. She was therefore subsequently started on IV vancomycin 750mg QD and IV zosyn 2.25g q6h. Her WBC counts have remained stable since admission, with her last count at 11.6 on 11/28. Given her recent history of falls as per [facility], CT head was performed on 11/23 which was insignificant for any acute intracranial hemorrhage. As part of workup for hypercalcemia, patient has had followup for lytic lesion seen on 6th rib on prior CXR from 11/06 concerning for malignancy. CXR on 11/22 and CT chest on 11/27 consistently demonstrated unchanged lytic lesion on 6th rib, with no additional lytic lesions, and patient underwent IR-guided biopsy of lesion on 11/28. She was initially given Pamidronate 60mg IVPB on 11/22 and her serum calcium levels have been downtrending, most recently 8.1 on 11/28. Her 1,25-dihydroxyvitamin D level was found to be 20.9 and her PTH level was 2 (from 11/24). During her stay, patient has additionally received IVF (NS at 100cc/h) and electrolyte repletion to a target of K>4, Mg>2. She is DNR/DNI and is currently NPO given concern for aspiration, with speech/swallow evaluation pending. 89 year old female former 60 PY smoker with PMHx significant for HTN, HLD, Breast CA, AAA repair, GERD, anxiety/insomnia who presented to [facility] on 11/22/17 directly from her oncologist’s office (Dr. Valdez) after being found to have hypercalcemia to 12.7 and WBC to 17 on outpatient labs. On admission, VS were stable however patient was found to be disoriented (AA0x2 compared to AA0x3 baseline as per A). Patient s/p IVF and 60 mg Pamidronate x1 with downtrend and improvement of serum Calcium. Leukocytosis continued to downtrend while inpatient with no signs or symptoms indicative of underlying infection. Of note, patient recently admitted (11/14-11/17) to [facility] for sepsis 2/2 to PNA. Patient also noted to have hypercalcemia at the time as well. CXR at time of last admission, notable for R 6th rib lytic lesion. Given patient's prior hx of breast cancer, high suspicion for malignancy. IR consulted for bx of lytic lesion. Prior to bx, patient underwent CT Chest confirming lytic bony lesion as well as newly developing spiculated nodule within the subpleural region of the   apical segment of the RUL which may represent a synchronous primary lung carcinoma. On the evening of 11/27, patient became septic, spiked a temp 101.5F, Hypertensive with SBPs in 180s, tachycardic to 120s and desaturated to 93% RA. Patient subsequently placed on supplemental 02. Labs notable for mild leukocytosis (12.5), negative lactate 1.8, UA negative. Blood cx revealing no growth to date. CXR revealing worsening L sided infiltrate and/or pleural effusion. Lung exam revealed diffuse wheezing throughout. Initiated BS abxs with vancomycin and Zosyn. Continued on IV BS abxs with clinical improvement as well as in VS and labs. Patient to go undergo bx of lytic bone lesion on 11/29.        Workup for leukocytosis included UA and blood cultures which were negative, and CXR’s that most recently (11/27) demonstrated worsening of L-side infiltrate and/or pleural effusion. She was therefore subsequently started on IV vancomycin 750mg QD and IV zosyn 2.25g q6h. Her WBC counts have remained stable since admission, with her last count at 11.6 on 11/28. Given her recent history of falls as per HHA, CT head was performed on 11/23 which was insignificant for any acute intracranial hemorrhage. As part of workup for hypercalcemia, patient has had followup for lytic lesion seen on 6th rib on prior CXR from 11/06 concerning for malignancy. CXR on 11/22 and CT chest on 11/27 consistently demonstrated unchanged lytic lesion on 6th rib, with no additional lytic lesions, and patient underwent IR-guided biopsy of lesion on 11/28. She was initially given Pamidronate 60mg IVPB on 11/22 and her serum calcium levels have been downtrending, most recently 8.1 on 11/28. Her 1,25-dihydroxyvitamin D level was found to be 20.9 and her PTH level was 2 (from 11/24). During her stay, patient has additionally received IVF (NS at 100cc/h) and electrolyte repletion to a target of K>4, Mg>2. She is DNR/DNI and is currently NPO given concern for aspiration, with speech/swallow evaluation pending. 89 year old female former 60 PY smoker with PMHx significant for HTN, HLD, Breast CA, AAA repair, GERD, anxiety/insomnia who presented to [facility] on 11/22/17 directly from her oncologist’s office (Dr. Valdez) after being found to have hypercalcemia to 12.7 and WBC to 17 on outpatient labs. On admission, VS were stable however patient was found to be disoriented (AA0x2 compared to AA0x3 baseline as per HHA). Patient s/p IVF and 60 mg Pamidronate x1 with downtrend and improvement of serum Calcium. Leukocytosis continued to downtrend while inpatient with no signs or symptoms indicative of underlying infection. Of note, patient recently admitted (11/14-11/17) to [facility] for sepsis 2/2 PNA. Patient also noted to have hypercalcemia at the time as well. CXR at time of last admission, notable for R 6th rib lytic lesion. Given patient's prior hx of breast cancer, high suspicion for malignancy. IR consulted for bx of lytic lesion. Prior to bx, patient underwent CT Chest confirming lytic bony lesion as well as newly developing spiculated nodule within the subpleural region of the apical segment of the RUL which may represent a synchronous primary lung carcinoma. On the evening of 11/27, patient became septic, spiked a temp 101.5F, Hypertensive with SBPs in 180s, tachycardic to 120s and desaturated to 93% RA. Patient subsequently placed on supplemental 02. Labs notable for mild leukocytosis (12.5), negative lactate 1.8, UA negative. Blood cx revealing no growth to date. CXR revealing worsening L sided infiltrate and/or pleural effusion, highly suspicious for aspiration PNA. Lung exam revealed diffuse wheezing throughout. Initiated BS abxs with vancomycin and Zosyn. Patient made NPO, re-evaluated by Speech and swallow on 11/28, passed. Patient continued on IV BS abxs with clinical improvement as well as in VS and labs. Patient to go undergo bx of lytic bone lesion on 11/29.     Patient HD stable and ready for discharge to [facility]. 89 year old female former 60 PY smoker with PMHx significant for HTN, HLD, Breast CA, AAA repair, GERD, anxiety/insomnia who presented to [facility] on 11/22/17 directly from her oncologist’s office (Dr. Valdez) after being found to have hypercalcemia to 12.7 and WBC to 17 on outpatient labs. On admission, VS were stable however patient was found to be disoriented (AA0x2 compared to AA0x3 baseline as per HHA). Patient s/p IVF and 60 mg Pamidronate x1 with downtrend and improvement of serum Calcium. Leukocytosis continued to downtrend while inpatient with no signs or symptoms indicative of underlying infection. Of note, patient recently admitted (11/14-11/17) to [facility] for sepsis 2/2 PNA. Patient also noted to have hypercalcemia at the time as well. CXR at time of last admission, notable for R 6th rib lytic lesion. Given patient's prior hx of breast cancer, high suspicion for malignancy. IR consulted for bx of lytic lesion. Prior to bx, patient underwent CT Chest confirming lytic bony lesion as well as newly developing spiculated nodule within the subpleural region of the apical segment of the RUL which may represent a synchronous primary lung carcinoma. On the evening of 11/27, patient became septic, spiked a temp 101.5F, Hypertensive with SBPs in 180s, tachycardic to 120s and desaturated to 93% RA. Patient subsequently placed on supplemental 02. Labs notable for mild leukocytosis (12.5), negative lactate 1.8, UA negative. Blood cx revealing no growth to date. CXR revealing worsening L sided infiltrate and/or pleural effusion, highly suspicious for aspiration PNA. Lung exam revealed diffuse wheezing throughout. Initiated BS abxs with vancomycin and Zosyn. Patient made NPO, re-evaluated by Speech and swallow on 11/28, passed. Patient continued on IV BS abxs with clinical improvement as well as in VS and labs. Patient underwent bx of lytic bone lesion on 11/29 and results will be relayed to patient. Patient to schedule outpatient follow up with Dr Valdez and Dr Vallecillo.        Patient HD stable and ready for discharge to [facility].

## 2017-11-28 NOTE — PROGRESS NOTE ADULT - PROBLEM SELECTOR PLAN 1
Improving. Ca elevated to 12.7 during outpatient visit, was 8.5 in May 2017.  Lytic lesion seen on 6th rib on prior CXR from 11/6, concerning for malignancy.  During last admission PTRH neg, intact PTH <2. S/p Pamindronate 11/22.  -sCa continues to downtrend, 8.6 today  -CT Chest w/o contrast today to better assess lytic lesion and other additional lesions  -Bx postponed to 11/28 until CT chest results. Plan for bx of rib lytic lesion with IR tomorrow, NPO after MN  -s/p Pamidronate 60mg IVPB x1  -Continue to monitor sCa with daily BMP  -F/u vitamin D serum, PTH, ACE  -Last BM 2 days ago, c/w current bowel regimen Improving. Ca elevated to 12.7 during outpatient visit, was 8.5 in May 2017.  Lytic lesion seen on 6th rib on prior CXR from 11/6, concerning for malignancy.  During last admission PTRH neg, intact PTH <2. S/p Pamidronate 11/22.  -sCa continues to downtrend, 8.6 today  -CT Chest w/o contrast today to better assess lytic lesion and other additional lesions  -Bx postponed to 11/28 until CT chest results. Plan for bx of rib lytic lesion with IR tomorrow, NPO after MN  -s/p Pamidronate 60mg IVPB x1  -Continue to monitor sCa with daily BMP  -F/u vitamin D serum, PTH, ACE  -Last BM 2 days ago, c/w current bowel regimen Resolved. Patient spiked fever of 101.5 F, tachycardic to 120s, HTN with SBPs 180s overnight. CXR revealing worsening L sided infiltrate +/- effusion, likely 2/2 to aspiration. Bcx NGTD. UA negative. Mild leukocytosis of 12.5. Lactate negative (1.8). Exam: + diffuse wheezing. 500 cc bolus x2. Initiated on BS abx with Vancomycin 750 mg q24h and Zosyn 2.25 g q6h on 11/27. Made NPO, fear of aspiration. Speech/Swallow eval today.  -Exam improved this AM, wheezing resolved with notable R basilar rhonchi and decreased BS at L lung base.   -c/w IVF @ 70 cc/hr Resolved. Patient spiked fever of 101.5 F, tachycardic to 120s, HTN with SBPs 180s, desaturated to 93%--> placed on NC with improvement in Sp02 overnight. CXR revealing worsening L sided infiltrate +/- effusion, likely 2/2 to aspiration. Bcx NGTD. UA negative. Mild leukocytosis of 12.5. Lactate negative (1.8). Exam: + diffuse wheezing. 500 cc bolus x2. Initiated on BS abx with Vancomycin 750 mg q24h and Zosyn 2.25 g q6h on 11/27. Made NPO, fear of aspiration. Speech/Swallow eval today.  -Exam improved this AM, wheezing resolved with notable R basilar rhonchi and decreased BS at L lung base.   -c/w IVF @ 70 cc/hr  -c/w Vanc/Zosyn  -Plan for Vanc trough AM 11/30

## 2017-11-28 NOTE — PROGRESS NOTE ADULT - ATTENDING COMMENTS
History of breast cancer: Lytic lesion concern for reoccurrence. Plan for IR Bx Monday.
Pt seen and examined, VSS, exam as above, labs reviewed, agree with assessment and plan as above as d/w HS.  CT today, bone biopsy tomorrow, and d/c home.  Starting Vit D s/p bisphosphonate dose, Ca improved, MS improved.
pt seen and examined by me. case d/w housestaff. agree with VS, PE, assessment and plan as outlined above.
Pt seen and examined, oernight events noted, vital reviewed, exam as above with L sided decreased BS and rhonchi on R base.  Labs and imaging reviewed.  89 yo F with  1. Sepsis 2/2 probably aspiration PNA vs HCAP- on vanco/zosyn today, will de-escalate to augmentin if improving   2. Toxic metabolic encephalopathy- 2/2 hypercalcemia and sepsis, improved  3. Hospital delirium- pleasantly confused, will not give anyfurther meds to manage  4. Hypercalcemia of malignancy- improved with IVF and bisphosphonate  5. 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 Vit D deficiency- repleting with 50K IU   7. CKD stage 3- stable  8. Anemia of chronic disease- trend CBCs  9. Leukocytosis- downtrending with abx  10. Aspiration risk- S+S re-eval  Dispo: home tomorrow with 24/7 HHA if continues to improve

## 2017-11-28 NOTE — PROGRESS NOTE ADULT - PROBLEM SELECTOR PLAN 6
-Famotidine 20mg q24h Patient with hyponatremia on baseline, Na 134 today, improving  -hypovolemic on examination, dry appearing. Possibly 2/2 to poor PO intake.  -Serum Osmolality 286 (WNL), f/u remainder of urine studies

## 2017-11-28 NOTE — DISCHARGE NOTE ADULT - PATIENT PORTAL LINK FT
“You can access the [facility] Patient Portal, offered by [facility], by registering with the following website: [email or web address]”

## 2017-11-28 NOTE — PROGRESS NOTE ADULT - PROBLEM SELECTOR PLAN 3
Patient with recent change over the last couple of weeks in mental status per HHA . Patient AA0x2 on admission (unchanged today), per HHA AA0x3 normally. Likely multifactorial. Possibly 2/2 to hypercalcemia vs malignancy vs infectious etiology vs dehydration. Head CT negative for ICH or recent infarction. Now improving.   -Unlikely infectious in etiology, WBC downtrending, afebrile, VSS, will continue to monitor.  -Ca downtrending, will continue to monitor with daily BMPs Patient AA0x3 at baseline per HHA/family. AA0x2 @ baseline on admission. Possibly 2/2 to hypercalcemia noted on admission. Ca improving.  -Patient became septic and altered overnight (afebrile, tachycardic, with worsening L sided infiltrate noted on CXR highly suspicious for aspiration PNA). Initiated on BS abxs (Vanc/Zosyn)  -VSS this AM. Patient still confused this morning. Will continue to monitor VS and MS. Improving. Ca elevated to 12.7 during outpatient visit, was 8.5 in May 2017.  Lytic lesion seen on 6th rib on prior CXR from 11/6, concerning for malignancy.  During last admission PTRH neg, intact PTH <2. S/p Pamidronate 11/22.  -sCa continues to downtrend, 8.1 today  -CT Chest w/o contrast today to better assess lytic lesion and other additional lesions  -Bx postponed to 11/28 until CT chest results. Plan for bx of rib lytic lesion with IR tomorrow, NPO after MN  -s/p Pamidronate 60mg IVPB x1  -Continue to monitor sCa with daily BMP  -F/u vitamin D serum, PTH, ACE  -Last BM 2 days ago, c/w current bowel regimen Improving. Ca elevated to 12.7 during outpatient visit, was 8.5 in May 2017.  Lytic lesion seen on 6th rib on prior CXR from 11/6, concerning for malignancy.  During last admission PTRH neg, intact PTH <2. S/p Pamidronate 11/22.  -sCa continues to downtrend, 8.1 today  -CT Chest w/o contrast today to better assess lytic lesion and other additional lesions  -Bx postponed to 11/28 until CT chest results. Plan for bx of rib lytic lesion with IR tomorrow, NPO after MN  -s/p Pamidronate 60mg IVPB x1  -Continue to monitor sCa with daily BMP  -F/u vitamin D serum (20- LOW), PTH (low, appropriately suppressed in setting of hypercalcemia), ACE (WNL)  -f/u Vit D 25-OH level

## 2017-11-28 NOTE — PROGRESS NOTE ADULT - PROBLEM SELECTOR PLAN 10
F: no IVF  E: Replete K > 4 and Mg >2  N: mechanical soft and thin liquids w/ ensure enlive BID  DVT ppx: SQH 5000 u q12h   DNR/DNI   Dispo: 7W F: IVF @ 70 cc/hr  E: Replete K > 4 and Mg >2  N: NPO given concern for aspiration, repeat Speech/Swallow eval  DVT ppx: SQH 5000 u q12h   DNR/DNI   Dispo: 7W F: IVF @ 70 cc/hr  E: Replete K > 4 and Mg >2  N: NPO given concern for aspiration, repeat Speech/Swallow eval    GERD: c/w Famotidine 20mg q24h  DVT ppx: SQH 5000 u q12h   DNR/DNI   Dispo: 7W

## 2017-11-28 NOTE — DISCHARGE NOTE ADULT - CARE PROVIDERS DIRECT ADDRESSES
[email or web address]

## 2017-11-28 NOTE — DISCHARGE NOTE ADULT - CARE PLAN
Principal Discharge DX:	Hypercalcemia  Secondary Diagnosis:	Rib lesion  Secondary Diagnosis:	Leukocytosis  Secondary Diagnosis:	Hyperlipidemia  Secondary Diagnosis:	Hypothyroidism  Secondary Diagnosis:	Essential hypertension  Secondary Diagnosis:	Toxic metabolic encephalopathy Principal Discharge DX:	Hypercalcemia  Goal:	To decrease the calcium levels in the blood and prevent future increases in this lab value  Secondary Diagnosis:	Rib lesion  Secondary Diagnosis:	Leukocytosis  Instructions for follow-up, activity and diet:	You were admitted to the hospital because you were found to have an elevated number of white blood cells (cells that fight infection in your body). On admission, you were not noted to have an infection. These numbers improved with time. During the course of your hospital stay, you developed a fever and had an increase in these blood cells again which was likely secondary to an infection that was noted in your lungs. You were started on intravenous antibiotics for which you improved after starting treatment.  Secondary Diagnosis:	Hyperlipidemia  Instructions for follow-up, activity and diet:	You have a known history of high cholesterol for which you already take a medication at home, Simvastatin 10 mg every night. Please continue to take this medication upon leaving the hospital. Please note no changes in dose/frequency of administration of this medication were not made during this hospitalization.  Secondary Diagnosis:	Hypothyroidism  Instructions for follow-up, activity and diet:	You have a known history of an underactive thyroid gland for which you already take a medication at home, Synthroid 75 mcg/day. Please continue to take this medication upon leaving the hospital. Please note no changes in dose/frequency of administration of this medication were not made during this hospitalization.  Secondary Diagnosis:	Essential hypertension  Instructions for follow-up, activity and diet:	You have a known history of high blood pressure for which you already take a medication at home, Metoprolol 50 mg twice daily. Please continue to take this medication upon leaving the hospital. Please note no changes in dose/frequency of administration of this medication were not made during this hospitalization.  Secondary Diagnosis:	Toxic metabolic encephalopathy Principal Discharge DX:	Hypercalcemia  Goal:	To decrease the calcium levels in the blood and prevent future increases in this lab value.  Instructions for follow-up, activity and diet:	You were found to have high levels of calcium in the blood - it was lowered with intravenous fluids along with another medication called Pamidronate.  Secondary Diagnosis:	Rib lesion  Goal:	Follow up with Dr Valdez at your earliest convenience  Secondary Diagnosis:	Leukocytosis  Instructions for follow-up, activity and diet:	You were admitted to the hospital because you were found to have an elevated number of white blood cells (cells that fight infection in your body). On admission, you were not noted to have an infection. These numbers improved with time. During the course of your hospital stay, you developed a fever and had an increase in these blood cells again which was likely secondary to an infection that was noted in your lungs. You were started on intravenous antibiotics (Vancomycin and Zosyn) after which you improved. after starting treatment. You will continue with 5 more days of treatment on an oral antibiotic - Augmentin 500mg twice a day.  Secondary Diagnosis:	Hyperlipidemia  Goal:	Please comply with your medications as prescribed and follow up with your primary care provider.  Instructions for follow-up, activity and diet:	You have a known history of high cholesterol for which you already take a medication at home, Simvastatin 10 mg every night. Please continue to take this medication upon leaving the hospital. Please note no changes in dose/frequency of administration of this medication were not made during this hospitalization.  Secondary Diagnosis:	Hypothyroidism  Goal:	Please comply with your medications as prescribed and follow up with your primary care provider.  Instructions for follow-up, activity and diet:	You have a known history of an underactive thyroid gland for which you already take a medication at home, Synthroid 75 mcg/day. Please continue to take this medication upon leaving the hospital. Please note no changes in dose/frequency of administration of this medication were not made during this hospitalization.  Secondary Diagnosis:	Essential hypertension  Goal:	Please comply with your medications as prescribed and follow up with your primary care provider.  Instructions for follow-up, activity and diet:	You have a known history of high blood pressure for which you already take a medication at home, Metoprolol 50 mg twice daily. Please continue to take this medication upon leaving the hospital. Please note no changes in dose/frequency of administration of this medication were not made during this hospitalization. Principal Discharge DX:	Hypercalcemia  Goal:	To decrease the calcium levels in the blood and prevent future increases in this lab value.  Instructions for follow-up, activity and diet:	You were found to have high levels of calcium in the blood - it was lowered with intravenous fluids along with another medication called Pamidronate. Please follow up with Dr Valdez for further management and for frequent assessment of your blood.  Secondary Diagnosis:	Rib lesion  Goal:	Follow up with Dr Valdez at your earliest convenience, results of the biopsy will be conveyed to her and Dr Vallecillo.  Instructions for follow-up, activity and diet:	You underwent a biopsy of a bone lesion on 11/29 with Interventional Radiology. The results of this biopsy will take several days to be complete. Dr Strauss will relay the results to your oncologist, Dr Valdez, as well as to your Primary Physician, Dr Vallecillo. From there, Dr Valdez will further manage your care.  Secondary Diagnosis:	Leukocytosis  Instructions for follow-up, activity and diet:	You were admitted to the hospital because you were found to have an elevated number of white blood cells (cells that fight infection in your body). On admission, you were not noted to have an infection. These numbers improved with time. During the course of your hospital stay, you developed a fever and had an increase in these blood cells again which was likely secondary to an infection that was noted in your lungs. You were started on intravenous antibiotics (Vancomycin and Zosyn) after which you improved. after starting treatment. You will continue with 5 more days of treatment on an oral antibiotic - Augmentin 500mg twice a day.  Secondary Diagnosis:	Hyperlipidemia  Goal:	Please comply with your medications as prescribed and follow up with your primary care provider.  Instructions for follow-up, activity and diet:	You have a known history of high cholesterol for which you already take a medication at home, Simvastatin 10 mg every night. Please continue to take this medication upon leaving the hospital. Please note no changes in dose/frequency of administration of this medication were not made during this hospitalization.  Secondary Diagnosis:	Hypothyroidism  Goal:	Please comply with your medications as prescribed and follow up with your primary care provider.  Instructions for follow-up, activity and diet:	You have a known history of an underactive thyroid gland for which you already take a medication at home, Synthroid 75 mcg/day. Please continue to take this medication upon leaving the hospital. Please note no changes in dose/frequency of administration of this medication were not made during this hospitalization.  Secondary Diagnosis:	Essential hypertension  Goal:	Please comply with your medications as prescribed and follow up with your primary care provider.  Instructions for follow-up, activity and diet:	You have a known history of high blood pressure for which you already take a medication at home, Metoprolol 50 mg twice daily. Please continue to take this medication upon leaving the hospital. Please note no changes in dose/frequency of administration of this medication were not made during this hospitalization.

## 2017-11-28 NOTE — DISCHARGE NOTE ADULT - FINDINGS/TREATMENT
Performed by Interventional Radiology on November 29th. Results will be relayed to Dr Valdez and Dr Vallecillo.

## 2017-11-28 NOTE — DISCHARGE NOTE ADULT - OTHER SIGNIFICANT FINDINGS
Comprehensive Metabolic Panel (11.22.17 @ 16:57)    Sodium, Serum: 135 mmol/L    Potassium, Serum: 4.5 mmol/L    Chloride, Serum: 96 mmol/L    Carbon Dioxide, Serum: 28 mmol/L    Anion Gap, Serum: 11 mmol/L    Blood Urea Nitrogen, Serum: 36: TYPE:(C=Critical, N=Notification, A=Abnormal) N  TESTS: _BUN,CA  DATE/TIME CALLED: 11/22/17 17:33_  CALLED TO: LIZETH GUAJARDO RN_  READ BACK (2 Patient Identifiers)(Y/N): _Y  READ BACK VALUES (Y/N): _Y  CALLED BY: _HMA mg/dL    Creatinine, Serum: 1.20 mg/dL    Glucose, Serum: 108 mg/dL    Calcium, Total Serum: 12.7: Checked result, consistent with patient history mg/dL    Protein Total, Serum: 8.2 g/dL    Albumin, Serum: 2.8 g/dL    Bilirubin Total, Serum: 0.2 mg/dL    Alkaline Phosphatase, Serum: 83 U/L    Aspartate Aminotransferase (AST/SGOT): 28 U/L    Alanine Aminotransferase (ALT/SGPT): 32 U/L    eGFR if Non : 39: The units for eGFR are ml/min/1.73m2 (normalized body surface area). The  eGFR is calculated from a serum creatinine using the CKD-EPI equation.  Other variables required for calculation are race, age and sex. Among  patients with chronic kidney disease (CKD), the eGFR is useful in  determining the stage of disease according to KDOQI CKD classification.  All eGFR results are reported numerically with the following  interpretation.          GFR                    With                        Without     (ml/min/1.73 m2)    Kidney Damage       Kidney Damage        >= 90                    Stage 1                     Normal        60-89                    Stage 2                     Decreased GFR        30-59                    Stage 3         Stage 3        15-29                    Stage 4                     Stage 4        < 15                      Stage 5                     Stage 5  Each stage of CKD assumes that the associated GFR level has been in  effect for at least 3 months. Determination of stages one and two (with  eGFR > 59 ml/min/m2) requires estimation of kidney damage for at least 3  months as defined by structural or functional abnormalities.  Limitations: All estimates of GFR will be less accurate for patientsat  extremes of muscle mass (including but not limited to frail elderly,  critically ill, or cancer patients), those with unusual diets, and those  with conditions associated with reduced secretion or extrarenal  elimination of creatinine. The eGFR equation is not recommended for use  in patients with unstable creatinine levels. mL/min/1.73M2    eGFR if : 46: The units for eGFR are ml/min/1.73m2 (normalized body surface area). The  eGFR is calculated from a serum creatinine using the CKD-EPI equation.  Other variables required for calculation are race, age and sex. Among  patients with chronic kidney disease (CKD), the eGFR is useful in  determining the stage of disease according to KDOQI CKD classification.  All eGFR results are reported numerically with the following  interpretation.          GFR                    With                        Without     (ml/min/1.73 m2)    Kidney Damage       Kidney Damage        >= 90                    Stage 1                     Normal        60-89                    Stage 2                     Decreased GFR        30-59                    Stage 3         Stage 3        15-29                    Stage 4                     Stage 4        < 15                      Stage 5                     Stage 5  Each stage of CKD assumes that the associated GFR level has been in  effect for at least 3 months. Determination of stages one and two (with  eGFR > 59 ml/min/m2) requires estimation of kidney damage for at least 3  months as defined by structural or functional abnormalities.  Limitations: All estimates of GFR will be less accurate for patientsat  extremes of muscle mass (including but not limited to frail elderly,  critically ill, or cancer patients), those with unusual diets, and those  with conditions associated with reduced secretion or extrarenal  elimination of creatinine. The eGFR equation is not recommended for use  in patients with unstable creatinine levels. mL/min/1.73M2

## 2017-11-28 NOTE — DISCHARGE NOTE ADULT - PLAN OF CARE
You have a known history of high cholesterol for which you already take a medication at home, Simvastatin 10 mg every night. Please continue to take this medication upon leaving the hospital. Please note no changes in dose/frequency of administration of this medication were not made during this hospitalization. You have a known history of an underactive thyroid gland for which you already take a medication at home, Synthroid 75 mcg/day. Please continue to take this medication upon leaving the hospital. Please note no changes in dose/frequency of administration of this medication were not made during this hospitalization. You have a known history of high blood pressure for which you already take a medication at home, Metoprolol 50 mg twice daily. Please continue to take this medication upon leaving the hospital. Please note no changes in dose/frequency of administration of this medication were not made during this hospitalization. To decrease the calcium levels in the blood and prevent future increases in this lab value You were admitted to the hospital because you were found to have an elevated number of white blood cells (cells that fight infection in your body). On admission, you were not noted to have an infection. These numbers improved with time. During the course of your hospital stay, you developed a fever and had an increase in these blood cells again which was likely secondary to an infection that was noted in your lungs. You were started on intravenous antibiotics for which you improved after starting treatment. To decrease the calcium levels in the blood and prevent future increases in this lab value. You were found to have high levels of calcium in the blood - it was lowered with intravenous fluids along with another medication called Pamidronate. Follow up with Dr Valdez at your earliest convenience You were admitted to the hospital because you were found to have an elevated number of white blood cells (cells that fight infection in your body). On admission, you were not noted to have an infection. These numbers improved with time. During the course of your hospital stay, you developed a fever and had an increase in these blood cells again which was likely secondary to an infection that was noted in your lungs. You were started on intravenous antibiotics (Vancomycin and Zosyn) after which you improved. after starting treatment. You will continue with 5 more days of treatment on an oral antibiotic - Augmentin 500mg twice a day. Please comply with your medications as prescribed and follow up with your primary care provider. You were found to have high levels of calcium in the blood - it was lowered with intravenous fluids along with another medication called Pamidronate. Please follow up with Dr Valdez for further management and for frequent assessment of your blood. Follow up with Dr Valdez at your earliest convenience, results of the biopsy will be conveyed to her and Dr Vallecillo. You underwent a biopsy of a bone lesion on 11/29 with Interventional Radiology. The results of this biopsy will take several days to be complete. Dr Strauss will relay the results to your oncologist, Dr Valdez, as well as to your Primary Physician, Dr Vallecillo. From there, Dr Valdez will further manage your care.

## 2017-11-28 NOTE — PROGRESS NOTE ADULT - PROBLEM SELECTOR PLAN 5
Patient with hyponatremia on baseline, Na 131 today. Of unclear etiology.   -hypovolemic on examination, dry appearing. Possibly 2/2 to poor PO intake.  -f/u urine studies Patient with hyponatremia on baseline, Na 134 today, improving  -hypovolemic on examination, dry appearing. Possibly 2/2 to poor PO intake.  -Serum Osmolality 286 (WNL), f/u remainder of urine studies Patient with leukocytosis of 12.9 yesterday AM (11/27). Spiked fever 101.5F (rectal) evening of 11/27. Repeat CBC revealed leukocytosis of 12.5. Repeat CXR revealing worsening L sided infiltrate and/or effusion. Bcx pending. UA negative. Lactate 1.8. Exam notable for diffuse wheezing. Initiated on BS abx with Vancomycin/Zosyn on 11/27.   -f/u Vanc trough on 11/30 (evening)  -Continue to trend CBC with daily CBCs

## 2017-11-28 NOTE — DISCHARGE NOTE ADULT - MEDICATION SUMMARY - MEDICATIONS TO TAKE
I will START or STAY ON the medications listed below when I get home from the hospital:    simvastatin 10 mg oral tablet  -- 1 tab(s) by mouth once a day (at bedtime)  -- Indication: For HLD (hyperlipidemia)    zolpidem 5 mg oral tablet  -- 1 tab(s) by mouth once a day (at bedtime), As Needed  -- Indication: For Insomnia (as needed only)    metoprolol tartrate 50 mg oral tablet  -- 1 tab(s) by mouth 2 times a day  -- Indication: For Essential hypertension    famotidine 20 mg oral tablet  -- 1 tab(s) by mouth once a day  -- Indication: For GERD (gastroesophageal reflux disease)    Colace 50 mg oral capsule  -- 1 cap(s) by mouth 2 times a day, As Needed for constipation  -- Indication: For Constipation    sodium chloride, hypertonic 5% ophthalmic solution  -- 1 application to each affected eye 3 times a day  -- Indication: For Dry Eyes    Augmentin 500 mg-125 mg oral tablet  -- 500 milligram(s) by mouth 2 times a day x 5 days   -- Finish all this medication unless otherwise directed by prescriber.  Take with food or milk.    -- Indication: For PNEUMONIA     Synthroid 75 mcg (0.075 mg) oral tablet  -- 1 tab(s) by mouth once a day  -- Indication: For Hypothyroidism    Vitamin B12 500 mcg oral tablet  -- 1 tab(s) by mouth once a day  -- Indication: For Nutrition

## 2017-11-28 NOTE — DISCHARGE NOTE ADULT - SECONDARY DIAGNOSIS.
Rib lesion Leukocytosis Hyperlipidemia Hypothyroidism Essential hypertension Toxic metabolic encephalopathy

## 2017-11-28 NOTE — PROGRESS NOTE ADULT - ASSESSMENT
89 F former 60 PY smoker with a PMH significant for HTN, HLD, Breast CA, AAA repair, former smoker, GERD, anxiety/insomnia, recently admitted from 11/14-11/17 for PNA and hypercalcemia, who presents directly from her oncologist's office, Dr. Valdez with hypercalcemia and leukocytosis, now improving, currently undergoing workup of underlying malignancy, awaiting IR bx (likely 11/28). 89 F former 60 PY smoker with a PMH significant for HTN, HLD, Breast CA, AAA repair, former smoker, GERD, anxiety/insomnia, recently admitted from 11/14-11/17 for PNA and hypercalcemia, who presents directly from her oncologist's office, Dr. Valdez with hypercalcemia and leukocytosis, now improving, currently undergoing workup of underlying malignancy, IR bx today (11/28).

## 2017-11-28 NOTE — SWALLOW BEDSIDE ASSESSMENT ADULT - COMMENTS
Received awake & alert, resting in bed. Daughter & private aide present. Family reports Pt coughs &/or regurgitates with PO intermittently, Pt reports that she prefers soft, chewable foods. Pt is known to this svc from inpt [facility] on 11/16/17, which revealed trace penetration with thin liquids, no aspiration and intermittent reflux into the cervical esophagus after the swallow.

## 2017-11-29 ENCOUNTER — RESULT REVIEW (OUTPATIENT)
Age: 82
End: 2017-11-29

## 2017-11-29 VITALS
HEART RATE: 96 BPM | OXYGEN SATURATION: 96 % | SYSTOLIC BLOOD PRESSURE: 122 MMHG | DIASTOLIC BLOOD PRESSURE: 62 MMHG | TEMPERATURE: 98 F | RESPIRATION RATE: 22 BRPM

## 2017-11-29 LAB
ANION GAP SERPL CALC-SCNC: 13 MMOL/L — SIGNIFICANT CHANGE UP (ref 5–17)
BUN SERPL-MCNC: 14 MG/DL — SIGNIFICANT CHANGE UP (ref 7–23)
CALCIUM SERPL-MCNC: 7.6 MG/DL — LOW (ref 8.4–10.5)
CHLORIDE SERPL-SCNC: 99 MMOL/L — SIGNIFICANT CHANGE UP (ref 96–108)
CO2 SERPL-SCNC: 21 MMOL/L — LOW (ref 22–31)
CREAT SERPL-MCNC: 1.02 MG/DL — SIGNIFICANT CHANGE UP (ref 0.5–1.3)
GLUCOSE SERPL-MCNC: 130 MG/DL — HIGH (ref 70–99)
HCT VFR BLD CALC: 22.8 % — LOW (ref 34.5–45)
HGB BLD-MCNC: 7.1 G/DL — LOW (ref 11.5–15.5)
MAGNESIUM SERPL-MCNC: 1.8 MG/DL — SIGNIFICANT CHANGE UP (ref 1.6–2.6)
MCHC RBC-ENTMCNC: 29 PG — SIGNIFICANT CHANGE UP (ref 27–34)
MCHC RBC-ENTMCNC: 31.1 G/DL — LOW (ref 32–36)
MCV RBC AUTO: 93.1 FL — SIGNIFICANT CHANGE UP (ref 80–100)
PLATELET # BLD AUTO: 250 K/UL — SIGNIFICANT CHANGE UP (ref 150–400)
POTASSIUM SERPL-MCNC: 3.7 MMOL/L — SIGNIFICANT CHANGE UP (ref 3.5–5.3)
POTASSIUM SERPL-SCNC: 3.7 MMOL/L — SIGNIFICANT CHANGE UP (ref 3.5–5.3)
RBC # BLD: 2.45 M/UL — LOW (ref 3.8–5.2)
RBC # FLD: 14.9 % — SIGNIFICANT CHANGE UP (ref 10.3–16.9)
SODIUM SERPL-SCNC: 133 MMOL/L — LOW (ref 135–145)
WBC # BLD: 10.8 K/UL — HIGH (ref 3.8–10.5)
WBC # FLD AUTO: 10.8 K/UL — HIGH (ref 3.8–10.5)

## 2017-11-29 PROCEDURE — 85730 THROMBOPLASTIN TIME PARTIAL: CPT

## 2017-11-29 PROCEDURE — 36415 COLL VENOUS BLD VENIPUNCTURE: CPT

## 2017-11-29 PROCEDURE — 77012 CT SCAN FOR NEEDLE BIOPSY: CPT | Mod: 26

## 2017-11-29 PROCEDURE — 86300 IMMUNOASSAY TUMOR CA 15-3: CPT

## 2017-11-29 PROCEDURE — 83735 ASSAY OF MAGNESIUM: CPT

## 2017-11-29 PROCEDURE — 92610 EVALUATE SWALLOWING FUNCTION: CPT | Mod: GN

## 2017-11-29 PROCEDURE — 82310 ASSAY OF CALCIUM: CPT

## 2017-11-29 PROCEDURE — C1769: CPT

## 2017-11-29 PROCEDURE — 83970 ASSAY OF PARATHORMONE: CPT

## 2017-11-29 PROCEDURE — 85025 COMPLETE CBC W/AUTO DIFF WBC: CPT

## 2017-11-29 PROCEDURE — 82306 VITAMIN D 25 HYDROXY: CPT

## 2017-11-29 PROCEDURE — 94640 AIRWAY INHALATION TREATMENT: CPT

## 2017-11-29 PROCEDURE — 71010: CPT | Mod: 26

## 2017-11-29 PROCEDURE — 97530 THERAPEUTIC ACTIVITIES: CPT

## 2017-11-29 PROCEDURE — 71045 X-RAY EXAM CHEST 1 VIEW: CPT

## 2017-11-29 PROCEDURE — 99239 HOSP IP/OBS DSCHRG MGMT >30: CPT

## 2017-11-29 PROCEDURE — 86900 BLOOD TYPING SEROLOGIC ABO: CPT

## 2017-11-29 PROCEDURE — 70460 CT HEAD/BRAIN W/DYE: CPT

## 2017-11-29 PROCEDURE — 86850 RBC ANTIBODY SCREEN: CPT

## 2017-11-29 PROCEDURE — 83605 ASSAY OF LACTIC ACID: CPT

## 2017-11-29 PROCEDURE — 32405: CPT

## 2017-11-29 PROCEDURE — 71250 CT THORAX DX C-: CPT

## 2017-11-29 PROCEDURE — 80053 COMPREHEN METABOLIC PANEL: CPT

## 2017-11-29 PROCEDURE — 83930 ASSAY OF BLOOD OSMOLALITY: CPT

## 2017-11-29 PROCEDURE — 86901 BLOOD TYPING SEROLOGIC RH(D): CPT

## 2017-11-29 PROCEDURE — 82378 CARCINOEMBRYONIC ANTIGEN: CPT

## 2017-11-29 PROCEDURE — 84100 ASSAY OF PHOSPHORUS: CPT

## 2017-11-29 PROCEDURE — 84443 ASSAY THYROID STIM HORMONE: CPT

## 2017-11-29 PROCEDURE — 82164 ANGIOTENSIN I ENZYME TEST: CPT

## 2017-11-29 PROCEDURE — 82652 VIT D 1 25-DIHYDROXY: CPT

## 2017-11-29 PROCEDURE — 88173 CYTOPATH EVAL FNA REPORT: CPT

## 2017-11-29 PROCEDURE — 85610 PROTHROMBIN TIME: CPT

## 2017-11-29 PROCEDURE — 87040 BLOOD CULTURE FOR BACTERIA: CPT

## 2017-11-29 PROCEDURE — 97161 PT EVAL LOW COMPLEX 20 MIN: CPT

## 2017-11-29 PROCEDURE — 88307 TISSUE EXAM BY PATHOLOGIST: CPT

## 2017-11-29 PROCEDURE — 81001 URINALYSIS AUTO W/SCOPE: CPT

## 2017-11-29 PROCEDURE — 77012 CT SCAN FOR NEEDLE BIOPSY: CPT

## 2017-11-29 PROCEDURE — 88305 TISSUE EXAM BY PATHOLOGIST: CPT

## 2017-11-29 PROCEDURE — 85027 COMPLETE CBC AUTOMATED: CPT

## 2017-11-29 PROCEDURE — 82330 ASSAY OF CALCIUM: CPT

## 2017-11-29 PROCEDURE — 80048 BASIC METABOLIC PNL TOTAL CA: CPT

## 2017-11-29 RX ADMIN — PIPERACILLIN AND TAZOBACTAM 200 GRAM(S): 4; .5 INJECTION, POWDER, LYOPHILIZED, FOR SOLUTION INTRAVENOUS at 12:45

## 2017-11-29 RX ADMIN — PREGABALIN 500 MICROGRAM(S): 225 CAPSULE ORAL at 12:45

## 2017-11-29 RX ADMIN — Medication 1 DROP(S): at 06:52

## 2017-11-29 RX ADMIN — PIPERACILLIN AND TAZOBACTAM 200 GRAM(S): 4; .5 INJECTION, POWDER, LYOPHILIZED, FOR SOLUTION INTRAVENOUS at 06:50

## 2017-11-29 RX ADMIN — Medication 3 MILLILITER(S): at 06:51

## 2017-11-29 RX ADMIN — Medication 50 MILLIGRAM(S): at 06:52

## 2017-11-29 RX ADMIN — Medication 75 MICROGRAM(S): at 06:52

## 2017-11-29 RX ADMIN — FAMOTIDINE 20 MILLIGRAM(S): 10 INJECTION INTRAVENOUS at 12:46

## 2017-11-29 RX ADMIN — POLYETHYLENE GLYCOL 3350 17 GRAM(S): 17 POWDER, FOR SOLUTION ORAL at 06:51

## 2017-11-29 RX ADMIN — LACTULOSE 20 GRAM(S): 10 SOLUTION ORAL at 12:46

## 2017-11-29 RX ADMIN — Medication 3 MILLILITER(S): at 12:45

## 2017-11-30 LAB
NON-GYNECOLOGICAL CYTOLOGY STUDY: SIGNIFICANT CHANGE UP
SURGICAL PATHOLOGY STUDY: SIGNIFICANT CHANGE UP

## 2017-12-04 DIAGNOSIS — A41.9 SEPSIS, UNSPECIFIED ORGANISM: ICD-10-CM

## 2017-12-04 DIAGNOSIS — E83.52 HYPERCALCEMIA: ICD-10-CM

## 2017-12-04 DIAGNOSIS — I12.9 HYPERTENSIVE CHRONIC KIDNEY DISEASE WITH STAGE 1 THROUGH STAGE 4 CHRONIC KIDNEY DISEASE, OR UNSPECIFIED CHRONIC KIDNEY DISEASE: ICD-10-CM

## 2017-12-04 DIAGNOSIS — M89.8X8 OTHER SPECIFIED DISORDERS OF BONE, OTHER SITE: ICD-10-CM

## 2017-12-04 DIAGNOSIS — R53.2 FUNCTIONAL QUADRIPLEGIA: ICD-10-CM

## 2017-12-04 DIAGNOSIS — Z87.891 PERSONAL HISTORY OF NICOTINE DEPENDENCE: ICD-10-CM

## 2017-12-04 DIAGNOSIS — D50.9 IRON DEFICIENCY ANEMIA, UNSPECIFIED: ICD-10-CM

## 2017-12-04 DIAGNOSIS — E87.1 HYPO-OSMOLALITY AND HYPONATREMIA: ICD-10-CM

## 2017-12-04 DIAGNOSIS — F41.9 ANXIETY DISORDER, UNSPECIFIED: ICD-10-CM

## 2017-12-04 DIAGNOSIS — E03.9 HYPOTHYROIDISM, UNSPECIFIED: ICD-10-CM

## 2017-12-04 DIAGNOSIS — N18.3 CHRONIC KIDNEY DISEASE, STAGE 3 (MODERATE): ICD-10-CM

## 2017-12-04 DIAGNOSIS — E78.5 HYPERLIPIDEMIA, UNSPECIFIED: ICD-10-CM

## 2017-12-04 DIAGNOSIS — K21.9 GASTRO-ESOPHAGEAL REFLUX DISEASE WITHOUT ESOPHAGITIS: ICD-10-CM

## 2017-12-04 DIAGNOSIS — D72.829 ELEVATED WHITE BLOOD CELL COUNT, UNSPECIFIED: ICD-10-CM

## 2017-12-04 DIAGNOSIS — G92 TOXIC ENCEPHALOPATHY: ICD-10-CM

## 2017-12-04 DIAGNOSIS — Z66 DO NOT RESUSCITATE: ICD-10-CM

## 2017-12-04 DIAGNOSIS — J69.0 PNEUMONITIS DUE TO INHALATION OF FOOD AND VOMIT: ICD-10-CM

## 2017-12-04 DIAGNOSIS — Z85.3 PERSONAL HISTORY OF MALIGNANT NEOPLASM OF BREAST: ICD-10-CM

## 2017-12-04 DIAGNOSIS — C79.51 SECONDARY MALIGNANT NEOPLASM OF BONE: ICD-10-CM

## 2017-12-04 DIAGNOSIS — D63.8 ANEMIA IN OTHER CHRONIC DISEASES CLASSIFIED ELSEWHERE: ICD-10-CM

## 2017-12-08 ENCOUNTER — INPATIENT (INPATIENT)
Facility: HOSPITAL | Age: 82
LOS: 4 days | Discharge: HOME HEALTH | DRG: 64 | End: 2017-12-13
Attending: PSYCHIATRY & NEUROLOGY | Admitting: PSYCHIATRY & NEUROLOGY
Payer: MEDICARE

## 2017-12-08 VITALS
OXYGEN SATURATION: 94 % | SYSTOLIC BLOOD PRESSURE: 117 MMHG | RESPIRATION RATE: 18 BRPM | DIASTOLIC BLOOD PRESSURE: 72 MMHG | HEART RATE: 91 BPM | TEMPERATURE: 99 F

## 2017-12-08 DIAGNOSIS — Z98.89 OTHER SPECIFIED POSTPROCEDURAL STATES: Chronic | ICD-10-CM

## 2017-12-08 DIAGNOSIS — Z98.890 OTHER SPECIFIED POSTPROCEDURAL STATES: Chronic | ICD-10-CM

## 2017-12-08 LAB
ALBUMIN SERPL ELPH-MCNC: 2.7 G/DL — LOW (ref 3.3–5)
ALP SERPL-CCNC: 102 U/L — SIGNIFICANT CHANGE UP (ref 40–120)
ALT FLD-CCNC: 29 U/L — SIGNIFICANT CHANGE UP (ref 10–45)
ANION GAP SERPL CALC-SCNC: 8 MMOL/L — SIGNIFICANT CHANGE UP (ref 5–17)
ANION GAP SERPL CALC-SCNC: 9 MMOL/L — SIGNIFICANT CHANGE UP (ref 5–17)
APPEARANCE UR: CLEAR — SIGNIFICANT CHANGE UP
APTT BLD: 29.5 SEC — SIGNIFICANT CHANGE UP (ref 27.5–37.4)
AST SERPL-CCNC: 29 U/L — SIGNIFICANT CHANGE UP (ref 10–40)
BACTERIA # UR AUTO: PRESENT /HPF
BASOPHILS NFR BLD AUTO: 0.1 % — SIGNIFICANT CHANGE UP (ref 0–2)
BILIRUB SERPL-MCNC: 0.3 MG/DL — SIGNIFICANT CHANGE UP (ref 0.2–1.2)
BILIRUB UR-MCNC: NEGATIVE — SIGNIFICANT CHANGE UP
BUN SERPL-MCNC: 22 MG/DL — SIGNIFICANT CHANGE UP (ref 7–23)
BUN SERPL-MCNC: 24 MG/DL — HIGH (ref 7–23)
CALCIUM SERPL-MCNC: 10.2 MG/DL — SIGNIFICANT CHANGE UP (ref 8.4–10.5)
CALCIUM SERPL-MCNC: 10.8 MG/DL — HIGH (ref 8.4–10.5)
CHLORIDE SERPL-SCNC: 94 MMOL/L — LOW (ref 96–108)
CHLORIDE SERPL-SCNC: 99 MMOL/L — SIGNIFICANT CHANGE UP (ref 96–108)
CO2 SERPL-SCNC: 24 MMOL/L — SIGNIFICANT CHANGE UP (ref 22–31)
CO2 SERPL-SCNC: 25 MMOL/L — SIGNIFICANT CHANGE UP (ref 22–31)
COLOR SPEC: YELLOW — SIGNIFICANT CHANGE UP
CREAT SERPL-MCNC: 0.99 MG/DL — SIGNIFICANT CHANGE UP (ref 0.5–1.3)
CREAT SERPL-MCNC: 1.02 MG/DL — SIGNIFICANT CHANGE UP (ref 0.5–1.3)
DIFF PNL FLD: NEGATIVE — SIGNIFICANT CHANGE UP
EOSINOPHIL NFR BLD AUTO: 0.2 % — SIGNIFICANT CHANGE UP (ref 0–6)
EPI CELLS # UR: (no result) /HPF (ref 0–5)
GLUCOSE SERPL-MCNC: 102 MG/DL — HIGH (ref 70–99)
GLUCOSE SERPL-MCNC: 148 MG/DL — HIGH (ref 70–99)
GLUCOSE UR QL: NEGATIVE — SIGNIFICANT CHANGE UP
HCT VFR BLD CALC: 25.6 % — LOW (ref 34.5–45)
HGB BLD-MCNC: 8.2 G/DL — LOW (ref 11.5–15.5)
HYALINE CASTS # UR AUTO: SIGNIFICANT CHANGE UP /LPF (ref 0–2)
INR BLD: 1.11 — SIGNIFICANT CHANGE UP (ref 0.88–1.16)
KETONES UR-MCNC: NEGATIVE — SIGNIFICANT CHANGE UP
LACTATE SERPL-SCNC: 1.3 MMOL/L — SIGNIFICANT CHANGE UP (ref 0.5–2)
LACTATE SERPL-SCNC: 1.8 MMOL/L — SIGNIFICANT CHANGE UP (ref 0.5–2)
LEUKOCYTE ESTERASE UR-ACNC: (no result)
LYMPHOCYTES # BLD AUTO: 5.1 % — LOW (ref 13–44)
MCHC RBC-ENTMCNC: 29.1 PG — SIGNIFICANT CHANGE UP (ref 27–34)
MCHC RBC-ENTMCNC: 32 G/DL — SIGNIFICANT CHANGE UP (ref 32–36)
MCV RBC AUTO: 90.8 FL — SIGNIFICANT CHANGE UP (ref 80–100)
MONOCYTES NFR BLD AUTO: 7.7 % — SIGNIFICANT CHANGE UP (ref 2–14)
NEUTROPHILS NFR BLD AUTO: 86.9 % — HIGH (ref 43–77)
NITRITE UR-MCNC: NEGATIVE — SIGNIFICANT CHANGE UP
PH UR: 6 — SIGNIFICANT CHANGE UP (ref 5–8)
PLATELET # BLD AUTO: 323 K/UL — SIGNIFICANT CHANGE UP (ref 150–400)
POTASSIUM SERPL-MCNC: 4.1 MMOL/L — SIGNIFICANT CHANGE UP (ref 3.5–5.3)
POTASSIUM SERPL-MCNC: 4.7 MMOL/L — SIGNIFICANT CHANGE UP (ref 3.5–5.3)
POTASSIUM SERPL-SCNC: 4.1 MMOL/L — SIGNIFICANT CHANGE UP (ref 3.5–5.3)
POTASSIUM SERPL-SCNC: 4.7 MMOL/L — SIGNIFICANT CHANGE UP (ref 3.5–5.3)
PROT SERPL-MCNC: 7.9 G/DL — SIGNIFICANT CHANGE UP (ref 6–8.3)
PROT UR-MCNC: (no result) MG/DL
PROTHROM AB SERPL-ACNC: 12.4 SEC — SIGNIFICANT CHANGE UP (ref 9.8–12.7)
RAPID RVP RESULT: SIGNIFICANT CHANGE UP
RBC # BLD: 2.82 M/UL — LOW (ref 3.8–5.2)
RBC # FLD: 16.3 % — SIGNIFICANT CHANGE UP (ref 10.3–16.9)
RBC CASTS # UR COMP ASSIST: < 5 /HPF — SIGNIFICANT CHANGE UP
SODIUM SERPL-SCNC: 128 MMOL/L — LOW (ref 135–145)
SODIUM SERPL-SCNC: 131 MMOL/L — LOW (ref 135–145)
SP GR SPEC: 1.01 — SIGNIFICANT CHANGE UP (ref 1–1.03)
UROBILINOGEN FLD QL: 0.2 E.U./DL — SIGNIFICANT CHANGE UP
WBC # BLD: 14.8 K/UL — HIGH (ref 3.8–10.5)
WBC # FLD AUTO: 14.8 K/UL — HIGH (ref 3.8–10.5)
WBC UR QL: (no result) /HPF

## 2017-12-08 PROCEDURE — 70544 MR ANGIOGRAPHY HEAD W/O DYE: CPT | Mod: 26,59

## 2017-12-08 PROCEDURE — 70551 MRI BRAIN STEM W/O DYE: CPT | Mod: 26

## 2017-12-08 PROCEDURE — 71010: CPT | Mod: 26

## 2017-12-08 PROCEDURE — 99285 EMERGENCY DEPT VISIT HI MDM: CPT | Mod: 25

## 2017-12-08 PROCEDURE — 70450 CT HEAD/BRAIN W/O DYE: CPT | Mod: 26

## 2017-12-08 PROCEDURE — 93010 ELECTROCARDIOGRAM REPORT: CPT

## 2017-12-08 PROCEDURE — 70547 MR ANGIOGRAPHY NECK W/O DYE: CPT | Mod: 26

## 2017-12-08 RX ORDER — ACETAMINOPHEN 500 MG
650 TABLET ORAL ONCE
Qty: 0 | Refills: 0 | Status: COMPLETED | OUTPATIENT
Start: 2017-12-08 | End: 2017-12-08

## 2017-12-08 RX ORDER — FAMOTIDINE 10 MG/ML
20 INJECTION INTRAVENOUS DAILY
Qty: 0 | Refills: 0 | Status: DISCONTINUED | OUTPATIENT
Start: 2017-12-08 | End: 2017-12-09

## 2017-12-08 RX ORDER — ASPIRIN/CALCIUM CARB/MAGNESIUM 324 MG
325 TABLET ORAL ONCE
Qty: 0 | Refills: 0 | Status: COMPLETED | OUTPATIENT
Start: 2017-12-08 | End: 2017-12-08

## 2017-12-08 RX ORDER — VANCOMYCIN HCL 1 G
750 VIAL (EA) INTRAVENOUS ONCE
Qty: 0 | Refills: 0 | Status: COMPLETED | OUTPATIENT
Start: 2017-12-08 | End: 2017-12-08

## 2017-12-08 RX ORDER — LEVOTHYROXINE SODIUM 125 MCG
75 TABLET ORAL DAILY
Qty: 0 | Refills: 0 | Status: DISCONTINUED | OUTPATIENT
Start: 2017-12-08 | End: 2017-12-13

## 2017-12-08 RX ORDER — PIPERACILLIN AND TAZOBACTAM 4; .5 G/20ML; G/20ML
3.38 INJECTION, POWDER, LYOPHILIZED, FOR SOLUTION INTRAVENOUS ONCE
Qty: 0 | Refills: 0 | Status: COMPLETED | OUTPATIENT
Start: 2017-12-08 | End: 2017-12-08

## 2017-12-08 RX ORDER — METOPROLOL TARTRATE 50 MG
50 TABLET ORAL
Qty: 0 | Refills: 0 | Status: DISCONTINUED | OUTPATIENT
Start: 2017-12-08 | End: 2017-12-13

## 2017-12-08 RX ORDER — PREGABALIN 225 MG/1
500 CAPSULE ORAL DAILY
Qty: 0 | Refills: 0 | Status: DISCONTINUED | OUTPATIENT
Start: 2017-12-08 | End: 2017-12-13

## 2017-12-08 RX ORDER — SODIUM CHLORIDE 9 MG/ML
1000 INJECTION INTRAMUSCULAR; INTRAVENOUS; SUBCUTANEOUS ONCE
Qty: 0 | Refills: 0 | Status: COMPLETED | OUTPATIENT
Start: 2017-12-08 | End: 2017-12-08

## 2017-12-08 RX ORDER — SODIUM CHLORIDE 9 MG/ML
1000 INJECTION INTRAMUSCULAR; INTRAVENOUS; SUBCUTANEOUS
Qty: 0 | Refills: 0 | Status: DISCONTINUED | OUTPATIENT
Start: 2017-12-08 | End: 2017-12-09

## 2017-12-08 RX ADMIN — Medication 325 MILLIGRAM(S): at 23:40

## 2017-12-08 RX ADMIN — SODIUM CHLORIDE 2000 MILLILITER(S): 9 INJECTION INTRAMUSCULAR; INTRAVENOUS; SUBCUTANEOUS at 13:31

## 2017-12-08 RX ADMIN — SODIUM CHLORIDE 125 MILLILITER(S): 9 INJECTION INTRAMUSCULAR; INTRAVENOUS; SUBCUTANEOUS at 19:04

## 2017-12-08 RX ADMIN — PIPERACILLIN AND TAZOBACTAM 200 GRAM(S): 4; .5 INJECTION, POWDER, LYOPHILIZED, FOR SOLUTION INTRAVENOUS at 12:52

## 2017-12-08 RX ADMIN — SODIUM CHLORIDE 2000 MILLILITER(S): 9 INJECTION INTRAMUSCULAR; INTRAVENOUS; SUBCUTANEOUS at 12:52

## 2017-12-08 RX ADMIN — Medication 650 MILLIGRAM(S): at 11:41

## 2017-12-08 RX ADMIN — Medication 250 MILLIGRAM(S): at 13:30

## 2017-12-08 NOTE — H&P ADULT - PROBLEM SELECTOR PLAN 5
BP control  stable lytic lesion on 6th rib not suggestive of breast CA mets, rather new malignancy.   - f/u Dr. Valdez

## 2017-12-08 NOTE — H&P ADULT - PROBLEM SELECTOR PLAN 10
NPO until passed bedside dysphagia   monitor/ replete electrolytes K>4, Mg>2   IVF @ 75cc/hr     DNR/DNI    Dispo: [facility]

## 2017-12-08 NOTE — ED PROVIDER NOTE - MEDICAL DECISION MAKING DETAILS
Pt w mult med problems w new dx of likely metastatic ca ? lung vs breast c/o slurred speech w unclear time of onset.  No other neuro deficits.  ? cva or ? worsening of old cva sx 2/2 lyte abnl and infection.  Pt w low grade temp, hr > 90 and wbc > 12 so sepsis protocol initiated after wbc reviewed - ivf and abx ordered; labs/cx already ordered, lactate ordered and pending.  CT head pending.  CXR pending.  Pt given abx for poss hcap since decreased bs at side of prev effusion/infiltrate; rvp pending.  Ca slightly elevated and na slightly lower than prev - ivf running.  Plan for admit.  Will discuss w Dr Valdez plan for admit and discuss poss neuro consult. Pt w mult med problems w new dx of likely metastatic ca ? lung vs breast c/o slurred speech w unclear time of onset.  No other neuro deficits.  ? cva or ? worsening of old cva sx 2/2 lyte abnl and infection.   Pt w low grade temp, hr > 90 and wbc > 12 so sepsis protocol initiated after wbc reviewed - ivf and abx ordered; labs/cx already ordered, lactate ordered and pending. ? pna, ? uti, ? fever 2/2 malignancy. CT head pending.  CXR pending.  Pt given abx for poss hcap since decreased bs at side of prev effusion/infiltrate; rvp pending.  Ca slightly elevated and na slightly lower than prev - ivf running.  Plan for admit.  Will discuss w Dr Valdez plan for admit and discuss poss neuro consult.

## 2017-12-08 NOTE — ED ADULT NURSE REASSESSMENT NOTE - NS ED NURSE REASSESS COMMENT FT1
Urine has been obtained with straight cath using sterile technique, clear yellow 200CC output noted. Pt tolerated procedure well. Pending for MRI. Will continue monitoring.

## 2017-12-08 NOTE — ED ADULT NURSE NOTE - OBJECTIVE STATEMENT
Pt BIBA from home for slurred speech which was noted by her aid this morning, last seen normal last night 19:00PM. Pt is awake and oriented to person and time, pt's daughter states "she seems a little more confuse today." No facial droop, no arm drift, pt denies chest pain, headache, SOB, dizziness, N+V. As per family member, pt was admitted here for about 10 days for pneumonia and discharged 11/29. Pt's aid states Pt BIBA from home for slurred speech which was noted by her aid this morning, last seen normal last night 19:00PM. Pt is awake and oriented to person and time, pt's daughter states "she seems a little more confuse today." No facial droop, no arm drift, pt denies chest pain, headache, SOB, dizziness, N+V. As per family member, pt was admitted here for about 10 days for pneumonia and discharged 11/29. Pt's aid states pt is still coughing with clear phlegm. Fever 100.4 Pt BIBA from home for slurred speech which was noted by her aid this morning, last seen normal last night 19:00PM. Pt is awake and oriented to person and time, pt's daughter states "she seems a little more confuse today." No facial droop, no arm drift, pt denies chest pain, headache, SOB, dizziness, N+V. As per family member, pt was admitted here for about 10 days for pneumonia and discharged 11/29. Pt's aid states pt is still coughing with clear phlegm.

## 2017-12-08 NOTE — H&P ADULT - HISTORY OF PRESENT ILLNESS
89F PMH breast cancer, AAA repair, GERD, HTN, anxiety, recent admission for sepsis 2/2 aspiration pneumonia, found to have hypercalcemia and lytic bone lesion w/ associated suspicious right lung mass(s/p bx 11/29) who presents w/ slurred speech noted this morning.        In the ED VS(T:99.1F HR:91 BP:117/72 RR:18 O2%:94 on RA ). Labs significant for WBC 14.8, H/H 8.2/25, Na 128, Ca 11.8(qing), UA w/ WBC, LE, bacteria. CTH revealed bony mets and lacunar infarcts, MRI revealed bony mets and multiple acute/subacute infarcts consistent with embolic phenomenon v. hypercoagulable state.  MRA negative.  Admitted to  for acute stroke. 89F PMH breast cancer, AAA repair, GERD, HTN, anxiety, recent admission for sepsis 2/2 aspiration pneumonia, found to have hypercalcemia and lytic bone lesion w/ associated suspicious right lung mass(s/p bx 11/29) who presents w/ slurred speech noted this morning.  Pt. outside the window for tPA.      In the ED VS(T:99.1F HR:91 BP:117/72 RR:18 O2%:94 on RA ). Labs significant for WBC 14.8, H/H 8.2/25, Na 128, Ca 11.8(qing), UA w/ WBC, LE, bacteria. CTH revealed bony mets and lacunar infarcts, MRI revealed bony mets and multiple acute/subacute infarcts consistent with embolic phenomenon v. hypercoagulable state.  MRA negative.  Admitted to  for acute stroke. 89F PMH breast cancer, AAA repair, GERD, HTN, anxiety, recent admission for sepsis 2/2 aspiration pneumonia, found to have hypercalcemia and lytic bone lesion w/ associated suspicious right lung mass(s/p bx 11/29) who presents w/ slurred speech noted this morning.  Pt. outside the window for tPA.  Symptoms resolved on presentation NIHSS 0. 10-system ROS negative except for left rib pain    In the ED VS(T:99.1F HR:91 BP:117/72 RR:18 O2%:94 on RA ). Labs significant for WBC 14.8, H/H 8.2/25, Na 128, Ca 11.8(qing), UA w/ WBC, LE, bacteria. CTH revealed bony mets and lacunar infarcts, MRI revealed bony mets and multiple acute/subacute infarcts consistent with embolic phenomenon v. hypercoagulable state.  MRA negative.  Admitted to  for acute stroke.

## 2017-12-08 NOTE — H&P ADULT - NSHPPHYSICALEXAM_GEN_ALL_CORE
.  VITAL SIGNS:  T(C): 37.2 (12-08-17 @ 23:29), Max: 38 (12-08-17 @ 10:52)  T(F): 99 (12-08-17 @ 23:29), Max: 100.4 (12-08-17 @ 10:52)  HR: 84 (12-08-17 @ 23:29) (67 - 92)  BP: 180/84 (12-08-17 @ 23:29) (109/60 - 180/84)  BP(mean): --  RR: 16 (12-08-17 @ 23:29) (16 - 20)  SpO2: 98% (12-08-17 @ 23:29) (94% - 98%)  Wt(kg): --    PHYSICAL EXAM:    Constitutional: WDWN resting comfortably in bed; NAD  Head: NC/AT  Eyes: PERRL, EOMI, clear conjunctiva  ENT: no nasal discharge; uvula midline, no oropharyngeal erythema or exudates; MMM  Neck: supple; no JVD or thyromegaly  Respiratory: CTA B/L; decreased at the bases   Cardiac: +S1/S2; RRR; no M/R/G; PMI non-displaced  Gastrointestinal: soft, NT/ND; no rebound or guarding; +BSx4  Extremities: non-edematous, extensive bruising lower extremities   Musculoskeletal: NROM x4  Dermatologic: skin warm, dry and intact; no rashes, wounds, or scars  Neurologic: AAOx3; no focal deficits NIH: 0 .  VITAL SIGNS:  T(C): 37.2 (12-08-17 @ 23:29), Max: 38 (12-08-17 @ 10:52)  T(F): 99 (12-08-17 @ 23:29), Max: 100.4 (12-08-17 @ 10:52)  HR: 84 (12-08-17 @ 23:29) (67 - 92)  BP: 180/84 (12-08-17 @ 23:29) (109/60 - 180/84)  BP(mean): --  RR: 16 (12-08-17 @ 23:29) (16 - 20)  SpO2: 98% (12-08-17 @ 23:29) (94% - 98%)  Wt(kg): --    PHYSICAL EXAM:  Constitutional: WDWN resting comfortably in bed; NAD  Head: NC/AT  Eyes: PERRL, EOMI, clear conjunctiva  ENT: no nasal discharge; uvula midline, no oropharyngeal erythema or exudates; MMM  Neck: supple; no JVD or thyromegaly  Respiratory: CTA B/L; decreased at the bases   Cardiac: +S1/S2; RRR; no M/R/G; PMI non-displaced  Gastrointestinal: soft, NT/ND; no rebound or guarding; +BSx4  Extremities: non-edematous, extensive bruising lower extremities   Musculoskeletal: NROM x4  Dermatologic: skin warm, dry and intact; no rashes, wounds, or scars  Neurologic: AAOx3; no focal deficits NIH: 0

## 2017-12-08 NOTE — ED ADULT TRIAGE NOTE - CHIEF COMPLAINT QUOTE
Slurred speech since 7pm last night as per EMS. Pt BIBA for further evaluation. EMS also endorses possible mental decline with relation to possible undiagnosed dementia. Patient currently A&O x 3

## 2017-12-08 NOTE — H&P ADULT - ASSESSMENT
89F PMH breast cancer, AAA repair, GERD, HTN, anxiety, recent admission for sepsis 2/2 aspiration pneumonia, found to have hypercalcemia and lytic bone lesion w/ associated suspicious right lung mass(s/p bx 11/29) who presents w/ slurred speech noted this morning.

## 2017-12-08 NOTE — H&P ADULT - PROBLEM SELECTOR PLAN 1
Slurred speech and lower extremity weakness this AM; last known normal last night (12/08/17) @ 7pm. 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 Patient only on simvastatin 10mg at home. Symptoms resolved while in ED. MRA H/N without occlusion   - s/p  and Lipitor 80mg in ED. Will c/w ASA 81mg daily & Lipitor 80mg qhs  - echocardiogram w/ bubble study   - PT/OT  - stroke education  - DVT ppx   - telemetry monitoring   - bedside dysphagia Slurred speech and lower extremity weakness this AM; last known normal last night (12/08/17) @ 7pm. 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 Patient only on simvastatin 10mg at home. Symptoms resolved while in ED. MRA H/N without occlusion   -NIHSS 0  - s/p  and Lipitor 80mg in ED. Will c/w ASA 81mg daily & Lipitor 80mg qhs  - echocardiogram w/ bubble study   - PT/OT  - stroke education  - DVT ppx   - telemetry monitoring   - bedside dysphagia Slurred speech and lower extremity weakness this AM; last known normal last night (12/08/17) @ 7pm. 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 Patient only on simvastatin 10mg at home. Symptoms resolved while in ED. MRA H/N without occlusion   - NIHSS 0  - s/p  and Lipitor 80mg in ED. Will c/w ASA 81mg daily & Lipitor 80mg qhs  - echocardiogram w/ bubble study   - PT/OT  - stroke education  - DVT ppx   - telemetry monitoring   - bedside dysphagia

## 2017-12-08 NOTE — ED PROVIDER NOTE - PROGRESS NOTE DETAILS
CXR improved from prev, lactate wnl, wbc elevated, na slightly lower than prev, ct head w/o acute stroke but new skull lytic lesions.  MRI/A ordered  PMD paged to discuss.  UA pending; straight cath ordered.  Speech improved compared to earlier, pt seems to have more energy.   Per Dr Salazar, hospitalist involved during pt's last admit, pt w slow mental recovery w any insult (hyperca, infection). CXR improved from prev, lactate wnl, wbc elevated, na slightly lower than prev, ct head w/o acute stroke but new skull lytic lesions compared to 2016.  MRI/A ordered  PMD paged to discuss.  UA pending; straight cath ordered.  Speech improved compared to earlier, pt seems to have more energy.   Per Dr Salazar, hospitalist involved during pt's last admit, pt w slow mental recovery w any insult (hyperca, infection). Pt discussed w Dr Valdez - viviana from bx w squamous cell carcinoma.  She will follow pt if admitted as consult and can fu w her in clinic.  If pt not prev involved w neuro from last admit, she requests Dr Mora. Pt in MRI.  UA w 5-10 wbc but also a contaminated specimen w epithelial cells; u cx pending.  Pt seen by hospitalist team and cm - pt has 24 hha and may be ok for dc if no acute issues.  Plan repeat bmp, await mri.  Poss dc home if improved na, ca, mental status and family amenable to dc.  Pt signed out to Dr Gomez pending mri, labs. CXR improved from prev, lactate wnl, wbc elevated, na slightly lower than prev, ct head w/o acute stroke but new skull lytic lesions compared to 5/17.  MRI/A ordered  PMD paged to discuss.  UA pending; straight cath ordered.  Speech improved compared to earlier, pt seems to have more energy.   Per Dr Salazar, hospitalist involved during pt's last admit, pt w slow mental recovery w any insult (hyperca, infection). Pt in MRI.  UA w 5-10 wbc but also a contaminated specimen w epithelial cells; u cx pending.  Pt seen by hospitalist team and cm - pt has 24 hha and may be ok for dc if no acute issues.  Plan repeat bmp, await mri.  Poss dc home if improved na, ca, mental status and family amenable to dc.  Pt discussed w Dr Valdez again - she feels ca must be < 10 for dc and recommends pt receive abx for uti if dc.  Pt signed out to Dr Gomez pending mri, labs. Mor, took over care: pt w recent admission for hyperCa, s/p tx w asp pna now w slurred speech, normal lactate, mental status/speech w improvement, head CT no acute process, cxr improved, , Ca 10.8 Mor, took over care: pt w recent admission for hyperCa, s/p tx w asp pna now w slurred speech, normal lactate, mental status/speech w improvement, head CT no acute process, cxr improved, , Ca 10.8, MRI, rpt BMP pending. Mor: noted subacute to acute infarcts on mri, discussed w stroke attending for admission.

## 2017-12-08 NOTE — H&P ADULT - PROBLEM SELECTOR PLAN 4
lytic lesion on 6th rib not suggestive of breast CA mets, rather new malignancy.   - f/u Dr. Valdez discharged on Nov 17th w/ diagnosis of CAP s/p treatment  presents to ED w/ fever 100.4, 2/4SIRS without lactate. No clear source of infection. PNA possible however CXR with improvement of effusions and infiltrate since last admission. UA not clean catch. s/p Vancomycin and Zosyn in ED  - will hold off on abx   - repeat CXR in AM  - trend WBC  - f/u blood and urine cultures

## 2017-12-08 NOTE — H&P ADULT - PROBLEM SELECTOR PLAN 2
recent hospitalization for hypercalcemia in setting of malignancy. 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 Ca normal this admission. Managed by Dr. Valdez.   - monitor electrolytes  - f/u with Dr. Valdez rec's in AM recent hospitalization for hypercalcemia in setting of malignancy. 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 Ca normal this admission. Managed by Dr. Valdez.   - no changes on telemetry/EKG  - monitor electrolytes  - f/u with Dr. Valdez rec's in AM recent hospitalization for hypercalcemia in setting of malignancy. 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 Ca normal this admission. Managed by Dr. Valdez.   -recent labs show low PTH, low PTHrP  -ionized calcium  -no changes on telemetry/EKG  -monitor electrolytes, IVF hydration   -f/u with Dr. Valdez rec's in AM

## 2017-12-08 NOTE — ED PROVIDER NOTE - OBJECTIVE STATEMENT
89 year old female former 60 PY smoker with PMHx significant for HTN, HLD, Breast CA, AAA repair, GERD, anxiety/insomnia s/p recent admit for hypercalcemia and leukocytosis noted to have lytic bone lesion and adjacent subpleural spiculated nodule in rul s/p bx 11/29(awaiting results) whose hospital course was complicated by sepsis 2/2 L sided infiltrate vs effusion suspicious for aspiration pna treated and improved w abx c/o slurred speech noted this am - last normal 7pm last night when she went to bed.  Pt denies ha, head trauma, change in vision/speech/gait, numbness or weakness in ext, h/o cva.  Med record indicated pt w speech problem when ca high on prior admit.  No cp, palpitations, uri sx, sob but some pain L lower rib/side area; no abd pain, n/v/d, dysuria, freq.  Pt unaware she had fever.  No travel, sick contacts.  + recent hospitalization.  Pt DNR/DNI

## 2017-12-08 NOTE — ED PROVIDER NOTE - PMH
Anxiety    Breast cancer in female    GERD (gastroesophageal reflux disease)    HTN (hypertension)    Hypercalcemia    Hyperlipidemia    Hypothyroid    Lung mass

## 2017-12-08 NOTE — H&P ADULT - PMH
AAA (abdominal aortic aneurysm)    Anxiety    Breast cancer in female    GERD (gastroesophageal reflux disease)    HTN (hypertension)    Hypercalcemia    Hyperlipidemia    Hypothyroid    Lung mass

## 2017-12-08 NOTE — H&P ADULT - NSHPLABSRESULTS_GEN_ALL_CORE
< from: MR Head No Cont (12.08.17 @ 18:20) >    EXAM:  MR BRAIN                        < from: MR Head No Cont (12.08.17 @ 18:20) >    INTERPRETATION:  Clinical history: slurred speech, h/o metastatic ca w   new skull lesions    Technique: MRI of the brain was performed utilizing sagittal and axial   T1, axial T2, axial gradient echo, axial and coronal FLAIR and diffusion   imaging.    There are no prior studies available for comparison.    Findings: There are scattered acute to subacute punctate infarctions in   left basal ganglia, left parietal cortex, left parietal white matter (   image #[id number]) ,right subcortical parietal lobe, right occipital cortex   (image # [id number]).     There are confluent patchy T2 and Flair signal hyperintensities   throughout within the white matter extending into the internal and   external capsules that are nonspecific. There are patchy areas of T2 and   flair signal hyperintensities within the bilateral thalami and nora.   There is no evidence of mass-effect or midline shift. There are bilateral   basal ganglia, thalamic, pontine chronic lacunar infarcts. There is no   evidence of intra or extra-axial fluid collection. The ventricles are of   normal size and caliber. There is no evidence of acute infarction.   Evaluation of the intracranial vascular flow-voids appear within normal   limits.     There is a punctate focus of gradient echo hypointensity in the right   frontal white matter. Bilateral thalami, mesial temporal lobes, bilateral   nora and cerebellar hemispheres consistent with hemosiderin from prior   microhemorrhage (trauma, blood dyscrasia, vasculitis, infarction, less   likely amyloid) or calcification. There is also a linear focus of   gradient echo hypointensity along the left frontal parietal gyrus that   may represent hemosiderinfrom prior subarachnoid hemorrhage.    There is an approximately 1.8 cm x 1.2 cm focus of T2 and FLAIR signal   hyperintensity in the high right frontal calvarium and a small epidural   soft tissue component with erosive changes of the inner cortex. There is   an approximately 1.7 cm x 1.4 cm x 2.2 cm soft tissue focus in the left   frontal calvarium with an epidural soft tissue component. Findings are   consistent with metastasis.    The visualized paranasal sinuses and bilateral mastoid air cells are   clear. There is a approximately 1 cm  focus of T2 signal hyperintensity   and hypointensity with partial T1 hyperintensity at the midline of the   nasopharynx consistent with a Tornwaldt cyst.    Impression: Multiple punctate acute to subacute infarcts suspicious for   embolic etiology or hypercoagulable state.     Nonspecific confluent foci of T2 and Flair signal hyperintensities within   the white matter; findings may be seen in patient with migraine   headaches, vasculitis, severe microvascular disease, demyelinating   disease, Lyme disease, viral illness and chemotherapy/radiation therapy.      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      Bilateralthalami, mesial temporal lobes, bilateral nora and cerebellar   hemispheres  hemosiderin from prior microhemorrhage (hypertension,   trauma, blood dyscrasia, vasculitis, infarction, less likely amyloid) or   calcification      < end of copied text >

## 2017-12-09 DIAGNOSIS — I63.9 CEREBRAL INFARCTION, UNSPECIFIED: ICD-10-CM

## 2017-12-09 DIAGNOSIS — R50.9 FEVER, UNSPECIFIED: ICD-10-CM

## 2017-12-09 DIAGNOSIS — R91.8 OTHER NONSPECIFIC ABNORMAL FINDING OF LUNG FIELD: ICD-10-CM

## 2017-12-09 DIAGNOSIS — E78.5 HYPERLIPIDEMIA, UNSPECIFIED: ICD-10-CM

## 2017-12-09 DIAGNOSIS — K21.9 GASTRO-ESOPHAGEAL REFLUX DISEASE WITHOUT ESOPHAGITIS: ICD-10-CM

## 2017-12-09 DIAGNOSIS — R63.8 OTHER SYMPTOMS AND SIGNS CONCERNING FOOD AND FLUID INTAKE: ICD-10-CM

## 2017-12-09 DIAGNOSIS — I10 ESSENTIAL (PRIMARY) HYPERTENSION: ICD-10-CM

## 2017-12-09 DIAGNOSIS — E83.52 HYPERCALCEMIA: ICD-10-CM

## 2017-12-09 DIAGNOSIS — C50.919 MALIGNANT NEOPLASM OF UNSPECIFIED SITE OF UNSPECIFIED FEMALE BREAST: ICD-10-CM

## 2017-12-09 DIAGNOSIS — Z29.9 ENCOUNTER FOR PROPHYLACTIC MEASURES, UNSPECIFIED: ICD-10-CM

## 2017-12-09 DIAGNOSIS — I71.4 ABDOMINAL AORTIC ANEURYSM, WITHOUT RUPTURE: ICD-10-CM

## 2017-12-09 LAB
ALBUMIN SERPL ELPH-MCNC: 1.8 G/DL — LOW (ref 3.3–5)
ALP SERPL-CCNC: 79 U/L — SIGNIFICANT CHANGE UP (ref 40–120)
ALT FLD-CCNC: 20 U/L — SIGNIFICANT CHANGE UP (ref 10–45)
ANION GAP SERPL CALC-SCNC: 7 MMOL/L — SIGNIFICANT CHANGE UP (ref 5–17)
AST SERPL-CCNC: 19 U/L — SIGNIFICANT CHANGE UP (ref 10–40)
BILIRUB SERPL-MCNC: 0.2 MG/DL — SIGNIFICANT CHANGE UP (ref 0.2–1.2)
BUN SERPL-MCNC: 19 MG/DL — SIGNIFICANT CHANGE UP (ref 7–23)
CALCIUM SERPL-MCNC: 10.4 MG/DL — SIGNIFICANT CHANGE UP (ref 8.4–10.5)
CHLORIDE SERPL-SCNC: 101 MMOL/L — SIGNIFICANT CHANGE UP (ref 96–108)
CO2 SERPL-SCNC: 23 MMOL/L — SIGNIFICANT CHANGE UP (ref 22–31)
CREAT SERPL-MCNC: 0.9 MG/DL — SIGNIFICANT CHANGE UP (ref 0.5–1.3)
CULTURE RESULTS: NO GROWTH — SIGNIFICANT CHANGE UP
GLUCOSE SERPL-MCNC: 86 MG/DL — SIGNIFICANT CHANGE UP (ref 70–99)
HCT VFR BLD CALC: 22.1 % — LOW (ref 34.5–45)
HCT VFR BLD CALC: 24.1 % — LOW (ref 34.5–45)
HGB BLD-MCNC: 7 G/DL — CRITICAL LOW (ref 11.5–15.5)
HGB BLD-MCNC: 7.5 G/DL — LOW (ref 11.5–15.5)
MCHC RBC-ENTMCNC: 29 PG — SIGNIFICANT CHANGE UP (ref 27–34)
MCHC RBC-ENTMCNC: 29 PG — SIGNIFICANT CHANGE UP (ref 27–34)
MCHC RBC-ENTMCNC: 31.1 G/DL — LOW (ref 32–36)
MCHC RBC-ENTMCNC: 31.7 G/DL — LOW (ref 32–36)
MCV RBC AUTO: 91.7 FL — SIGNIFICANT CHANGE UP (ref 80–100)
MCV RBC AUTO: 93.1 FL — SIGNIFICANT CHANGE UP (ref 80–100)
PLATELET # BLD AUTO: 264 K/UL — SIGNIFICANT CHANGE UP (ref 150–400)
PLATELET # BLD AUTO: 295 K/UL — SIGNIFICANT CHANGE UP (ref 150–400)
POTASSIUM SERPL-MCNC: 4.2 MMOL/L — SIGNIFICANT CHANGE UP (ref 3.5–5.3)
POTASSIUM SERPL-SCNC: 4.2 MMOL/L — SIGNIFICANT CHANGE UP (ref 3.5–5.3)
PROT SERPL-MCNC: 5.9 G/DL — LOW (ref 6–8.3)
RBC # BLD: 2.41 M/UL — LOW (ref 3.8–5.2)
RBC # BLD: 2.59 M/UL — LOW (ref 3.8–5.2)
RBC # FLD: 16.3 % — SIGNIFICANT CHANGE UP (ref 10.3–16.9)
RBC # FLD: 16.5 % — SIGNIFICANT CHANGE UP (ref 10.3–16.9)
SODIUM SERPL-SCNC: 131 MMOL/L — LOW (ref 135–145)
SPECIMEN SOURCE: SIGNIFICANT CHANGE UP
WBC # BLD: 10.3 K/UL — SIGNIFICANT CHANGE UP (ref 3.8–10.5)
WBC # BLD: 9.1 K/UL — SIGNIFICANT CHANGE UP (ref 3.8–10.5)
WBC # FLD AUTO: 10.3 K/UL — SIGNIFICANT CHANGE UP (ref 3.8–10.5)
WBC # FLD AUTO: 9.1 K/UL — SIGNIFICANT CHANGE UP (ref 3.8–10.5)

## 2017-12-09 PROCEDURE — 93306 TTE W/DOPPLER COMPLETE: CPT | Mod: 26

## 2017-12-09 PROCEDURE — 99233 SBSQ HOSP IP/OBS HIGH 50: CPT

## 2017-12-09 RX ORDER — PANTOPRAZOLE SODIUM 20 MG/1
40 TABLET, DELAYED RELEASE ORAL DAILY
Qty: 0 | Refills: 0 | Status: DISCONTINUED | OUTPATIENT
Start: 2017-12-09 | End: 2017-12-13

## 2017-12-09 RX ORDER — SODIUM CHLORIDE 9 MG/ML
1000 INJECTION INTRAMUSCULAR; INTRAVENOUS; SUBCUTANEOUS
Qty: 0 | Refills: 0 | Status: DISCONTINUED | OUTPATIENT
Start: 2017-12-09 | End: 2017-12-09

## 2017-12-09 RX ORDER — ATORVASTATIN CALCIUM 80 MG/1
80 TABLET, FILM COATED ORAL AT BEDTIME
Qty: 0 | Refills: 0 | Status: DISCONTINUED | OUTPATIENT
Start: 2017-12-09 | End: 2017-12-13

## 2017-12-09 RX ORDER — ASPIRIN/CALCIUM CARB/MAGNESIUM 324 MG
81 TABLET ORAL DAILY
Qty: 0 | Refills: 0 | Status: DISCONTINUED | OUTPATIENT
Start: 2017-12-09 | End: 2017-12-13

## 2017-12-09 RX ADMIN — Medication 50 MILLIGRAM(S): at 17:14

## 2017-12-09 RX ADMIN — PANTOPRAZOLE SODIUM 40 MILLIGRAM(S): 20 TABLET, DELAYED RELEASE ORAL at 12:18

## 2017-12-09 RX ADMIN — Medication 75 MICROGRAM(S): at 06:44

## 2017-12-09 RX ADMIN — Medication 50 MILLIGRAM(S): at 06:45

## 2017-12-09 RX ADMIN — Medication 81 MILLIGRAM(S): at 12:18

## 2017-12-09 RX ADMIN — PREGABALIN 500 MICROGRAM(S): 225 CAPSULE ORAL at 17:14

## 2017-12-09 RX ADMIN — ATORVASTATIN CALCIUM 80 MILLIGRAM(S): 80 TABLET, FILM COATED ORAL at 01:35

## 2017-12-09 RX ADMIN — ATORVASTATIN CALCIUM 80 MILLIGRAM(S): 80 TABLET, FILM COATED ORAL at 22:58

## 2017-12-09 NOTE — PHYSICAL THERAPY INITIAL EVALUATION ADULT - PERTINENT HX OF CURRENT PROBLEM, REHAB EVAL
89 yo female presents with slurred speech CTH revealed bony mets and lacunar infarcts, MRI revealed bony mets and multiple acute/subacute infarcts consistent with embolic phenomenon v. hypercoagulable state, seen for PT eval hospital day # 2

## 2017-12-09 NOTE — PHYSICAL THERAPY INITIAL EVALUATION ADULT - IMPAIRMENTS FOUND, PT EVAL
aerobic capacity/endurance/fine motor/gait, locomotion, and balance/posture/muscle strength/poor safety awareness/arousal, attention, and cognition/gross motor/ROM

## 2017-12-09 NOTE — PHYSICAL THERAPY INITIAL EVALUATION ADULT - CRITERIA FOR SKILLED THERAPEUTIC INTERVENTIONS
functional limitations in following categories/impairments found/therapy frequency/rehab potential/anticipated discharge recommendation

## 2017-12-09 NOTE — CONSULT NOTE ADULT - SUBJECTIVE AND OBJECTIVE BOX
· Subjective and Objective: 	  HOSPITAL COURSE:  89F PMH breast cancer, AAA repair, GERD, HTN, anxiety, recent admission for sepsis 2/2 aspiration pneumonia, found to have hypercalcemia and lytic bone lesion w/ associated suspicious right lung mass(s/p bx ) who presents w/ slurred speech. Pt outside the window for tPA.  Symptoms resolved on presentation NIHSS 0. 10-system ROS negative except for left rib pain  In the ED VS(T:99.1F HR:91 BP:117/72 RR:18 O2%:94 on RA ). Labs significant for WBC 14.8, H/H 8.2/25, Na 128, Ca 11.8(qing), UA w/ WBC, LE, bacteria. CTH revealed bony mets and lacunar infarcts, MRI revealed bony mets and multiple acute/subacute infarcts consistent with embolic phenomenon v. hypercoagulable state.  MRA negative.  Admitted to  for acute stroke.     INTERVAL HPI/OVERNIGHT EVENTS:    ROS  CV: Denies chest pain, palpitations  RESP: Denies SOB  GI: Denies abdominal pain, constipation, diarrhea, nausea, vomiting  : Denies dysuria, hematuria, flank or back pain  ID: Denies fevers, chills  MSK: Denies joint pain   DERM: Denies any rashes, bruising, pruritis  NEURO: No headaches, blurry vision, double vision  ENDO: Denies heat or cold intolerances, changes in weight, thinning of hair  PSYCH: Denies any mood changes    VITAL SIGNS:  T(F): 96.1 (17 @ 05:27), Max: 100.4 (17 @ 10:52)  HR: 84 (17 @ 23:29) (67 - 92)  BP: 180/84 (17 @ 23:29) (109/60 - 180/84)  RR: 16 (17 @ 23:29) (16 - 20)  SpO2: 98% (17 @ 23:29) (94% - 98%)    PHYSICAL EXAM:    Constitutional: WDWN, NAD, resting comfortably   Head: NC/AT  Eyes: PERRL, EOMI, anicteric sclera, no mucosal pallor  ENT: no nasal discharge; uvula midline, no oropharyngeal erythema or exudates; MMM  Neck: supple; no JVD or thyromegaly, no lymphadenopathy  Respiratory: CTA B/L; no W/R/R, no retractions  Cardiac: +S1/S2; RRR; no M/R/G; PMI non-displaced  Gastrointestinal: abdomen soft, NT/ND; no rebound or guarding; +BSx4  Back: spine midline, no bony tenderness or step-offs; no CVAT B/L  Extremities: warm; no calf tenderness, no pedal edema, no cyanosis, no clubbing  Musculoskeletal: NROM x4; no joint swelling, tenderness or erythema  Vascular: 2+ radial, femoral; DP/PT pulses B/L  Dermatologic: no rash, no petechia   Lymphatic: no submandibular or cervical LAD  Neurologic: AAOx3; CNII-XII grossly intact; 5/5 strength throughout, sensory symmetrically intact in B/L LE   Psychiatric: pleasant and conversive; affect and characteristics of appearance, verbalizations, behaviors are appropriate      MEDICATIONS  (STANDING):  aspirin  chewable 81 milliGRAM(s) Oral daily  atorvastatin 80 milliGRAM(s) Oral at bedtime  cyanocobalamin 500 MICROGram(s) Oral daily  levothyroxine 75 MICROGram(s) Oral daily  metoprolol     tartrate 50 milliGRAM(s) Oral two times a day  pantoprazole  Injectable 40 milliGRAM(s) IV Push daily    MEDICATIONS  (PRN):      Allergies    No Known Allergies    Intolerances        LABS:                         7.5    10.3  )-----------( 295      ( 09 Dec 2017 11:31 )             24.1                          7.0    9.1   )-----------( 264      ( 09 Dec 2017 06:39 )             22.1     12-    131<L>  |  101  |  19  ----------------------------<  86  4.2   |  23  |  0.90    Ca    10.4      09 Dec 2017 06:39    TPro  5.9<L>  /  Alb  1.8<L>  /  TBili  0.2  /  DBili  x   /  AST  19  /  ALT  20  /  AlkPhos  79  12-09    PT/INR - ( 08 Dec 2017 11:06 )   PT: 12.4 sec;   INR: 1.11          PTT - ( 08 Dec 2017 11:06 )  PTT:29.5 sec  Urinalysis Basic - ( 08 Dec 2017 14:31 )    Color: Yellow / Appearance: Clear / S.010 / pH: x  Gluc: x / Ketone: NEGATIVE  / Bili: Negative / Urobili: 0.2 E.U./dL   Blood: x / Protein: Trace mg/dL / Nitrite: NEGATIVE   Leuk Esterase: Trace / RBC: < 5 /HPF / WBC 5-10 /HPF   Sq Epi: x / Non Sq Epi: 5-10 /HPF / Bacteria: Present /HPF    RADIOLOGY & ADDITIONAL TESTS:    Culture - Blood (collected 17 @ 15:14)  Source: .Blood Blood-Peripheral  Preliminary Report (17 @ 04:01):    No growth at 12 hours    Culture - Blood (collected 17 @ 15:13)  Source: .Blood Blood-Peripheral  Preliminary Report (17 @ 04:01):    No growth at 12 hours          Assessment and Plan:   · Assessment		  89F PMH breast cancer, AAA repair, GERD, HTN, anxiety, recent admission for sepsis 2/2 aspiration pneumonia, found to have hypercalcemia and lytic bone lesion w/ associated suspicious right lung mass(s/p bx ) who presents w/ slurred speech noted this morning.       Problem/Plan - 1:  ·  Problem: CVA (cerebral vascular accident).  Plan: Slurred speech and lower extremity weakness this AM; last known normal last night (17) @ 7pm. 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 Patient only on simvastatin 10mg at home. Symptoms resolved while in ED. MRA H/N without occlusion   - NIHSS 0  - s/p  and Lipitor 80mg in ED. Will c/w ASA 81mg daily & Lipitor 80mg qhs  - echocardiogram w/ bubble showed intraatrial shunt. Will consult cards for possible closure of PFO Vs ASD  -LILY for further workup of cardioembolic etiology  -LE duplex for DVT rule out  - PT/OT  - stroke education  - DVT ppx   - telemetry monitoring   - passed bedside dysphagia, started on diet  -ASA/ statin. Slurred speech and lower extremity weakness this AM; last known normal last night (17) @ 7pm. 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 Patient only on simvastatin 10mg at home. Symptoms resolved while in ED. MRA H/N without occlusion   - NIHSS 0  - s/p  and Lipitor 80mg in ED. Will c/w ASA 81mg daily & Lipitor 80mg qhs  - echocardiogram w/ bubble showed intraatrial shunt. Will consult cards for possible closure of   - PT/OT  - stroke education  - DVT ppx   - telemetry monitoring   - passed bedside dysphagia, started on diet  -ASA/ statin      Problem/Plan - 2:  ·  Problem: Hypercalcemia.  Plan: recent hospitalization for hypercalcemia in setting of malignancy. 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 Ca normal this admission.    -recent labs show low PTH, low PTHrP  -ionized calcium  -no changes on telemetry/EKG  -monitor electrolytes, IVF hydration     # Anemia: Hb 7 this AM, 7.5m on repeat. f/u FOBT.  40 mg IV protonix started.      Problem/Plan - 3:  ·  Problem: Lung mass.  Plan: as per above  -Will consult palliative for goals of care. as per above      Problem/Plan - 4:  ·  Problem: Fever.  Plan: discharged on  w/ diagnosis of CAP s/p treatment  presents to ED w/ fever 100.4, 2/4SIRS without lactate. No clear source of infection. PNA possible however CXR with improvement of effusions and infiltrate since last admission. UA not clean catch. s/p Vancomycin and Zosyn in ED  - will hold off on abx   - repeat CXR in AM  - Leukocytosis resolved on CBC  - f/u blood and urine cultures.      Problem/Plan - 5:  ·  Problem: Squamous cell carcinoma.  Plan: lytic lesion on 6th rib not suggestive of breast CA mets, rather new malignancy.   - will further treatment options with patient and family     Problem/Plan - 6:  Problem: AAA (abdominal aortic aneurysm). Plan: BP control  stable.     Problem/Plan - 7:  ·  Problem: HTN (hypertension).  Plan: c/w home metoprolol 50mg BID.      Problem/Plan - 8:  ·  Problem: GERD (gastroesophageal reflux disease).  Plan: famotidine 20mg daily.      Problem/Plan - 9:  ·  Problem: Need for prophylactic measure.  Plan: HSQ, SCD's.      Problem/Plan - 10:  Problem: Nutrition, metabolism, and development symptoms. Plan; NPO until passed bedside dysphagia   monitor/ replete electrolytes K>4, Mg>2   IVF @ 75cc/hr     DNR/DNI

## 2017-12-09 NOTE — PHYSICAL THERAPY INITIAL EVALUATION ADULT - ADDITIONAL COMMENTS
ambulates with rollator, denies falls at home, no steps upon entry, RHD, wears glasses for near sighted and farsightedness.

## 2017-12-09 NOTE — PHYSICAL THERAPY INITIAL EVALUATION ADULT - MANUAL MUSCLE TESTING RESULTS, REHAB EVAL
B UE and B LE >3+/5 upon functional assessment against gravity. unable to foramlly assess due to inability to follow multi step commands

## 2017-12-09 NOTE — PHYSICAL THERAPY INITIAL EVALUATION ADULT - TRANSFER SAFETY CONCERNS NOTED: SIT/STAND, REHAB EVAL
decreased step length/losing balance/decreased safety awareness/decreased sequencing ability/stepping too close to front of assistive device/decreased balance during turns/decreased weight-shifting ability

## 2017-12-09 NOTE — PATIENT PROFILE ADULT. - TEACHING/LEARNING LEARNING PREFERENCES
pictorial/audio Consent (Marginal Mandibular)/Introductory Paragraph: The rationale for Mohs was explained to the patient and consent was obtained. The risks, benefits and alternatives to therapy were discussed in detail. Specifically, the risks of damage to the marginal mandibular branch of the facial nerve, infection, scarring, bleeding, prolonged wound healing, incomplete removal, allergy to anesthesia, and recurrence were addressed. Prior to the procedure, the treatment site was clearly identified and confirmed by the patient. All components of Universal Protocol/PAUSE Rule completed.

## 2017-12-09 NOTE — PROGRESS NOTE ADULT - PROBLEM SELECTOR PLAN 1
Slurred speech and lower extremity weakness this AM; last known normal last night (12/08/17) @ 7pm. 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 Patient only on simvastatin 10mg at home. Symptoms resolved while in ED. MRA H/N without occlusion   - NIHSS 0  - s/p  and Lipitor 80mg in ED. Will c/w ASA 81mg daily & Lipitor 80mg qhs  - echocardiogram w/ bubble study   - PT/OT  - stroke education  - DVT ppx   - telemetry monitoring   - passed bedside dysphagia, started on diet Slurred speech and lower extremity weakness this AM; last known normal last night (12/08/17) @ 7pm. 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 Patient only on simvastatin 10mg at home. Symptoms resolved while in ED. MRA H/N without occlusion   - NIHSS 0  - s/p  and Lipitor 80mg in ED. Will c/w ASA 81mg daily & Lipitor 80mg qhs  - echocardiogram w/ bubble showed intraatrial shunt. Will consult cards for possible closure of   - PT/OT  - stroke education  - DVT ppx   - telemetry monitoring   - passed bedside dysphagia, started on diet  -ASA/ statin Slurred speech and lower extremity weakness this AM; last known normal last night (12/08/17) @ 7pm. 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 Patient only on simvastatin 10mg at home. Symptoms resolved while in ED. MRA H/N without occlusion   - NIHSS 0  - s/p  and Lipitor 80mg in ED. Will c/w ASA 81mg daily & Lipitor 80mg qhs  - echocardiogram w/ bubble showed intraatrial shunt. Will consult cards for possible closure of PFO Vs ASD  -LILY for further workup of cardioembolic etiology  -LE duplex for DVT rule out  - PT/OT  - stroke education  - DVT ppx   - telemetry monitoring   - passed bedside dysphagia, started on diet  -ASA/ statin Slurred speech and lower extremity weakness this AM; last known normal last night (12/08/17) @ 7pm. 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 Patient only on simvastatin 10mg at home. Symptoms resolved while in ED. MRA H/N without occlusion   - NIHSS 0  - s/p  and Lipitor 80mg in ED. Will c/w ASA 81mg daily & Lipitor 80mg qhs  - echocardiogram w/ bubble showed intraatrial shunt. Will consider consult cards for possible closure of PFO Vs ASD after goals of care discussion with family  -LILY for further workup of cardioembolic etiology  -LE duplex for DVT rule out  - PT/OT  - stroke education  - DVT ppx   - telemetry monitoring   - passed bedside dysphagia, started on diet  -ASA/ statin

## 2017-12-09 NOTE — PHYSICAL THERAPY INITIAL EVALUATION ADULT - PLANNED THERAPY INTERVENTIONS, PT EVAL
transfer training/gait training/neuromuscular re-education/postural re-education/strengthening/ROM/stretching/balance training/bed mobility training

## 2017-12-09 NOTE — PROGRESS NOTE ADULT - PROBLEM SELECTOR PLAN 2
recent hospitalization for hypercalcemia in setting of malignancy. 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 Ca normal this admission. Managed by Dr. Valdez.   -recent labs show low PTH, low PTHrP  -ionized calcium  -no changes on telemetry/EKG  -monitor electrolytes, IVF hydration   -f/u with Dr. Valdez rec's in AM    # Anemia: Hb 7 this AM. Will repeat CBC, check FOBT. recent hospitalization for hypercalcemia in setting of malignancy. 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 Ca normal this admission. Managed by Dr. Valdez.   -recent labs show low PTH, low PTHrP  -ionized calcium  -no changes on telemetry/EKG  -monitor electrolytes, IVF hydration   -f/u with Dr. Valdez rec's in AM    # Anemia: Hb 7 this AM, 7.5m on repeat. f/u FOBT.  40 mg IV protonix started recent hospitalization for hypercalcemia in setting of malignancy. 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 Ca normal this admission. Managed by Dr. Valdez.   -recent labs show low PTH, low PTHrP  -ionized calcium  -no changes on telemetry/EKG  -monitor electrolytes, IVF hydration   -f/u with Dr. Valdez rec's in AM    # Anemia: Hb 7 this AM, 7.5m on repeat. f/u FOBT.  40 mg IV protonix PO daily started

## 2017-12-09 NOTE — PROGRESS NOTE ADULT - SUBJECTIVE AND OBJECTIVE BOX
HOSPITAL COURSE:  89F PMH breast cancer, AAA repair, GERD, HTN, anxiety, recent admission for sepsis 2/2 aspiration pneumonia, found to have hypercalcemia and lytic bone lesion w/ associated suspicious right lung mass(s/p bx ) who presents w/ slurred speech. Pt outside the window for tPA.  Symptoms resolved on presentation NIHSS 0. 10-system ROS negative except for left rib pain  In the ED VS(T:99.1F HR:91 BP:117/72 RR:18 O2%:94 on RA ). Labs significant for WBC 14.8, H/H 8.2/25, Na 128, Ca 11.8(qing), UA w/ WBC, LE, bacteria. CTH revealed bony mets and lacunar infarcts, MRI revealed bony mets and multiple acute/subacute infarcts consistent with embolic phenomenon v. hypercoagulable state.  MRA negative.  Admitted to  for acute stroke.     INTERVAL HPI/OVERNIGHT EVENTS:    ROS  CV: Denies chest pain, palpitations  RESP: Denies SOB  GI: Denies abdominal pain, constipation, diarrhea, nausea, vomiting  : Denies dysuria, hematuria, flank or back pain  ID: Denies fevers, chills  MSK: Denies joint pain   DERM: Denies any rashes, bruising, pruritis  NEURO: No headaches, blurry vision, double vision  ENDO: Denies heat or cold intolerances, changes in weight, thinning of hair  PSYCH: Denies any mood changes    VITAL SIGNS:  T(F): 96.1 (17 @ 05:27), Max: 100.4 (17 @ 10:52)  HR: 84 (17 @ 23:29) (67 - 92)  BP: 180/84 (17 @ 23:29) (109/60 - 180/84)  RR: 16 (17 @ 23:29) (16 - 20)  SpO2: 98% (17 @ 23:29) (94% - 98%)    PHYSICAL EXAM:    Constitutional: WDWN, NAD, resting comfortably   Head: NC/AT  Eyes: PERRL, EOMI, anicteric sclera, no mucosal pallor  ENT: no nasal discharge; uvula midline, no oropharyngeal erythema or exudates; MMM  Neck: supple; no JVD or thyromegaly, no lymphadenopathy  Respiratory: CTA B/L; no W/R/R, no retractions  Cardiac: +S1/S2; RRR; no M/R/G; PMI non-displaced  Gastrointestinal: abdomen soft, NT/ND; no rebound or guarding; +BSx4  Back: spine midline, no bony tenderness or step-offs; no CVAT B/L  Extremities: warm; no calf tenderness, no pedal edema, no cyanosis, no clubbing  Musculoskeletal: NROM x4; no joint swelling, tenderness or erythema  Vascular: 2+ radial, femoral; DP/PT pulses B/L  Dermatologic: no rash, no petechia   Lymphatic: no submandibular or cervical LAD  Neurologic: AAOx3; CNII-XII grossly intact; 5/5 strength throughout, sensory symmetrically intact in B/L LE   Psychiatric: pleasant and conversive; affect and characteristics of appearance, verbalizations, behaviors are appropriate      MEDICATIONS  (STANDING):  aspirin  chewable 81 milliGRAM(s) Oral daily  atorvastatin 80 milliGRAM(s) Oral at bedtime  cyanocobalamin 500 MICROGram(s) Oral daily  levothyroxine 75 MICROGram(s) Oral daily  metoprolol     tartrate 50 milliGRAM(s) Oral two times a day  pantoprazole  Injectable 40 milliGRAM(s) IV Push daily    MEDICATIONS  (PRN):      Allergies    No Known Allergies    Intolerances        LABS:                        7.0    9.1   )-----------( 264      ( 09 Dec 2017 06:39 )             22.1     12-    131<L>  |  101  |  19  ----------------------------<  86  4.2   |  23  |  0.90    Ca    10.4      09 Dec 2017 06:39    TPro  5.9<L>  /  Alb  1.8<L>  /  TBili  0.2  /  DBili  x   /  AST  19  /  ALT  20  /  AlkPhos  79  12-    PT/INR - ( 08 Dec 2017 11:06 )   PT: 12.4 sec;   INR: 1.11          PTT - ( 08 Dec 2017 11:06 )  PTT:29.5 sec  Urinalysis Basic - ( 08 Dec 2017 14:31 )    Color: Yellow / Appearance: Clear / S.010 / pH: x  Gluc: x / Ketone: NEGATIVE  / Bili: Negative / Urobili: 0.2 E.U./dL   Blood: x / Protein: Trace mg/dL / Nitrite: NEGATIVE   Leuk Esterase: Trace / RBC: < 5 /HPF / WBC 5-10 /HPF   Sq Epi: x / Non Sq Epi: 5-10 /HPF / Bacteria: Present /HPF    RADIOLOGY & ADDITIONAL TESTS:    Culture - Blood (collected 17 @ 15:14)  Source: .Blood Blood-Peripheral  Preliminary Report (17 @ 04:01):    No growth at 12 hours    Culture - Blood (collected 17 @ 15:13)  Source: .Blood Blood-Peripheral  Preliminary Report (17 @ 04:01):    No growth at 12 hours HOSPITAL COURSE:  89F PMH breast cancer, AAA repair, GERD, HTN, anxiety, recent admission for sepsis 2/2 aspiration pneumonia, found to have hypercalcemia and lytic bone lesion w/ associated suspicious right lung mass(s/p bx ) who presents w/ slurred speech. Pt outside the window for tPA.  Symptoms resolved on presentation NIHSS 0. 10-system ROS negative except for left rib pain  In the ED VS(T:99.1F HR:91 BP:117/72 RR:18 O2%:94 on RA ). Labs significant for WBC 14.8, H/H 8.2/25, Na 128, Ca 11.8(qing), UA w/ WBC, LE, bacteria. CTH revealed bony mets and lacunar infarcts, MRI revealed bony mets and multiple acute/subacute infarcts consistent with embolic phenomenon v. hypercoagulable state.  MRA negative.  Admitted to  for acute stroke.     INTERVAL HPI/OVERNIGHT EVENTS:    ROS  CV: Denies chest pain, palpitations  RESP: Denies SOB  GI: Denies abdominal pain, constipation, diarrhea, nausea, vomiting  : Denies dysuria, hematuria, flank or back pain  ID: Denies fevers, chills  MSK: Denies joint pain   DERM: Denies any rashes, bruising, pruritis  NEURO: No headaches, blurry vision, double vision  ENDO: Denies heat or cold intolerances, changes in weight, thinning of hair  PSYCH: Denies any mood changes    VITAL SIGNS:  T(F): 96.1 (17 @ 05:27), Max: 100.4 (17 @ 10:52)  HR: 84 (17 @ 23:29) (67 - 92)  BP: 180/84 (17 @ 23:29) (109/60 - 180/84)  RR: 16 (17 @ 23:29) (16 - 20)  SpO2: 98% (17 @ 23:29) (94% - 98%)    PHYSICAL EXAM:    Constitutional: WDWN, NAD, resting comfortably   Head: NC/AT  Eyes: PERRL, EOMI, anicteric sclera, no mucosal pallor  ENT: no nasal discharge; uvula midline, no oropharyngeal erythema or exudates; MMM  Neck: supple; no JVD or thyromegaly, no lymphadenopathy  Respiratory: CTA B/L; no W/R/R, no retractions  Cardiac: +S1/S2; RRR; no M/R/G; PMI non-displaced  Gastrointestinal: abdomen soft, NT/ND; no rebound or guarding; +BSx4  Back: spine midline, no bony tenderness or step-offs; no CVAT B/L  Extremities: warm; no calf tenderness, no pedal edema, no cyanosis, no clubbing  Musculoskeletal: NROM x4; no joint swelling, tenderness or erythema  Vascular: 2+ radial, femoral; DP/PT pulses B/L  Dermatologic: no rash, no petechia   Lymphatic: no submandibular or cervical LAD  Neurologic: AAOx3; CNII-XII grossly intact; 5/5 strength throughout, sensory symmetrically intact in B/L LE   Psychiatric: pleasant and conversive; affect and characteristics of appearance, verbalizations, behaviors are appropriate      MEDICATIONS  (STANDING):  aspirin  chewable 81 milliGRAM(s) Oral daily  atorvastatin 80 milliGRAM(s) Oral at bedtime  cyanocobalamin 500 MICROGram(s) Oral daily  levothyroxine 75 MICROGram(s) Oral daily  metoprolol     tartrate 50 milliGRAM(s) Oral two times a day  pantoprazole  Injectable 40 milliGRAM(s) IV Push daily    MEDICATIONS  (PRN):      Allergies    No Known Allergies    Intolerances        LABS:                         7.5    10.3  )-----------( 295      ( 09 Dec 2017 11:31 )             24.1                          7.0    9.1   )-----------( 264      ( 09 Dec 2017 06:39 )             22.1     12    131<L>  |  101  |  19  ----------------------------<  86  4.2   |  23  |  0.90    Ca    10.4      09 Dec 2017 06:39    TPro  5.9<L>  /  Alb  1.8<L>  /  TBili  0.2  /  DBili  x   /  AST  19  /  ALT  20  /  AlkPhos  79  12-09    PT/INR - ( 08 Dec 2017 11:06 )   PT: 12.4 sec;   INR: 1.11          PTT - ( 08 Dec 2017 11:06 )  PTT:29.5 sec  Urinalysis Basic - ( 08 Dec 2017 14:31 )    Color: Yellow / Appearance: Clear / S.010 / pH: x  Gluc: x / Ketone: NEGATIVE  / Bili: Negative / Urobili: 0.2 E.U./dL   Blood: x / Protein: Trace mg/dL / Nitrite: NEGATIVE   Leuk Esterase: Trace / RBC: < 5 /HPF / WBC 5-10 /HPF   Sq Epi: x / Non Sq Epi: 5-10 /HPF / Bacteria: Present /HPF    RADIOLOGY & ADDITIONAL TESTS:    Culture - Blood (collected 17 @ 15:14)  Source: .Blood Blood-Peripheral  Preliminary Report (17 @ 04:01):    No growth at 12 hours    Culture - Blood (collected 17 @ 15:13)  Source: .Blood Blood-Peripheral  Preliminary Report (17 @ 04:01):    No growth at 12 hours

## 2017-12-09 NOTE — PROGRESS NOTE ADULT - PROBLEM SELECTOR PLAN 4
discharged on Nov 17th w/ diagnosis of CAP s/p treatment  presents to ED w/ fever 100.4, 2/4SIRS without lactate. No clear source of infection. PNA possible however CXR with improvement of effusions and infiltrate since last admission. UA not clean catch. s/p Vancomycin and Zosyn in ED  - will hold off on abx   - repeat CXR in AM  - Leukocytosis resolved on CBC  - f/u blood and urine cultures

## 2017-12-09 NOTE — PHYSICAL THERAPY INITIAL EVALUATION ADULT - MODALITIES TREATMENT COMMENTS
visual fields full, EOMI, negative nystagmus, facial sensation intact, facial muscles symmetrical, tongue midline hearing impaired bilateral, shoulder shrug/SCM intact

## 2017-12-10 DIAGNOSIS — J18.9 PNEUMONIA, UNSPECIFIED ORGANISM: ICD-10-CM

## 2017-12-10 LAB
ANION GAP SERPL CALC-SCNC: 9 MMOL/L — SIGNIFICANT CHANGE UP (ref 5–17)
BUN SERPL-MCNC: 17 MG/DL — SIGNIFICANT CHANGE UP (ref 7–23)
CA-I BLD-SCNC: 1.52 MMOL/L — HIGH (ref 1.12–1.3)
CALCIUM SERPL-MCNC: 10.4 MG/DL — SIGNIFICANT CHANGE UP (ref 8.4–10.5)
CHLORIDE SERPL-SCNC: 98 MMOL/L — SIGNIFICANT CHANGE UP (ref 96–108)
CO2 SERPL-SCNC: 22 MMOL/L — SIGNIFICANT CHANGE UP (ref 22–31)
CREAT SERPL-MCNC: 0.88 MG/DL — SIGNIFICANT CHANGE UP (ref 0.5–1.3)
GLUCOSE SERPL-MCNC: 174 MG/DL — HIGH (ref 70–99)
HCT VFR BLD CALC: 22.6 % — LOW (ref 34.5–45)
HGB BLD-MCNC: 7.1 G/DL — LOW (ref 11.5–15.5)
MCHC RBC-ENTMCNC: 28.9 PG — SIGNIFICANT CHANGE UP (ref 27–34)
MCHC RBC-ENTMCNC: 31.4 G/DL — LOW (ref 32–36)
MCV RBC AUTO: 91.9 FL — SIGNIFICANT CHANGE UP (ref 80–100)
PLATELET # BLD AUTO: 277 K/UL — SIGNIFICANT CHANGE UP (ref 150–400)
POTASSIUM SERPL-MCNC: 3.8 MMOL/L — SIGNIFICANT CHANGE UP (ref 3.5–5.3)
POTASSIUM SERPL-SCNC: 3.8 MMOL/L — SIGNIFICANT CHANGE UP (ref 3.5–5.3)
RBC # BLD: 2.46 M/UL — LOW (ref 3.8–5.2)
RBC # FLD: 16.2 % — SIGNIFICANT CHANGE UP (ref 10.3–16.9)
SODIUM SERPL-SCNC: 129 MMOL/L — LOW (ref 135–145)
WBC # BLD: 9.8 K/UL — SIGNIFICANT CHANGE UP (ref 3.8–10.5)
WBC # FLD AUTO: 9.8 K/UL — SIGNIFICANT CHANGE UP (ref 3.8–10.5)

## 2017-12-10 PROCEDURE — 99233 SBSQ HOSP IP/OBS HIGH 50: CPT

## 2017-12-10 PROCEDURE — 71010: CPT | Mod: 26

## 2017-12-10 RX ORDER — PIPERACILLIN AND TAZOBACTAM 4; .5 G/20ML; G/20ML
3.38 INJECTION, POWDER, LYOPHILIZED, FOR SOLUTION INTRAVENOUS EVERY 6 HOURS
Qty: 0 | Refills: 0 | Status: DISCONTINUED | OUTPATIENT
Start: 2017-12-10 | End: 2017-12-13

## 2017-12-10 RX ORDER — SODIUM CHLORIDE 9 MG/ML
1000 INJECTION INTRAMUSCULAR; INTRAVENOUS; SUBCUTANEOUS
Qty: 0 | Refills: 0 | Status: DISCONTINUED | OUTPATIENT
Start: 2017-12-10 | End: 2017-12-12

## 2017-12-10 RX ORDER — SODIUM CHLORIDE 9 MG/ML
250 INJECTION INTRAMUSCULAR; INTRAVENOUS; SUBCUTANEOUS ONCE
Qty: 0 | Refills: 0 | Status: COMPLETED | OUTPATIENT
Start: 2017-12-10 | End: 2017-12-10

## 2017-12-10 RX ORDER — ACETAMINOPHEN 500 MG
650 TABLET ORAL EVERY 6 HOURS
Qty: 0 | Refills: 0 | Status: DISCONTINUED | OUTPATIENT
Start: 2017-12-10 | End: 2017-12-13

## 2017-12-10 RX ORDER — VANCOMYCIN HCL 1 G
750 VIAL (EA) INTRAVENOUS EVERY 24 HOURS
Qty: 0 | Refills: 0 | Status: DISCONTINUED | OUTPATIENT
Start: 2017-12-11 | End: 2017-12-11

## 2017-12-10 RX ORDER — VANCOMYCIN HCL 1 G
VIAL (EA) INTRAVENOUS
Qty: 0 | Refills: 0 | Status: DISCONTINUED | OUTPATIENT
Start: 2017-12-10 | End: 2017-12-11

## 2017-12-10 RX ORDER — VANCOMYCIN HCL 1 G
750 VIAL (EA) INTRAVENOUS ONCE
Qty: 0 | Refills: 0 | Status: COMPLETED | OUTPATIENT
Start: 2017-12-10 | End: 2017-12-10

## 2017-12-10 RX ADMIN — Medication 650 MILLIGRAM(S): at 23:24

## 2017-12-10 RX ADMIN — Medication 50 MILLIGRAM(S): at 06:06

## 2017-12-10 RX ADMIN — Medication 250 MILLIGRAM(S): at 04:30

## 2017-12-10 RX ADMIN — PANTOPRAZOLE SODIUM 40 MILLIGRAM(S): 20 TABLET, DELAYED RELEASE ORAL at 11:40

## 2017-12-10 RX ADMIN — Medication 81 MILLIGRAM(S): at 11:40

## 2017-12-10 RX ADMIN — SODIUM CHLORIDE 60 MILLILITER(S): 9 INJECTION INTRAMUSCULAR; INTRAVENOUS; SUBCUTANEOUS at 04:00

## 2017-12-10 RX ADMIN — Medication 75 MICROGRAM(S): at 06:06

## 2017-12-10 RX ADMIN — SODIUM CHLORIDE 1000 MILLILITER(S): 9 INJECTION INTRAMUSCULAR; INTRAVENOUS; SUBCUTANEOUS at 03:00

## 2017-12-10 RX ADMIN — PIPERACILLIN AND TAZOBACTAM 25 GRAM(S): 4; .5 INJECTION, POWDER, LYOPHILIZED, FOR SOLUTION INTRAVENOUS at 04:05

## 2017-12-10 RX ADMIN — SODIUM CHLORIDE 60 MILLILITER(S): 9 INJECTION INTRAMUSCULAR; INTRAVENOUS; SUBCUTANEOUS at 21:22

## 2017-12-10 RX ADMIN — ATORVASTATIN CALCIUM 80 MILLIGRAM(S): 80 TABLET, FILM COATED ORAL at 21:53

## 2017-12-10 RX ADMIN — PREGABALIN 500 MICROGRAM(S): 225 CAPSULE ORAL at 11:40

## 2017-12-10 RX ADMIN — PIPERACILLIN AND TAZOBACTAM 200 GRAM(S): 4; .5 INJECTION, POWDER, LYOPHILIZED, FOR SOLUTION INTRAVENOUS at 17:41

## 2017-12-10 RX ADMIN — Medication 50 MILLIGRAM(S): at 17:41

## 2017-12-10 RX ADMIN — Medication 650 MILLIGRAM(S): at 06:19

## 2017-12-10 RX ADMIN — PIPERACILLIN AND TAZOBACTAM 200 GRAM(S): 4; .5 INJECTION, POWDER, LYOPHILIZED, FOR SOLUTION INTRAVENOUS at 11:39

## 2017-12-10 RX ADMIN — PIPERACILLIN AND TAZOBACTAM 200 GRAM(S): 4; .5 INJECTION, POWDER, LYOPHILIZED, FOR SOLUTION INTRAVENOUS at 23:24

## 2017-12-10 NOTE — PROGRESS NOTE ADULT - PROBLEM SELECTOR PLAN 3
as per above  -Will consult palliative for goals of care recent hospitalization for hypercalcemia in setting of malignancy. 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 Ca normal this admission. Managed by Dr. Valdez.   -recent labs show low PTH, low PTHrP  -ionized calcium  -no changes on telemetry/EKG  -monitor electrolytes, IVF hydration   -f/u with Dr. Valdez rec's in AM    # Anemia: Hb 7 this AM, 7.5m on repeat. f/u FOBT.  40 mg IV protonix PO daily started

## 2017-12-10 NOTE — PROGRESS NOTE ADULT - PROBLEM SELECTOR PLAN 10
NPO until passed bedside dysphagia   monitor/ replete electrolytes K>4, Mg>2   IVF @ 75cc/hr     DNR/DNI    Dispo: 7 [facility] NPO until passed bedside dysphagia   monitor/ replete electrolytes K>4, Mg>2   IVF @ 60cc/hr     DNR/DNI    Dispo: 7 [facility]

## 2017-12-10 NOTE — PROGRESS NOTE ADULT - SUBJECTIVE AND OBJECTIVE BOX
OVERNIGHT EVENTS:    SUBJECTIVE / INTERVAL HPI: Patient seen and examined at bedside.     VITAL SIGNS:  Vital Signs Last 24 Hrs  T(C): 35.9 (10 Dec 2017 06:36), Max: 38.8 (10 Dec 2017 02:40)  T(F): 96.7 (10 Dec 2017 06:36), Max: 101.8 (10 Dec 2017 02:40)  HR: 76 (10 Dec 2017 05:00) (68 - 115)  BP: 118/66 (10 Dec 2017 05:00) (118/59 - 187/79)  BP(mean): 86 (10 Dec 2017 05:00) (86 - 118)  RR: 18 (10 Dec 2017 05:00) (14 - 18)  SpO2: 97% (10 Dec 2017 05:00) (92% - 100%)    PHYSICAL EXAM:    General: WDWN  HEENT: NC/AT; PERRL, anicteric sclera; MMM  Neck: supple  Cardiovascular: +S1/S2; RRR  Respiratory: CTA B/L; no W/R/R  Gastrointestinal: soft, NT/ND; +BSx4  Extremities: WWP; no edema, clubbing or cyanosis  Vascular: 2+ radial, DP/PT pulses B/L  Neurological: AAOx3; no focal deficits    MEDICATIONS:  MEDICATIONS  (STANDING):  aspirin  chewable 81 milliGRAM(s) Oral daily  atorvastatin 80 milliGRAM(s) Oral at bedtime  cyanocobalamin 500 MICROGram(s) Oral daily  levothyroxine 75 MICROGram(s) Oral daily  metoprolol     tartrate 50 milliGRAM(s) Oral two times a day  pantoprazole  Injectable 40 milliGRAM(s) IV Push daily  piperacillin/tazobactam IVPB. 3.375 Gram(s) IV Intermittent every 6 hours  sodium chloride 0.9%. 1000 milliLiter(s) (60 mL/Hr) IV Continuous <Continuous>  vancomycin  IVPB        MEDICATIONS  (PRN):  acetaminophen   Tablet 650 milliGRAM(s) Oral every 6 hours PRN For Temp greater than 38 C (100.4 F)      ALLERGIES:  Allergies    No Known Allergies    Intolerances        LABS:                        7.5    10.3  )-----------( 295      ( 09 Dec 2017 11:31 )             24.1     12-09    131<L>  |  101  |  19  ----------------------------<  86  4.2   |  23  |  0.90    Ca    10.4      09 Dec 2017 06:39    TPro  5.9<L>  /  Alb  1.8<L>  /  TBili  0.2  /  DBili  x   /  AST  19  /  ALT  20  /  AlkPhos  79  12-09    PT/INR - ( 08 Dec 2017 11:06 )   PT: 12.4 sec;   INR: 1.11          PTT - ( 08 Dec 2017 11:06 )  PTT:29.5 sec  Urinalysis Basic - ( 10 Dec 2017 03:56 )    Color: Yellow / Appearance: SL Cloudy / S.010 / pH: x  Gluc: x / Ketone: NEGATIVE  / Bili: Negative / Urobili: 0.2 E.U./dL   Blood: x / Protein: Trace mg/dL / Nitrite: NEGATIVE   Leuk Esterase: NEGATIVE / RBC: < 5 /HPF / WBC < 5 /HPF   Sq Epi: x / Non Sq Epi: 5-10 /HPF / Bacteria: Many /HPF      CAPILLARY BLOOD GLUCOSE      POCT Blood Glucose.: 145 mg/dL (08 Dec 2017 10:44)      RADIOLOGY & ADDITIONAL TESTS: Reviewed.    ASSESSMENT:    PLAN: OVERNIGHT EVENTS: T 101 rectally. CXR showing worsening infiltrate of left lung. BCx, UA, UCx sent. Started on vanc and zosyn.     SUBJECTIVE / INTERVAL HPI: Patient seen and examined at bedside. Denies fevers/chills. No cough. No further episodes of speech difficulties. ROS negative.     VITAL SIGNS:  Vital Signs Last 24 Hrs  T(C): 35.9 (10 Dec 2017 06:36), Max: 38.8 (10 Dec 2017 02:40)  T(F): 96.7 (10 Dec 2017 06:36), Max: 101.8 (10 Dec 2017 02:40)  HR: 76 (10 Dec 2017 05:00) (68 - 115)  BP: 118/66 (10 Dec 2017 05:00) (118/59 - 187/79)  BP(mean): 86 (10 Dec 2017 05:00) (86 - 118)  RR: 18 (10 Dec 2017 05:00) (14 - 18)  SpO2: 97% (10 Dec 2017 05:00) (92% - 100%)    PHYSICAL EXAM:    Constitutional: WDWN, NAD, resting comfortably   Head: NC/AT  Eyes: PERRL, EOMI, anicteric sclera, no mucosal pallor  ENT: no nasal discharge; uvula midline, no oropharyngeal erythema or exudates; MMM  Neck: supple; no JVD or thyromegaly, no lymphadenopathy  Respiratory: CTA B/L; no W/R/R, no retractions  Cardiac: +S1/S2; RRR; no M/R/G; PMI non-displaced  Gastrointestinal: abdomen soft, NT/ND; no rebound or guarding; +BSx4  Back: spine midline, no bony tenderness or step-offs; no CVAT B/L  Extremities: warm; no calf tenderness, no pedal edema, no cyanosis, no clubbing  Musculoskeletal: NROM x4; no joint swelling, tenderness or erythema  Vascular: 2+ radial, femoral; DP/PT pulses B/L  Dermatologic: no rash, no petechia   Lymphatic: no submandibular or cervical LAD  Neurologic: AAOx3; CNII-XII grossly intact; 5/5 strength throughout, sensory symmetrically intact in B/L LE   Psychiatric: pleasant and conversive; affect and characteristics of appearance, verbalizations, behaviors are appropriate    MEDICATIONS:  MEDICATIONS  (STANDING):  aspirin  chewable 81 milliGRAM(s) Oral daily  atorvastatin 80 milliGRAM(s) Oral at bedtime  cyanocobalamin 500 MICROGram(s) Oral daily  levothyroxine 75 MICROGram(s) Oral daily  metoprolol     tartrate 50 milliGRAM(s) Oral two times a day  pantoprazole  Injectable 40 milliGRAM(s) IV Push daily  piperacillin/tazobactam IVPB. 3.375 Gram(s) IV Intermittent every 6 hours  sodium chloride 0.9%. 1000 milliLiter(s) (60 mL/Hr) IV Continuous <Continuous>  vancomycin  IVPB        MEDICATIONS  (PRN):  acetaminophen   Tablet 650 milliGRAM(s) Oral every 6 hours PRN For Temp greater than 38 C (100.4 F)      ALLERGIES:  Allergies    No Known Allergies    Intolerances        LABS:                        7.5    10.3  )-----------( 295      ( 09 Dec 2017 11:31 )             24.1     12-    131<L>  |  101  |  19  ----------------------------<  86  4.2   |  23  |  0.90    Ca    10.4      09 Dec 2017 06:39    TPro  5.9<L>  /  Alb  1.8<L>  /  TBili  0.2  /  DBili  x   /  AST  19  /  ALT  20  /  AlkPhos  79  12-09    PT/INR - ( 08 Dec 2017 11:06 )   PT: 12.4 sec;   INR: 1.11          PTT - ( 08 Dec 2017 11:06 )  PTT:29.5 sec  Urinalysis Basic - ( 10 Dec 2017 03:56 )    Color: Yellow / Appearance: SL Cloudy / S.010 / pH: x  Gluc: x / Ketone: NEGATIVE  / Bili: Negative / Urobili: 0.2 E.U./dL   Blood: x / Protein: Trace mg/dL / Nitrite: NEGATIVE   Leuk Esterase: NEGATIVE / RBC: < 5 /HPF / WBC < 5 /HPF   Sq Epi: x / Non Sq Epi: 5-10 /HPF / Bacteria: Many /HPF      CAPILLARY BLOOD GLUCOSE      POCT Blood Glucose.: 145 mg/dL (08 Dec 2017 10:44)      RADIOLOGY & ADDITIONAL TESTS: Reviewed.    ASSESSMENT:    PLAN:

## 2017-12-10 NOTE — PROGRESS NOTE ADULT - SUBJECTIVE AND OBJECTIVE BOX
· Subjective and Objective: 	  HOSPITAL COURSE:  89F PMH breast cancer, AAA repair, GERD, HTN, anxiety, recent admission for sepsis 2/2 aspiration pneumonia, found to have hypercalcemia and lytic bone lesion w/ associated suspicious right lung mass(s/p bx 11/29) who presents w/ slurred speech. Pt outside the window for tPA.  Symptoms resolved on presentation NIHSS 0. 10-system ROS negative except for left rib pain  In the ED VS(T:99.1F HR:91 BP:117/72 RR:18 O2%:94 on RA ). Labs significant for WBC 14.8, H/H 8.2/25, Na 128, Ca 11.8(qing), UA w/ WBC, LE, bacteria. CTH revealed bony mets and lacunar infarcts, MRI revealed bony mets and multiple acute/subacute infarcts consistent with embolic phenomenon v. hypercoagulable state.  MRA negative.  Admitted to  for acute stroke.     INTERVAL HPI/OVERNIGHT EVENTS:    ROS  CV: Denies chest pain, palpitations  RESP: Denies SOB  GI: Denies abdominal pain, constipation, diarrhea, nausea, vomiting  : Denies dysuria, hematuria, flank or back pain  ID: Denies fevers, chills  MSK: Denies joint pain   DERM: Denies any rashes, bruising, pruritis  NEURO: No headaches, blurry vision, double vision  ENDO: Denies heat or cold intolerances, changes in weight, thinning of hair  PSYCH: Denies any mood changes    VITAL SIGNS:  Vital Signs Last 24 Hrs  T(C): 36.3 (10 Dec 2017 13:31), Max: 38.8 (10 Dec 2017 02:40)  T(F): 97.3 (10 Dec 2017 13:31), Max: 101.8 (10 Dec 2017 02:40)  HR: 66 (10 Dec 2017 11:56) (56 - 115)  BP: 117/59 (10 Dec 2017 11:56) (101/59 - 168/83)  BP(mean): 85 (10 Dec 2017 11:56) (77 - 118)  RR: 17 (10 Dec 2017 11:56) (17 - 18)  SpO2: 98% (10 Dec 2017 11:56) (92% - 100%)    PHYSICAL EXAM:  Constitutional: WDWN, NAD, resting comfortably   Head: NC/AT  Eyes: PERRL, EOMI, anicteric sclera, no mucosal pallor  ENT: no nasal discharge; uvula midline, no oropharyngeal erythema or exudates; MMM  Neck: supple; no JVD or thyromegaly, no lymphadenopathy  Respiratory: CTA B/L; no W/R/R, no retractions  Cardiac: +S1/S2; RRR; no M/R/G; PMI non-displaced  Gastrointestinal: abdomen soft, NT/ND; no rebound or guarding; +BSx4  Back: spine midline, no bony tenderness or step-offs; no CVAT B/L  Extremities: warm; no calf tenderness, no pedal edema, no cyanosis, no clubbing  Musculoskeletal: NROM x4; no joint swelling, tenderness or erythema  Vascular: 2+ radial, femoral; DP/PT pulses B/L  Dermatologic: no rash, no petechia   Lymphatic: no submandibular or cervical LAD  Neurologic: AAOx3; CNII-XII grossly intact; 5/5 strength throughout, sensory symmetrically intact in B/L LE   Psychiatric: pleasant and conversive; affect and characteristics of appearance, verbalizations, behaviors are appropriate    MEDICATIONS  (STANDING):  aspirin  chewable 81 milliGRAM(s) Oral daily  atorvastatin 80 milliGRAM(s) Oral at bedtime  cyanocobalamin 500 MICROGram(s) Oral daily  levothyroxine 75 MICROGram(s) Oral daily  metoprolol     tartrate 50 milliGRAM(s) Oral two times a day  pantoprazole  Injectable 40 milliGRAM(s) IV Push daily    Allergies  No Known Allergies    LABS:                        7.1    9.8   )-----------( 277      ( 10 Dec 2017 07:08 )             22.6     RADIOLOGY & ADDITIONAL TESTS:  Culture - Blood (collected 12-08-17 @ 15:14)  Source: .Blood Blood-Peripheral  Preliminary Report (12-09-17 @ 04:01):    No growth at 12 hours    Culture - Blood (collected 12-08-17 @ 15:13)  Source: .Blood Blood-Peripheral  Preliminary Report (12-09-17 @ 04:01):    No growth at 12 hours      Assessment and Plan:   · Assessment		  89F PMH breast cancer, AAA repair, GERD, HTN, anxiety, recent admission for sepsis 2/2 aspiration pneumonia, found to have hypercalcemia and lytic bone lesion w/ associated suspicious right lung mass(s/p bx 11/29) who presents w/ slurred speech noted this morning.       Problem/Plan - 1:  ·  Problem: CVA (cerebral vascular accident).  Plan: Slurred speech and lower extremity weakness this AM; last known normal last night (12/08/17) @ 7pm. 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 Patient only on simvastatin 10mg at home. Symptoms resolved while in ED. MRA H/N without occlusion   - NIHSS 0  - s/p  and Lipitor 80mg in ED. Will c/w ASA 81mg daily & Lipitor 80mg qhs  - echocardiogram w/ bubble showed intraatrial shunt. Will consult cards for possible closure of PFO Vs ASD  -LILY for further workup of cardioembolic etiology  -LE duplex for DVT rule out  - PT/OT  - stroke education  - DVT ppx   - telemetry monitoring   - passed bedside dysphagia, started on diet  -ASA/ statin.     Problem/Plan - 2:  ·  Problem: Hypercalcemia.  Plan: recent hospitalization for hypercalcemia in setting of malignancy. 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 Ca normal this admission.    -recent labs show low PTH, low PTHrP  -ionized calcium  -no changes on telemetry/EKG  -monitor electrolytes, IVF hydration     # Anemia: Hb 7 this AM, 7.5m on repeat. f/u FOBT.  40 mg IV protonix started.      Problem/Plan - 3:  ·  Problem: Lung mass.  Plan: as per above  -Will consult palliative for goals of care.     Problem/Plan - 4:  ·  Problem: Fever.  Plan: discharged on Nov 17th w/ diagnosis of CAP s/p treatment  presents to ED w/ fever 100.4, 2/4SIRS without lactate. No clear source of infection. PNA possible however CXR with improvement of effusions and infiltrate since last admission. UA not clean catch. s/p Vancomycin and Zosyn in ED  - will hold off on abx   - repeat CXR in AM  - Leukocytosis resolved on CBC  - f/u blood and urine cultures.      Problem/Plan - 5:  ·  Problem: Squamous cell carcinoma.  Plan: lytic lesion on 6th rib not suggestive of breast CA mets, rather new malignancy.   - will further treatment options with patient and family     Problem/Plan - 6:  Problem: AAA (abdominal aortic aneurysm). Plan: BP control  stable.     Problem/Plan - 7:  ·  Problem: HTN (hypertension).  Plan: c/w home metoprolol 50mg BID.      Problem/Plan - 8:  ·  Problem: GERD (gastroesophageal reflux disease).  Plan: famotidine 20mg daily.      Problem/Plan - 9:  ·  Problem: Need for prophylactic measure.  Plan: HSQ, SCD's.      Problem/Plan - 10:  Problem: Nutrition, metabolism, and development symptoms. Plan; NPO until passed bedside dysphagia   monitor/ replete electrolytes K>4, Mg>2   IVF @ 75cc/hr     DNR/DNI

## 2017-12-10 NOTE — PROGRESS NOTE ADULT - PROBLEM SELECTOR PLAN 1
Slurred speech and lower extremity weakness this AM; last known normal last night (12/08/17) @ 7pm. 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 Patient only on simvastatin 10mg at home. Symptoms resolved while in ED. MRA H/N without occlusion   - NIHSS 0  - s/p  and Lipitor 80mg in ED. Will c/w ASA 81mg daily & Lipitor 80mg qhs  - echocardiogram w/ bubble showed intraatrial shunt. Will consider consult cards for possible closure of PFO Vs ASD after goals of care discussion with family  -LILY for further workup of cardioembolic etiology  -LE duplex for DVT rule out  - PT/OT  - stroke education  - DVT ppx   - telemetry monitoring   - passed bedside dysphagia, started on diet  -ASA/ statin

## 2017-12-10 NOTE — PROGRESS NOTE ADULT - PROBLEM SELECTOR PLAN 2
recent hospitalization for hypercalcemia in setting of malignancy. 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 Ca normal this admission. Managed by Dr. Valdez.   -recent labs show low PTH, low PTHrP  -ionized calcium  -no changes on telemetry/EKG  -monitor electrolytes, IVF hydration   -f/u with Dr. Valdez rec's in AM    # Anemia: Hb 7 this AM, 7.5m on repeat. f/u FOBT.  40 mg IV protonix PO daily started Overnight T 101 rectally. CXR with worsening infiltrate  - Started on vanc/zosyn  - F/u BCx, UCx

## 2017-12-10 NOTE — PROGRESS NOTE ADULT - PROBLEM SELECTOR PLAN 4
discharged on Nov 17th w/ diagnosis of CAP s/p treatment  presents to ED w/ fever 100.4, 2/4SIRS without lactate. No clear source of infection. PNA possible however CXR with improvement of effusions and infiltrate since last admission. UA not clean catch. s/p Vancomycin and Zosyn in ED  - will hold off on abx   - repeat CXR in AM  - Leukocytosis resolved on CBC  - f/u blood and urine cultures as per above  -Will consult palliative for goals of care

## 2017-12-11 DIAGNOSIS — C34.90 MALIGNANT NEOPLASM OF UNSPECIFIED PART OF UNSPECIFIED BRONCHUS OR LUNG: ICD-10-CM

## 2017-12-11 DIAGNOSIS — Z51.5 ENCOUNTER FOR PALLIATIVE CARE: ICD-10-CM

## 2017-12-11 DIAGNOSIS — Z66 DO NOT RESUSCITATE: ICD-10-CM

## 2017-12-11 LAB
ANION GAP SERPL CALC-SCNC: 11 MMOL/L — SIGNIFICANT CHANGE UP (ref 5–17)
BUN SERPL-MCNC: 19 MG/DL — SIGNIFICANT CHANGE UP (ref 7–23)
CALCIUM SERPL-MCNC: 9.7 MG/DL — SIGNIFICANT CHANGE UP (ref 8.4–10.5)
CHLORIDE SERPL-SCNC: 102 MMOL/L — SIGNIFICANT CHANGE UP (ref 96–108)
CO2 SERPL-SCNC: 21 MMOL/L — LOW (ref 22–31)
CREAT SERPL-MCNC: 0.95 MG/DL — SIGNIFICANT CHANGE UP (ref 0.5–1.3)
GLUCOSE SERPL-MCNC: 100 MG/DL — HIGH (ref 70–99)
HCT VFR BLD CALC: 22.7 % — LOW (ref 34.5–45)
HCT VFR BLD CALC: 23.3 % — LOW (ref 34.5–45)
HGB BLD-MCNC: 7 G/DL — CRITICAL LOW (ref 11.5–15.5)
HGB BLD-MCNC: 7.2 G/DL — LOW (ref 11.5–15.5)
MCHC RBC-ENTMCNC: 28.8 PG — SIGNIFICANT CHANGE UP (ref 27–34)
MCHC RBC-ENTMCNC: 28.9 PG — SIGNIFICANT CHANGE UP (ref 27–34)
MCHC RBC-ENTMCNC: 30.8 G/DL — LOW (ref 32–36)
MCHC RBC-ENTMCNC: 30.9 G/DL — LOW (ref 32–36)
MCV RBC AUTO: 93.2 FL — SIGNIFICANT CHANGE UP (ref 80–100)
MCV RBC AUTO: 93.8 FL — SIGNIFICANT CHANGE UP (ref 80–100)
OB PNL STL: NEGATIVE — SIGNIFICANT CHANGE UP
OSMOLALITY SERPL: 283 MOSM/KG — SIGNIFICANT CHANGE UP (ref 280–301)
PLATELET # BLD AUTO: 265 K/UL — SIGNIFICANT CHANGE UP (ref 150–400)
PLATELET # BLD AUTO: 304 K/UL — SIGNIFICANT CHANGE UP (ref 150–400)
POTASSIUM SERPL-MCNC: 3.8 MMOL/L — SIGNIFICANT CHANGE UP (ref 3.5–5.3)
POTASSIUM SERPL-SCNC: 3.8 MMOL/L — SIGNIFICANT CHANGE UP (ref 3.5–5.3)
RBC # BLD: 2.42 M/UL — LOW (ref 3.8–5.2)
RBC # BLD: 2.5 M/UL — LOW (ref 3.8–5.2)
RBC # FLD: 16 % — SIGNIFICANT CHANGE UP (ref 10.3–16.9)
RBC # FLD: 16.5 % — SIGNIFICANT CHANGE UP (ref 10.3–16.9)
SODIUM SERPL-SCNC: 134 MMOL/L — LOW (ref 135–145)
VANCOMYCIN TROUGH SERPL-MCNC: 9.8 UG/ML — LOW (ref 10–20)
WBC # BLD: 10.2 K/UL — SIGNIFICANT CHANGE UP (ref 3.8–10.5)
WBC # BLD: 9.4 K/UL — SIGNIFICANT CHANGE UP (ref 3.8–10.5)
WBC # FLD AUTO: 10.2 K/UL — SIGNIFICANT CHANGE UP (ref 3.8–10.5)
WBC # FLD AUTO: 9.4 K/UL — SIGNIFICANT CHANGE UP (ref 3.8–10.5)

## 2017-12-11 PROCEDURE — 99223 1ST HOSP IP/OBS HIGH 75: CPT

## 2017-12-11 PROCEDURE — 99233 SBSQ HOSP IP/OBS HIGH 50: CPT

## 2017-12-11 PROCEDURE — 93970 EXTREMITY STUDY: CPT | Mod: 26

## 2017-12-11 RX ORDER — VANCOMYCIN HCL 1 G
1000 VIAL (EA) INTRAVENOUS EVERY 24 HOURS
Qty: 0 | Refills: 0 | Status: DISCONTINUED | OUTPATIENT
Start: 2017-12-11 | End: 2017-12-11

## 2017-12-11 RX ORDER — VANCOMYCIN HCL 1 G
750 VIAL (EA) INTRAVENOUS EVERY 24 HOURS
Qty: 0 | Refills: 0 | Status: DISCONTINUED | OUTPATIENT
Start: 2017-12-11 | End: 2017-12-11

## 2017-12-11 RX ORDER — POLYETHYLENE GLYCOL 3350 17 G/17G
17 POWDER, FOR SOLUTION ORAL DAILY
Qty: 0 | Refills: 0 | Status: DISCONTINUED | OUTPATIENT
Start: 2017-12-11 | End: 2017-12-13

## 2017-12-11 RX ORDER — POTASSIUM CHLORIDE 20 MEQ
40 PACKET (EA) ORAL ONCE
Qty: 0 | Refills: 0 | Status: COMPLETED | OUTPATIENT
Start: 2017-12-11 | End: 2017-12-11

## 2017-12-11 RX ORDER — SODIUM CHLORIDE 9 MG/ML
500 INJECTION INTRAMUSCULAR; INTRAVENOUS; SUBCUTANEOUS ONCE
Qty: 0 | Refills: 0 | Status: COMPLETED | OUTPATIENT
Start: 2017-12-11 | End: 2017-12-11

## 2017-12-11 RX ORDER — POTASSIUM CHLORIDE 20 MEQ
40 PACKET (EA) ORAL ONCE
Qty: 0 | Refills: 0 | Status: DISCONTINUED | OUTPATIENT
Start: 2017-12-11 | End: 2017-12-11

## 2017-12-11 RX ORDER — HEPARIN SODIUM 5000 [USP'U]/ML
5000 INJECTION INTRAVENOUS; SUBCUTANEOUS EVERY 12 HOURS
Qty: 0 | Refills: 0 | Status: DISCONTINUED | OUTPATIENT
Start: 2017-12-11 | End: 2017-12-13

## 2017-12-11 RX ORDER — ACETAMINOPHEN 500 MG
650 TABLET ORAL EVERY 6 HOURS
Qty: 0 | Refills: 0 | Status: DISCONTINUED | OUTPATIENT
Start: 2017-12-11 | End: 2017-12-13

## 2017-12-11 RX ORDER — LISINOPRIL 2.5 MG/1
2.5 TABLET ORAL DAILY
Qty: 0 | Refills: 0 | Status: DISCONTINUED | OUTPATIENT
Start: 2017-12-11 | End: 2017-12-13

## 2017-12-11 RX ADMIN — SODIUM CHLORIDE 2000 MILLILITER(S): 9 INJECTION INTRAMUSCULAR; INTRAVENOUS; SUBCUTANEOUS at 01:20

## 2017-12-11 RX ADMIN — Medication 50 MILLIGRAM(S): at 06:02

## 2017-12-11 RX ADMIN — POLYETHYLENE GLYCOL 3350 17 GRAM(S): 17 POWDER, FOR SOLUTION ORAL at 12:08

## 2017-12-11 RX ADMIN — Medication 650 MILLIGRAM(S): at 22:15

## 2017-12-11 RX ADMIN — SODIUM CHLORIDE 60 MILLILITER(S): 9 INJECTION INTRAMUSCULAR; INTRAVENOUS; SUBCUTANEOUS at 11:54

## 2017-12-11 RX ADMIN — PANTOPRAZOLE SODIUM 40 MILLIGRAM(S): 20 TABLET, DELAYED RELEASE ORAL at 11:53

## 2017-12-11 RX ADMIN — Medication 75 MICROGRAM(S): at 06:02

## 2017-12-11 RX ADMIN — Medication 81 MILLIGRAM(S): at 11:53

## 2017-12-11 RX ADMIN — PIPERACILLIN AND TAZOBACTAM 200 GRAM(S): 4; .5 INJECTION, POWDER, LYOPHILIZED, FOR SOLUTION INTRAVENOUS at 11:54

## 2017-12-11 RX ADMIN — Medication 50 MILLIGRAM(S): at 17:55

## 2017-12-11 RX ADMIN — SODIUM CHLORIDE 60 MILLILITER(S): 9 INJECTION INTRAMUSCULAR; INTRAVENOUS; SUBCUTANEOUS at 22:46

## 2017-12-11 RX ADMIN — Medication 10 MILLIGRAM(S): at 16:47

## 2017-12-11 RX ADMIN — PIPERACILLIN AND TAZOBACTAM 200 GRAM(S): 4; .5 INJECTION, POWDER, LYOPHILIZED, FOR SOLUTION INTRAVENOUS at 17:55

## 2017-12-11 RX ADMIN — Medication 40 MILLIEQUIVALENT(S): at 12:10

## 2017-12-11 RX ADMIN — Medication 250 MILLIGRAM(S): at 05:58

## 2017-12-11 RX ADMIN — PREGABALIN 500 MICROGRAM(S): 225 CAPSULE ORAL at 11:53

## 2017-12-11 RX ADMIN — PIPERACILLIN AND TAZOBACTAM 200 GRAM(S): 4; .5 INJECTION, POWDER, LYOPHILIZED, FOR SOLUTION INTRAVENOUS at 06:03

## 2017-12-11 RX ADMIN — ATORVASTATIN CALCIUM 80 MILLIGRAM(S): 80 TABLET, FILM COATED ORAL at 22:15

## 2017-12-11 NOTE — CONSULT NOTE ADULT - PROBLEM SELECTOR RECOMMENDATION 4
with mets to skull and rib cage  family is clear that pt would not want chemotherapy or radiation  all efforts are palliative at this piint

## 2017-12-11 NOTE — CONSULT NOTE ADULT - SUBJECTIVE AND OBJECTIVE BOX
Patient is a 89y old  Female who presents with a chief complaint of slurring of speech/admitted for acute stroke (08 Dec 2017 23:29)       HPI:  89F PMH breast cancer, AAA repair, GERD, HTN, anxiety, recent admission for sepsis 2/2 aspiration pneumonia, found to have hypercalcemia and lytic bone lesion w/ associated suspicious right lung mass(s/p bx ) who presents w/ slurred speech noted this morning.  Pt. outside the window for tPA.  Symptoms resolved on presentation NIHSS 0. 10-system ROS negative except for left rib pain    In the ED VS(T:99.1F HR:91 BP:117/72 RR:18 O2%:94 on RA ). Labs significant for WBC 14.8, H/H 8.2/25, Na 128, Ca 11.8(qing), UA w/ WBC, LE, bacteria. CTH revealed bony mets and lacunar infarcts, MRI revealed bony mets and multiple acute/subacute infarcts consistent with embolic phenomenon v. hypercoagulable state.  MRA negative.  Admitted to  for acute stroke. (08 Dec 2017 23:29)      PAST MEDICAL & SURGICAL HISTORY:  AAA (abdominal aortic aneurysm)  Hypothyroid  Lung mass  Hypercalcemia  Breast cancer in female  Hyperlipidemia  Anxiety  HTN (hypertension)  GERD (gastroesophageal reflux disease)  History of AAA (abdominal aortic aneurysm) repair  H/O:   H/O lumpectomy      MEDICATIONS  (STANDING):  aspirin  chewable 81 milliGRAM(s) Oral daily  atorvastatin 80 milliGRAM(s) Oral at bedtime  cyanocobalamin 500 MICROGram(s) Oral daily  levothyroxine 75 MICROGram(s) Oral daily  metoprolol     tartrate 50 milliGRAM(s) Oral two times a day  pantoprazole  Injectable 40 milliGRAM(s) IV Push daily  piperacillin/tazobactam IVPB. 3.375 Gram(s) IV Intermittent every 6 hours  potassium chloride    Tablet ER 40 milliEquivalent(s) Oral once  sodium chloride 0.9%. 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    Functional Level Prior to Admission: required partial assist with bathing, walked with a rollator/supervision of A    FAMILY HISTORY:  No pertinent family history in first degree relatives      CBC Full  -  ( 11 Dec 2017 06:43 )  WBC Count : 9.4 K/uL  Hemoglobin : 7.0 g/dL  Hematocrit : 22.7 %  Platelet Count - Automated : 304 K/uL  Mean Cell Volume : 93.8 fL  Mean Cell Hemoglobin : 28.9 pg  Mean Cell Hemoglobin Concentration : 30.8 g/dL  Auto Neutrophil # : x  Auto Lymphocyte # : x  Auto Monocyte # : x  Auto Eosinophil # : x  Auto Basophil # : x  Auto Neutrophil % : x  Auto Lymphocyte % : x  Auto Monocyte % : x  Auto Eosinophil % : x  Auto Basophil % : x      12-11    134<L>  |  102  |  19  ----------------------------<  100<H>  3.8   |  21<L>  |  0.95    Ca    9.7      11 Dec 2017 06:43        Urinalysis Basic - ( 10 Dec 2017 03:56 )    Color: Yellow / Appearance: SL Cloudy / S.010 / pH: x  Gluc: x / Ketone: NEGATIVE  / Bili: Negative / Urobili: 0.2 E.U./dL   Blood: x / Protein: Trace mg/dL / Nitrite: NEGATIVE   Leuk Esterase: NEGATIVE / RBC: < 5 /HPF / WBC < 5 /HPF   Sq Epi: x / Non Sq Epi: 5-10 /HPF / Bacteria: Many /HPF          Radiology:    < from: CT Head No Cont (17 @ 12:39) >  EXAM:  CT BRAIN                          PROCEDURE DATE:  2017                     INTERPRETATION:  PROCEDURE: CT brain without intravenous contrast    INDICATION: Slurred speech    TECHNIQUE: Multiple axial images were obtained at 5 mm intervals from the   skull base to the vertex. Sagittal and coronal reformatted images were   obtained from the axial data set. The images were reviewed in brain and   bone windows.    COMPARISON: CT's brain 2017; 2017, and 2017    FINDINGS: The CT examination demonstrates age appropriate volume loss.   The ventricles are stable in size. There is no midline shift or extra   axial collections. The gray white differentiation appears within normal   limits. There is no intracranial hemorrhage or acute transcortical   infarct. There is extensive patchy areas of hypodensity within the   periventricular and subcortical white matter which may represent the   sequela of small vessel ischemic disease. Given the extensive white   matter disease and multiple lacunar infarcts, acute ischemia would be   better evaluated with a MRI with diffusion imaging.    The bony windows demonstrates multiple bony lesions. There is a lytic   lesion involving the left frontal bone with soft epidural and   extraosseous soft tissue component (series 2 image 15 and series 4 image   25). The lesion measures approximately 11 mm width x 16 mm height. There   also is a high right frontal vertex bony lesion with epidural soft tissue   component (series 4 image 30). This lesion measures approximately 14 mm x   9.5 mm.  These findings may represent bony metastases given history of   breast cancer.    The lenses are absent bilaterally which may be secondary to prior   cataract surgery. The visualized paranasal sinuses are within normal   limits. The mastoid air cells are well aerated.    IMPRESSION: No intracranial hemorrhage or acute transcortical infarct.   Multiple bony lesions which are new from the 2017 study and may   < from: MR Head No Cont (17 @ 18:20) >  EXAM:  MR BRAIN                          PROCEDURE DATE:  2017                     INTERPRETATION:  Clinical history: slurred speech, h/o metastatic ca w   new skull lesions    Technique: MRI of the brain was performed utilizing sagittal and axial   T1, axial T2, axial gradient echo, axial and coronal FLAIR and diffusion   imaging.    There are no prior studies available for comparison.    Findings: There are scattered acute to subacute punctate infarctions in   left basal ganglia, left parietal cortex, left parietal white matter (   image #52) ,right subcortical parietal lobe, right occipital cortex   (image # 44).     There are confluent patchy T2 and Flair signal hyperintensities   throughout within the white matter extending into the internal and   external capsules that are nonspecific. There are patchy areas of T2 and   flair signal hyperintensities within the bilateral thalami and nora.   There is no evidence of mass-effect or midline shift. There are bilateral   basal ganglia, thalamic, pontine chronic lacunar infarcts. There is no   evidence of intra or extra-axial fluid collection. The ventricles are of   normal size and caliber. There is no evidence of acute infarction.   Evaluation of the intracranial vascular flow-voids appear within normal   limits.     There is a punctate focus of gradient echo hypointensity in the right   frontal white matter. Bilateral thalami, mesial temporal lobes, bilateral   nora and cerebellar hemispheres consistent with hemosiderin from prior   microhemorrhage (trauma, blood dyscrasia, vasculitis, infarction, less   likely amyloid) or calcification. There is also a linear focus of   gradient echo hypointensity along the left frontal parietal gyrus that   may represent hemosiderinfrom prior subarachnoid hemorrhage.    There is an approximately 1.8 cm x 1.2 cm focus of T2 and FLAIR signal   hyperintensity in the high right frontal calvarium and a small epidural   soft tissue component with erosive changes of the inner cortex. There is   an approximately 1.7 cm x 1.4 cm x 2.2 cm soft tissue focus in the left   frontal calvarium with an epidural soft tissue component. Findings are   consistent with metastasis.    The visualized paranasal sinuses and bilateral mastoid air cells are   clear. There is a approximately 1 cm  focus of T2 signal hyperintensity   and hypointensity with partial T1 hyperintensity at the midline of the   nasopharynx consistent with a Tornwaldt cyst.    Impression: Multiple punctate acute to subacute infarcts suspicious for   embolic etiology or hypercoagulable state.     Nonspecific confluent foci of T2 and Flair signal hyperintensities within   the white matter; findings may be seen in patient with migraine   headaches, vasculitis, severe microvascular disease, demyelinating   disease, Lyme disease, viral illness and chemotherapy/radiation therapy.      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      Bilateralthalami, mesial temporal lobes, bilateral nora and cerebellar   hemispheres  hemosiderin from prior microhemorrhage (hypertension,   trauma, blood dyscrasia, vasculitis, infarction, less likely amyloid) or   calcification    < end of copied text >  represent bony metastases. Given the extensive white matter disease and   multiple lacunar infarcts, acute ischemia as well as intracranial   metastases would be better evaluated with a MRI.        < end of copied text >    < from: MR Angio Head No Cont (17 @ 18:20) >  EXAM:  MR ANGIO BRAIN                          PROCEDURE DATE:  2017                     INTERPRETATION:  Clinical history: Slurred speech.    Technique: MRA of the intracranial circulation was performed using 3-D   time of flight technique.    Findings: Evaluation of the anterior circulation demonstrates normal   course and caliber of the distal internal carotid arteries. There is a   normal appearance to the bilateral anterior cerebral and middle cerebral   arteries.    Evaluation the posterior circulation demonstrates normal course and   caliber of the bilateral vertebral arteries, vertebrobasilar junction and   basilar artery. The bilateral posterior cerebral arteries are also within   normal limits.    There is no evidence of aneurysm or stenosis  .    Impression: Normal MRA of the proximal intracranial circulation.      < end of copied text >                      Vital Signs Last 24 Hrs  T(C): 36.4 (11 Dec 2017 05:59), Max: 38.3 (10 Dec 2017 23:00)  T(F): 97.6 (11 Dec 2017 05:59), Max: 101 (10 Dec 2017 23:00)  HR: 79 (11 Dec 2017 05:15) (66 - 86)  BP: 110/59 (11 Dec 2017 05:15) (92/53 - 160/77)  BP(mean): 80 (11 Dec 2017 05:15) (80 - 110)  RR: 17 (11 Dec 2017 05:15) (17 - 18)  SpO2: 97% (11 Dec 2017 05:15) (94% - 99%)    REVIEW OF SYSTEMS:    CONSTITUTIONAL: No fever, weight loss, or fatigue  EYES: No eye pain, visual disturbances, or discharge  ENMT:  No difficulty hearing, tinnitus, vertigo; No sinus or throat pain  NECK: No pain or stiffness  BREASTS: No pain, masses, or nipple discharge  RESPIRATORY: No cough, wheezing, chills or hemoptysis; No shortness of breath  CARDIOVASCULAR: No chest pain, palpitations, dizziness, or leg swelling  GASTROINTESTINAL: No abdominal or epigastric pain. No nausea, vomiting, or hematemesis; No diarrhea or constipation. No melena or hematochezia.  GENITOURINARY: No dysuria, frequency, hematuria, or incontinence  NEUROLOGICAL: No headaches, memory loss, loss of strength, numbness, or tremors  SKIN: No itching, burning, rashes, or lesions   LYMPH NODES: No enlarged glands  ENDOCRINE: No heat or cold intolerance; No hair loss  MUSCULOSKELETAL: No joint pain or swelling; No muscle, back, or extremity pain  PSYCHIATRIC: No depression, anxiety, mood swings, or difficulty sleeping  HEME/LYMPH: No easy bruising, or bleeding gums  ALLERGY AND IMMUNOLOGIC: No hives or eczema      Physical Exam: elderly appearing  woman lying in bed, w/o complaints, pleasant    HEENT: normocephalic,  anicteric,  atraumatic    Neck: supple, negative JVD, negative carotid bruits,    Chest: CTA bilaterally, neg wheeze, rhonchi, rales, crackles, egophany    Cardiovascular: regular rate and rhythm, neg murmurs/rubs/gallops    Abdomen: soft, non distended, non tender, negative rebound/guarding, normal bowel sounds, neg hepatosplenomegaly    Extremities: WWP, neg cyanosis/clubbing/edema, negative calf tenderness to palpation, negative Kristen's sign    :     Neurologic Exam:    Alert and oriented x 3  to person, place, date/year, speech fluent w/o dysarthria, follows simple commands    Cranial Nerves:     II:                       pupils equal, round and reactive to light, visual fields intact   III/ IV/VI:             extraocular movements intact, neg nystagmus, ptosis  V:                      facial sensation intact, V1-3 normal  VII:                     face symmetric, no droop, normal eye closure and smile  VIII:                    hearing intact to finger rub bilaterally  IX/ X:                  soft palate rise symmetrical  XI:                      head turning, shoulder shrug normal  XII:                     tongue midline    Motor Exam:    Bilateral UE:         >3+/5   Bilateral LE:          > 3+/5  Sensory: intact to LT/PP in all UE/LE dermatomes    DTR:        = biceps/     triceps/     brachioradialis                 = patella/   medial hamstring/    ankle                 neg clonus                 neg Babinski                 neg Hoffmans    Finger to Nose: wnl    Heel to Shin:      wnl    Rapid Alternating movements:  wnl    Joint Position Sense:  intact    Romberg: NT    Tandem Walking: NT    Gait:  NT        PM&R Impression:    1) deconditioned  2) gait dysfunction: multi factorial  3) Problem: CVA (cerebral vascular accident).  Plan: Slurred speech and lower extremity weakness this AM; last known normal last night (17) @ 7pm. 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 Symptoms resolved while in ED. MRA H/N without occlusion   - NIHSS 0      Recommendations:    1) Physical therapy focusing on therapeutic exercises, bed mobility/transfer out of bed evaluation, progressive ambulation with assistive devices.    2) Disposition Plan:  d/c home with home physical therapy, 24 hr/7 days home care services

## 2017-12-11 NOTE — PROGRESS NOTE ADULT - PROBLEM SELECTOR PLAN 2
Overnight T 101 rectally. CXR with worsening infiltrate  - Started on vanc/zosyn (next through 12/5 in AM)  - BCx, UCx NGTD Overnight T 101 rectally. CXR with worsening infiltrate  - Started on vanc/zosyn (next through 12/15 in AM)  - BCx, UCx NGTD, RVP negative Overnight T 101F rectally. CXR with worsening infiltrate  - Started on vanc/zosyn (next through 12/15 in AM)  - BCx, UCx NGTD, RVP negative Overnight T 101F rectally. CXR with worsening infiltrate  - Started on vanc/zosyn daily through   - BCx, UCx NGTD, RVP negative

## 2017-12-11 NOTE — PROGRESS NOTE ADULT - ATTENDING COMMENTS
Patient seen and examined.  Agree with above with following adjustments -  Her symptoms have mainly improved but there was concern for strokes seen on MRI.  They appear embolic but no source at this time.  Unclear if hypercoagulable due to cancer or if possibly atrial fibrillation.      Secondary stroke prevention -   - Agree with Aspirin 81mg for antiplatelet since hasn't been on antiplatelet before.  - Now on Atorvastatin 80mg but can possibly decrease depending on results of lipid profile.    Stroke   - Agree with LE dopplers to rule out DVT since she has PFO.  Not sure that would pursue closure of PFO since she hasn't failed medical therapy. Patient seen and examined.  Agree with above with following adjustments -  Her symptoms have mainly improved but there was concern for strokes seen on MRI.  They appear embolic but no source at this time.  Unclear if hypercoagulable due to cancer or if possibly atrial fibrillation.      Secondary stroke prevention -   - Agree with Aspirin 81mg for antiplatelet since hasn't been on antiplatelet before.  - Now on Atorvastatin 80mg but can possibly decrease depending on results of lipid profile.    Stroke workup -   - Agree with LE dopplers to rule out DVT since she has PFO.  Not sure that would pursue closure of PFO since she hasn't failed medical therapy.  - Discussed possibly placing loop recorder to rule out atrial fibrillation as cause of emboli.  This would change her medications.  - Seen by PT - dispo home with 24/7 care that she already has.    Course complicated by fever overnight, now on antibiotics.    Called Palliative Care to define goals.  Patient DNR/DNI but never filled out MOLST form.  Discussed above plan with patient's  and daughter who are at bedside.

## 2017-12-11 NOTE — CONSULT NOTE ADULT - PROBLEM SELECTOR RECOMMENDATION 3
resolved.  Pt family, pt may have outpt follow up with Dr Roper and IV fluid if necessary to address hypercalcemia

## 2017-12-11 NOTE — CONSULT NOTE ADULT - SUBJECTIVE AND OBJECTIVE BOX
ARVIND BURTON   MRN-[id number]     ([id number]):     HPI:  89F PMH breast cancer, AAA repair, GERD, HTN, anxiety, recent admission for sepsis 2/2 aspiration pneumonia, found to have hypercalcemia and lytic bone lesion w/ associated suspicious right lung mass(s/p bx ) who presents w/ slurred speech noted this morning.  Pt. outside the window for tPA.  Symptoms resolved on presentation NIHSS 0. 10-system ROS negative except for left rib pain    In the ED VS(T:99.1F HR:91 BP:117/72 RR:18 O2%:94 on RA ). Labs significant for WBC 14.8, H/H 8.2/25, Na 128, Ca 11.8(qing), UA w/ WBC, LE, bacteria. CTH revealed bony mets and lacunar infarcts, MRI revealed bony mets and multiple acute/subacute infarcts consistent with embolic phenomenon v. hypercoagulable state.  MRA negative.  Admitted to  for acute stroke. (08 Dec 2017 23:29)      PAST MEDICAL & SURGICAL HISTORY:  AAA (abdominal aortic aneurysm)  Hypothyroid  Lung mass  Hypercalcemia  Breast cancer in female  Hyperlipidemia  Anxiety  HTN (hypertension)  GERD (gastroesophageal reflux disease)  History of AAA (abdominal aortic aneurysm) repair  H/O:   H/O lumpectomy    FAMILY HISTORY:  No pertinent family history in first degree relatives    Reviewed and not pertinent    ROS:    Unable to attain due to:                      Dyspnea (Saadia 0-10):       0/10                 N/V (Y/N):     no                         Secretions (Y/N):   no               Agitation(Y/N):  no  Pain (Y/N):     yes mild back pain  -Provocation/Palliation:  stiffness "discomfort" from laying in bed  -Quality/Quantity:  dull ache  -Radiating:  no  -Severity:  mild  -Timing/Frequency:  intermittent  -Impact on ADLs:  limiting    General:  + weight loss  HEENT:    dry mouth  Neck:  Denied  CVS:  Denied  Resp:  Denied  GI:  + anorexia, + constipation  :  Denied  Musc:  weak  Neuro:  Denied  Psych:  Denied  Skin:  changes to lower extremiteis  Lymph:  Denied    Allergies:  No Known Allergies    Intolerances: unknown    Opiate Naive (Y/N):   yes  -iStop reviewed (Y/N):  yes Ref # [id number]  has prescriptions for ambien    Medications:      MEDICATIONS  (STANDING):  aspirin  chewable 81 milliGRAM(s) Oral daily  atorvastatin 80 milliGRAM(s) Oral at bedtime  cyanocobalamin 500 MICROGram(s) Oral daily  levothyroxine 75 MICROGram(s) Oral daily  metoprolol     tartrate 50 milliGRAM(s) Oral two times a day  pantoprazole  Injectable 40 milliGRAM(s) IV Push daily  piperacillin/tazobactam IVPB. 3.375 Gram(s) IV Intermittent every 6 hours  polyethylene glycol 3350 17 Gram(s) Oral daily  sodium chloride 0.9%. 1000 milliLiter(s) (60 mL/Hr) IV Continuous <Continuous>    MEDICATIONS  (PRN):  acetaminophen   Tablet 650 milliGRAM(s) Oral every 6 hours PRN For Temp greater than 38 C (100.4 F)  bisacodyl Suppository 10 milliGRAM(s) Rectal daily PRN Constipation    Labs:    CBC:                        7.2    10.2  )-----------( 265      ( 11 Dec 2017 11:17 )             23.3     CMP:        134<L>  |  102  |  19  ----------------------------<  100<H>  3.8   |  21<L>  |  0.95    Ca    9.7      11 Dec 2017 06:43      Urinalysis Basic - ( 10 Dec 2017 03:56 )    Color: Yellow / Appearance: SL Cloudy / S.010 / pH: x  Gluc: x / Ketone: NEGATIVE  / Bili: Negative / Urobili: 0.2 E.U./dL   Blood: x / Protein: Trace mg/dL / Nitrite: NEGATIVE   Leuk Esterase: NEGATIVE / RBC: < 5 /HPF / WBC < 5 /HPF   Sq Epi: x / Non Sq Epi: 5-10 /HPF / Bacteria: Many /HPF    Imaging:  Reviewed     Doppler  IMPRESSION:  No deep vein thrombosis seen above the knee.    5.3 cm heterogeneous hypoechoic structure within the left groin region   with internal debris and vascularity. This may represent an inflamed and   necrotic lymph node. Differential includes an evolving abscess.      PEx:  T(C): 36.4 (17 @ 17:13), Max: 38.3 (12-10-17 @ 23:00)  HR: 96 (17 @ 17:55) (74 - 108)  BP: 169/77 (17 @ 17:55) (92/53 - 173/81)  RR: 16 (17 @ 17:55) (16 - 18)  SpO2: 98% (17 @ 17:55) (94% - 100%)  Wt(kg): 40.4kgs    I&O's Summary  10 Dec 2017 07:01  -  11 Dec 2017 07:00  --------------------------------------------------------  IN: 600 mL / OUT: 0 mL / NET: 600 mL    General: ill appearing, frail, weak  HEENT:  nc/at, dry mucus membranes, PERRLA, equal smile, no lymphadenopathy, [place]  Neck: neg nodes  CVS: regular  Resp: nonlabored  GI:  sot, nontender, +BS  :  voids  Musc:   weak  Neuro: awake, conversant  Psych:   appropraite  Skin: warm, dry, + vascular changes to bilat LE  Lymph:  neg  Preadmit Karnofsky:   50 %           Current Karnofsky:  40   %  Cachexia (Y/N):   yes  BMI:  not calculated    Advanced Directives:     DNR/DNI       Decision maker: pt may have some general decision making capacity  Legal surrogate:  pts nathanielrufus Hugo  ([phone]) is her HCP    Social History: , has 4 children, pt has long term care insurance which provides for 24h HA, no spiritual or Scientologist issues    GOALS OF CARE DISCUSSION:       Palliative care info/counseling provided	           Family meeting       Advanced Directives addressed please see Advance Care Planning Note	           See previous Palliative Medicine Note       Documentation of GOC:  dnr/dni, prolonging "good quality"  life, not life at any cost	          REFERRALS:	        Palliative Med        Unit SW/Case Mgmt       Speech/Swallow       Patient/Family Support             Hospice- not at this time

## 2017-12-11 NOTE — PROGRESS NOTE ADULT - SUBJECTIVE AND OBJECTIVE BOX
· Subjective and Objective: 	  HOSPITAL COURSE:  89F PMH breast cancer, AAA repair, GERD, HTN, anxiety, recent admission for sepsis 2/2 aspiration pneumonia, found to have hypercalcemia and lytic bone lesion w/ associated suspicious right lung mass(s/p bx 11/29) who presents w/ slurred speech. Pt outside the window for tPA.  Symptoms resolved on presentation NIHSS 0. 10-system ROS negative except for left rib pain  In the ED VS(T:99.1F HR:91 BP:117/72 RR:18 O2%:94 on RA ). Labs significant for WBC 14.8, H/H 8.2/25, Na 128, Ca 11.8(qing), UA w/ WBC, LE, bacteria. CTH revealed bony mets and lacunar infarcts, MRI revealed bony mets and multiple acute/subacute infarcts consistent with embolic phenomenon v. hypercoagulable state.  MRA negative.  Admitted to  for acute stroke.     ROS  CV: Denies chest pain, palpitations  RESP: Denies SOB  GI: Denies abdominal pain, constipation, diarrhea, nausea, vomiting  : Denies dysuria, hematuria, flank or back pain  ID: Denies fevers, chills  MSK: Denies joint pain   DERM: Denies any rashes, bruising, pruritis  NEURO: No headaches, blurry vision, double vision  ENDO: Denies heat or cold intolerances, changes in weight, thinning of hair  PSYCH: Denies any mood changes    VITAL SIGNS:  Vital Signs Last 24 Hrs  T(C): 36.4 (11 Dec 2017 21:09), Max: 38.3 (10 Dec 2017 23:00)  T(F): 97.6 (11 Dec 2017 21:09), Max: 101 (10 Dec 2017 23:00)  HR: 100 (11 Dec 2017 20:55) (74 - 108)  BP: 169/77 (11 Dec 2017 17:55) (92/53 - 173/81)  BP(mean): 110 (11 Dec 2017 17:55) (80 - 117)  RR: 17 (11 Dec 2017 20:55) (16 - 17)  SpO2: 96% (11 Dec 2017 20:55) (95% - 100%)    PHYSICAL EXAM:  Constitutional: WDWN, NAD, resting comfortably   Head: NC/AT  Eyes: PERRL, EOMI, anicteric sclera, no mucosal pallor  ENT: no nasal discharge; uvula midline, no oropharyngeal erythema or exudates; MMM  Neck: supple; no JVD or thyromegaly, no lymphadenopathy  Respiratory: CTA B/L; no W/R/R, no retractions  Cardiac: +S1/S2; RRR; no M/R/G; PMI non-displaced  Gastrointestinal: abdomen soft, NT/ND; no rebound or guarding; +BSx4  Back: spine midline, no bony tenderness or step-offs; no CVAT B/L  Extremities: warm; no calf tenderness, no pedal edema, no cyanosis, no clubbing  Musculoskeletal: NROM x4; no joint swelling, tenderness or erythema  Vascular: 2+ radial, femoral; DP/PT pulses B/L  Dermatologic: no rash, no petechia   Lymphatic: no submandibular or cervical LAD  Neurologic: AAOx3; CNII-XII grossly intact; 5/5 strength throughout, sensory symmetrically intact in B/L LE   Psychiatric: pleasant and conversive; affect and characteristics of appearance, verbalizations, behaviors are appropriate    MEDICATIONS  (STANDING):  aspirin  chewable 81 milliGRAM(s) Oral daily  atorvastatin 80 milliGRAM(s) Oral at bedtime  cyanocobalamin 500 MICROGram(s) Oral daily  levothyroxine 75 MICROGram(s) Oral daily  metoprolol     tartrate 50 milliGRAM(s) Oral two times a day  pantoprazole  Injectable 40 milliGRAM(s) IV Push daily    Allergies  No Known Allergies    LABS:                        7.2    10.2  )-----------( 265      ( 11 Dec 2017 11:17 )             23.3       RADIOLOGY & ADDITIONAL TESTS:  Culture - Blood (collected 12-08-17 @ 15:14)  Source: .Blood Blood-Peripheral  Preliminary Report (12-09-17 @ 04:01):    No growth at 12 hours    Culture - Blood (collected 12-08-17 @ 15:13)  Source: .Blood Blood-Peripheral  Preliminary Report (12-09-17 @ 04:01):    No growth at 12 hours      Assessment and Plan:   · Assessment		  89F PMH breast cancer, AAA repair, GERD, HTN, anxiety, recent admission for sepsis 2/2 aspiration pneumonia, found to have hypercalcemia and lytic bone lesion w/ associated suspicious right lung mass(s/p bx 11/29) who presents w/ slurred speech noted this morning.       Problem/Plan - 1:  ·  Problem: CVA (cerebral vascular accident).  Plan: Slurred speech and lower extremity weakness this AM; last known normal last night (12/08/17) @ 7pm. 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 Patient only on simvastatin 10mg at home. Symptoms resolved while in ED. MRA H/N without occlusion   - NIHSS 0  - s/p  and Lipitor 80mg in ED. Will c/w ASA 81mg daily & Lipitor 80mg qhs  - echocardiogram w/ bubble showed intraatrial shunt. Will consult cards for possible closure of PFO Vs ASD  -LILY for further workup of cardioembolic etiology  -LE duplex for DVT rule out  - PT/OT  - stroke education  - DVT ppx   - telemetry monitoring   - passed bedside dysphagia, started on diet  -ASA/ statin.     Problem/Plan - 2:  ·  Problem: Hypercalcemia.  Plan: recent hospitalization for hypercalcemia in setting of malignancy. 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 Ca normal this admission.    -recent labs show low PTH, low PTHrP  -ionized calcium  -no changes on telemetry/EKG  -monitor electrolytes, IVF hydration     # Anemia: Tx PRBCs to keep Hb > 7 g/dl  40 mg IV protonix started.      Problem/Plan - 3:  ·  Problem: Lung mass.  Plan: as per above  -Will consult palliative for goals of care.     Problem/Plan - 4:  ·  Problem: Fever.  Plan: discharged on Nov 17th w/ diagnosis of CAP s/p treatment  presents to ED w/ fever 100.4, 2/4SIRS without lactate. No clear source of infection. PNA possible however CXR with improvement of effusions and infiltrate since last admission. UA not clean catch. s/p Vancomycin and Zosyn in ED  - will hold off on abx   - repeat CXR in AM  - Leukocytosis resolved on CBC  - f/u blood and urine cultures.      Problem/Plan - 5:  ·  Problem: Squamous cell carcinoma.  Plan: lytic lesion on 6th rib not suggestive of breast CA mets, rather new malignancy.   - will further treatment options with patient and family     Problem/Plan - 6:  Problem: AAA (abdominal aortic aneurysm). Plan: BP control  stable.     Problem/Plan - 7:  ·  Problem: HTN (hypertension).  Plan: c/w home metoprolol 50mg BID.      Problem/Plan - 8:  ·  Problem: GERD (gastroesophageal reflux disease).  Plan: famotidine 20mg daily.      Problem/Plan - 9:  ·  Problem: Need for prophylactic measure.  Plan: HSQ, SCD's.      Problem/Plan - 10:  Problem: Nutrition, metabolism, and development symptoms. Plan; NPO until passed bedside dysphagia   monitor/ replete electrolytes K>4, Mg>2   IVF @ 75cc/hr     DNR/DNI

## 2017-12-11 NOTE — PROGRESS NOTE ADULT - SUBJECTIVE AND OBJECTIVE BOX
HOSPITAL COURSE:  89F PMH breast cancer, AAA repair, GERD, HTN, anxiety, recent admission for sepsis 2/2 aspiration pneumonia, found to have hypercalcemia and lytic bone lesion w/ associated suspicious right lung mass(s/p bx ) who presents w/ slurred speech. Pt outside the window for tPA.  Symptoms resolved on presentation NIHSS 0. 10-system ROS negative except for left rib pain  In the ED VS(T:99.1F HR:91 BP:117/72 RR:18 O2%:94 on RA ). Labs significant for WBC 14.8, H/H 8.2/25, Na 128, Ca 11.8(qing), UA w/ WBC, LE, bacteria. CTH revealed bony mets and lacunar infarcts, MRI revealed bony mets and multiple acute/subacute infarcts consistent with embolic phenomenon v. hypercoagulable state.  MRA negative.  Admitted to  for acute stroke. Pt had a fever on . Fever workup started, Blood cxs NGTD. Pt started on Vanc/zosyn.     INTERVAL HPI/OVERNIGHT EVENTS:    ROS  CV: Denies chest pain, palpitations  RESP: Denies SOB  GI: Denies abdominal pain, constipation, diarrhea, nausea, vomiting  : Denies dysuria, hematuria, flank or back pain  ID: Denies fevers, chills  MSK: Denies joint pain   DERM: Denies any rashes, bruising, pruritis  NEURO: No headaches, blurry vision, double vision  ENDO: Denies heat or cold intolerances, changes in weight, thinning of hair  PSYCH: Denies any mood changes    VITAL SIGNS:  T(F): 97.6 (17 @ 05:59), Max: 101 (12-10-17 @ 23:00)  HR: 79 (17 @ 05:15) (56 - 86)  BP: 110/59 (17 @ 05:15) (92/53 - 160/77)  BP(mean): 80 (17 @ 05:15) (77 - 110)  RR: 17 (17 @ 05:15) (17 - 18)  SpO2: 97% (17 @ 05:15) (94% - 99%)      12-10-17 @ 07:01  -  17 @ 06:54  --------------------------------------------------------  IN: 600 mL / OUT: 0 mL / NET: 600 mL    PHYSICAL EXAM:  Constitutional: WDWN, NAD, resting comfortably   Head: NC/AT  Eyes: PERRL, EOMI, anicteric sclera, no mucosal pallor  ENT: no nasal discharge; uvula midline, no oropharyngeal erythema or exudates; MMM  Neck: supple; no JVD or thyromegaly, no lymphadenopathy  Respiratory: CTA B/L; no W/R/R, no retractions  Cardiac: +S1/S2; RRR; no M/R/G; PMI non-displaced  Gastrointestinal: abdomen soft, NT/ND; no rebound or guarding; +BSx4  Back: spine midline, no bony tenderness or step-offs; no CVAT B/L  Extremities: warm; no calf tenderness, no pedal edema, no cyanosis, no clubbing  Musculoskeletal: NROM x4; no joint swelling, tenderness or erythema  Vascular: 2+ radial, femoral; DP/PT pulses B/L  Dermatologic: no rash, no petechia   Lymphatic: no submandibular or cervical LAD  Neurologic: Dysarthria AAOx3; CNII-XII grossly intact; 5/5 strength throughout, sensory symmetrically intact in B/L LE   Psychiatric: pleasant and conversive; affect and characteristics of appearance, verbalizations, behaviors are appropriate    MEDICATIONS  (STANDING):  aspirin  chewable 81 milliGRAM(s) Oral daily  atorvastatin 80 milliGRAM(s) Oral at bedtime  cyanocobalamin 500 MICROGram(s) Oral daily  levothyroxine 75 MICROGram(s) Oral daily  metoprolol     tartrate 50 milliGRAM(s) Oral two times a day  pantoprazole  Injectable 40 milliGRAM(s) IV Push daily  piperacillin/tazobactam IVPB. 3.375 Gram(s) IV Intermittent every 6 hours  sodium chloride 0.9%. 1000 milliLiter(s) (60 mL/Hr) IV Continuous <Continuous>  vancomycin  IVPB 750 milliGRAM(s) IV Intermittent every 24 hours    MEDICATIONS  (PRN):  acetaminophen   Tablet 650 milliGRAM(s) Oral every 6 hours PRN For Temp greater than 38 C (100.4 F)      Allergies    No Known Allergies    Intolerances        LABS:                        7.1    9.8   )-----------( 277      ( 10 Dec 2017 07:08 )             22.6     12-10    129<L>  |  98  |  17  ----------------------------<  174<H>  3.8   |  22  |  0.88    Ca    10.4      10 Dec 2017 07:08        Urinalysis Basic - ( 10 Dec 2017 03:56 )    Color: Yellow / Appearance: SL Cloudy / S.010 / pH: x  Gluc: x / Ketone: NEGATIVE  / Bili: Negative / Urobili: 0.2 E.U./dL   Blood: x / Protein: Trace mg/dL / Nitrite: NEGATIVE   Leuk Esterase: NEGATIVE / RBC: < 5 /HPF / WBC < 5 /HPF   Sq Epi: x / Non Sq Epi: 5-10 /HPF / Bacteria: Many /HPF        RADIOLOGY & ADDITIONAL TESTS:    Culture - Blood (collected 12-10-17 @ 08:34)  Source: .Blood None  Preliminary Report (12-10-17 @ 21:01):    No growth at 12 hours HOSPITAL COURSE:  89F PMH breast cancer, AAA repair, GERD, HTN, anxiety, recent admission for sepsis 2/2 aspiration pneumonia, found to have hypercalcemia and lytic bone lesion w/ associated suspicious right lung mass(s/p bx ) who presents w/ slurred speech. Pt outside the window for tPA.  Symptoms resolved on presentation NIHSS 0. 10-system ROS negative except for left rib pain  In the ED VS(T:99.1F HR:91 BP:117/72 RR:18 O2%:94 on RA ). Labs significant for WBC 14.8, H/H 8.2/25, Na 128, Ca 11.8(qing), UA w/ WBC, LE, bacteria. CTH revealed bony mets and lacunar infarcts, MRI revealed bony mets and multiple acute/subacute infarcts consistent with embolic phenomenon v. hypercoagulable state.  MRA negative.  Admitted to  for acute stroke. Pt had a fever on . Fever workup started, Blood cxs NGTD. Pt started on Vanc/zosyn.     INTERVAL HPI/OVERNIGHT EVENTS:    ROS  CV: Denies chest pain, palpitations  RESP: Denies SOB  GI: Denies abdominal pain, constipation, diarrhea, nausea, vomiting  : Denies dysuria, hematuria, flank or back pain  ID: Denies fevers, chills  MSK: Denies joint pain   DERM: Denies any rashes, bruising, pruritis  NEURO: No headaches, blurry vision, double vision  ENDO: Denies heat or cold intolerances, changes in weight, thinning of hair  PSYCH: Denies any mood changes    VITAL SIGNS:  T(F): 97.6 (17 @ 05:59), Max: 101 (12-10-17 @ 23:00)  HR: 79 (17 @ 05:15) (56 - 86)  BP: 110/59 (17 @ 05:15) (92/53 - 160/77)  BP(mean): 80 (17 @ 05:15) (77 - 110)  RR: 17 (17 @ 05:15) (17 - 18)  SpO2: 97% (17 @ 05:15) (94% - 99%)      12-10-17 @ 07:01  -  17 @ 06:54  --------------------------------------------------------  IN: 600 mL / OUT: 0 mL / NET: 600 mL    PHYSICAL EXAM:  Constitutional: WDWN, NAD, resting comfortably   Head: NC/AT  Eyes: PERRL, EOMI, anicteric sclera, no mucosal pallor  ENT: no nasal discharge; uvula midline, no oropharyngeal erythema or exudates; MMM  Neck: supple; no JVD or thyromegaly, no lymphadenopathy  Respiratory: CTA B/L; no W/R/R, no retractions  Cardiac: +S1/S2; RRR; no M/R/G; PMI non-displaced  Gastrointestinal: abdomen soft, NT/ND; no rebound or guarding; +BSx4  Back: spine midline, no bony tenderness or step-offs; no CVAT B/L  Extremities: warm; no calf tenderness, no pedal edema, no cyanosis, no clubbing  Musculoskeletal: NROM x4; no joint swelling, tenderness or erythema  Vascular: 2+ radial, femoral; DP/PT pulses B/L  Dermatologic: no rash, no petechia   Lymphatic: no submandibular or cervical LAD  Neurologic: Dysarthria, AAOx3; CNII-XII grossly intact; 5/5 strength throughout, sensory symmetrically intact in B/L LE   Psychiatric: pleasant and conversive; affect and characteristics of appearance, verbalizations, behaviors are appropriate    MEDICATIONS  (STANDING):  aspirin  chewable 81 milliGRAM(s) Oral daily  atorvastatin 80 milliGRAM(s) Oral at bedtime  cyanocobalamin 500 MICROGram(s) Oral daily  levothyroxine 75 MICROGram(s) Oral daily  metoprolol     tartrate 50 milliGRAM(s) Oral two times a day  pantoprazole  Injectable 40 milliGRAM(s) IV Push daily  piperacillin/tazobactam IVPB. 3.375 Gram(s) IV Intermittent every 6 hours  sodium chloride 0.9%. 1000 milliLiter(s) (60 mL/Hr) IV Continuous <Continuous>  vancomycin  IVPB 750 milliGRAM(s) IV Intermittent every 24 hours    MEDICATIONS  (PRN):  acetaminophen   Tablet 650 milliGRAM(s) Oral every 6 hours PRN For Temp greater than 38 C (100.4 F)      Allergies    No Known Allergies    Intolerances        LABS:                         7.0    9.4   )-----------( 304      ( 11 Dec 2017 06:43 )             22.7                        7.1    9.8   )-----------( 277      ( 10 Dec 2017 07:08 )             22.6   12-11    134<L>  |  102  |  19  ----------------------------<  100<H>  3.8   |  21<L>  |  0.95    Ca    9.7      11 Dec 2017 06:43      12-10    129<L>  |  98  |  17  ----------------------------<  174<H>  3.8   |  22  |  0.88    Ca    10.4      10 Dec 2017 07:08    Urinalysis Basic - ( 10 Dec 2017 03:56 )    Color: Yellow / Appearance: SL Cloudy / S.010 / pH: x  Gluc: x / Ketone: NEGATIVE  / Bili: Negative / Urobili: 0.2 E.U./dL   Blood: x / Protein: Trace mg/dL / Nitrite: NEGATIVE   Leuk Esterase: NEGATIVE / RBC: < 5 /HPF / WBC < 5 /HPF   Sq Epi: x / Non Sq Epi: 5-10 /HPF / Bacteria: Many /HPF        RADIOLOGY & ADDITIONAL TESTS:    Culture - Blood (collected 12-10-17 @ 08:34)  Source: .Blood None  Preliminary Report (12-10-17 @ 21:01):    No growth at 12 hours HOSPITAL COURSE:  89F PMH breast cancer, AAA repair, GERD, HTN, anxiety, recent admission for sepsis 2/2 aspiration pneumonia, found to have hypercalcemia and lytic bone lesion w/ associated suspicious right lung mass(s/p bx ) who presents w/ slurred speech. Pt outside the window for tPA.  Symptoms resolved on presentation NIHSS 0. 10-system ROS negative except for left rib pain  In the ED VS(T:99.1F HR:91 BP:117/72 RR:18 O2%:94 on RA ). Labs significant for WBC 14.8, H/H 8.2/25, Na 128, Ca 11.8(qing), UA w/ WBC, LE, bacteria. CTH revealed multiple bony lesions which are new from the 2017 study and may represent bony metastases, MRI revealed bony mets and multiple acute/subacute infarcts consistent with embolic phenomenon v. hypercoagulable state.  MRA negative.  Admitted to  for acute stroke. ECHO w/ bubble showed intraatrial shunt.  Pt had a fever on , chest X ray from 12/10 with progression left effusion in comparison to prior examination of the chest 2017. Fever workup started, Blood cxs NGTD. Pt started on Vanc/zosyn.     INTERVAL HPI/OVERNIGHT EVENTS:    ROS  CV: Denies chest pain, palpitations  RESP: Denies SOB  GI: Denies abdominal pain, constipation, diarrhea, nausea, vomiting  : Denies dysuria, hematuria, flank or back pain  ID: Denies fevers, chills  MSK: Denies joint pain   DERM: Denies any rashes, bruising, pruritis  NEURO: No headaches, blurry vision, double vision  ENDO: Denies heat or cold intolerances, changes in weight, thinning of hair  PSYCH: Denies any mood changes    VITAL SIGNS:  T(F): 97.6 (17 @ 05:59), Max: 101 (12-10-17 @ 23:00)  HR: 79 (17 @ 05:15) (56 - 86)  BP: 110/59 (17 @ 05:15) (92/53 - 160/77)  BP(mean): 80 (17 @ 05:15) (77 - 110)  RR: 17 (17 @ 05:15) (17 - 18)  SpO2: 97% (17 @ 05:15) (94% - 99%)      12-10-17 @ 07:01  -  17 @ 06:54  --------------------------------------------------------  IN: 600 mL / OUT: 0 mL / NET: 600 mL    PHYSICAL EXAM:  Constitutional: WDWN, NAD, resting comfortably   Head: NC/AT  Eyes: PERRL, EOMI, anicteric sclera, no mucosal pallor  ENT: no nasal discharge; uvula midline, no oropharyngeal erythema or exudates; MMM  Neck: supple; no JVD or thyromegaly, no lymphadenopathy  Respiratory: CTA B/L; no W/R/R, no retractions  Cardiac: +S1/S2; RRR; no M/R/G; PMI non-displaced  Gastrointestinal: abdomen soft, NT/ND; no rebound or guarding; +BSx4  Back: spine midline, no bony tenderness or step-offs; no CVAT B/L  Extremities: warm; no calf tenderness, no pedal edema, no cyanosis, no clubbing  Musculoskeletal: NROM x4; no joint swelling, tenderness or erythema  Vascular: 2+ radial, femoral; DP/PT pulses B/L  Dermatologic: no rash, no petechia   Lymphatic: no submandibular or cervical LAD  Neurologic: Dysarthria, AAOx3; CNII-XII grossly intact; 5/5 strength throughout, sensory symmetrically intact in B/L LE   Psychiatric: pleasant and conversive; affect and characteristics of appearance, verbalizations, behaviors are appropriate    MEDICATIONS  (STANDING):  aspirin  chewable 81 milliGRAM(s) Oral daily  atorvastatin 80 milliGRAM(s) Oral at bedtime  cyanocobalamin 500 MICROGram(s) Oral daily  levothyroxine 75 MICROGram(s) Oral daily  metoprolol     tartrate 50 milliGRAM(s) Oral two times a day  pantoprazole  Injectable 40 milliGRAM(s) IV Push daily  piperacillin/tazobactam IVPB. 3.375 Gram(s) IV Intermittent every 6 hours  sodium chloride 0.9%. 1000 milliLiter(s) (60 mL/Hr) IV Continuous <Continuous>  vancomycin  IVPB 750 milliGRAM(s) IV Intermittent every 24 hours    MEDICATIONS  (PRN):  acetaminophen   Tablet 650 milliGRAM(s) Oral every 6 hours PRN For Temp greater than 38 C (100.4 F)      Allergies    No Known Allergies    Intolerances        LABS:                         7.0    9.4   )-----------( 304      ( 11 Dec 2017 06:43 )             22.7                        7.1    9.8   )-----------( 277      ( 10 Dec 2017 07:08 )             22.6   12-11    134<L>  |  102  |  19  ----------------------------<  100<H>  3.8   |  21<L>  |  0.95    Ca    9.7      11 Dec 2017 06:43      12-10    129<L>  |  98  |  17  ----------------------------<  174<H>  3.8   |  22  |  0.88    Ca    10.4      10 Dec 2017 07:08    Urinalysis Basic - ( 10 Dec 2017 03:56 )    Color: Yellow / Appearance: SL Cloudy / S.010 / pH: x  Gluc: x / Ketone: NEGATIVE  / Bili: Negative / Urobili: 0.2 E.U./dL   Blood: x / Protein: Trace mg/dL / Nitrite: NEGATIVE   Leuk Esterase: NEGATIVE / RBC: < 5 /HPF / WBC < 5 /HPF   Sq Epi: x / Non Sq Epi: 5-10 /HPF / Bacteria: Many /HPF        RADIOLOGY & ADDITIONAL TESTS:    Culture - Blood (collected 12-10-17 @ 08:34)  Source: .Blood None  Preliminary Report (12-10-17 @ 21:01):    No growth at 12 hours HOSPITAL COURSE:  89F PMH breast cancer, AAA repair, GERD, HTN, anxiety, recent admission for sepsis 2/2 aspiration pneumonia, found to have hypercalcemia and lytic bone lesion w/ associated suspicious right lung mass(s/p bx ) who presents w/ slurred speech. Pt outside the window for tPA.  Symptoms resolved on presentation NIHSS 0. 10-system ROS negative except for left rib pain  In the ED VS(T:99.1F HR:91 BP:117/72 RR:18 O2%:94 on RA ). Labs significant for WBC 14.8, H/H 8.2/25, Na 128, Ca 11.8(qing), UA w/ WBC, LE, bacteria. CTH revealed multiple bony lesions which are new from the 2017 study and may represent bony metastases, MRI revealed bony mets and multiple acute/subacute infarcts consistent with embolic phenomenon v. hypercoagulable state.  MRA negative.  Admitted to  for acute stroke. ECHO w/ bubble showed intraatrial shunt.  Pt had a fever on , chest X ray from 12/10 with progression left effusion in comparison to prior examination of the chest 2017. Fever workup started, Blood cxs NGTD. Pt started on Vanc/zosyn.     INTERVAL HPI/OVERNIGHT EVENTS:     ROS  CV: Denies chest pain, palpitations  RESP: Denies SOB  GI: Denies abdominal pain, constipation, diarrhea, nausea, vomiting  : Denies dysuria, hematuria, flank or back pain  ID: Denies fevers, chills  MSK: Denies joint pain   DERM: Denies any rashes, bruising, pruritis  NEURO: No headaches, blurry vision, double vision  ENDO: Denies heat or cold intolerances, changes in weight, thinning of hair  PSYCH: Denies any mood changes    VITAL SIGNS:  T(F): 97.6 (17 @ 05:59), Max: 101 (12-10-17 @ 23:00)  HR: 79 (17 @ 05:15) (56 - 86)  BP: 110/59 (17 @ 05:15) (92/53 - 160/77)  BP(mean): 80 (17 @ 05:15) (77 - 110)  RR: 17 (17 @ 05:15) (17 - 18)  SpO2: 97% (17 @ 05:15) (94% - 99%)      12-10-17 @ 07:01  -  17 @ 06:54  --------------------------------------------------------  IN: 600 mL / OUT: 0 mL / NET: 600 mL    PHYSICAL EXAM:  Constitutional: WDWN, NAD, resting comfortably   Head: NC/AT  Eyes: PERRL, EOMI, anicteric sclera, no mucosal pallor  ENT: no nasal discharge; uvula midline, no oropharyngeal erythema or exudates; MMM  Neck: supple; no JVD or thyromegaly, no lymphadenopathy  Respiratory: CTA B/L; no W/R/R, no retractions  Cardiac: +S1/S2; RRR; no M/R/G; PMI non-displaced  Gastrointestinal: abdomen soft, NT/ND; no rebound or guarding; +BSx4  Back: spine midline, no bony tenderness or step-offs; no CVAT B/L  Extremities: warm; no calf tenderness, no pedal edema, no cyanosis, no clubbing  Musculoskeletal: NROM x4; no joint swelling, tenderness or erythema  Vascular: 2+ radial, femoral; DP/PT pulses B/L  Dermatologic: no rash, no petechia   Lymphatic: no submandibular or cervical LAD  Neurologic: Dysarthria, AAOx3; CNII-XII grossly intact; 5/5 strength throughout, sensory symmetrically intact in B/L LE   Psychiatric: pleasant and conversive; affect and characteristics of appearance, verbalizations, behaviors are appropriate    MEDICATIONS  (STANDING):  aspirin  chewable 81 milliGRAM(s) Oral daily  atorvastatin 80 milliGRAM(s) Oral at bedtime  cyanocobalamin 500 MICROGram(s) Oral daily  levothyroxine 75 MICROGram(s) Oral daily  metoprolol     tartrate 50 milliGRAM(s) Oral two times a day  pantoprazole  Injectable 40 milliGRAM(s) IV Push daily  piperacillin/tazobactam IVPB. 3.375 Gram(s) IV Intermittent every 6 hours  sodium chloride 0.9%. 1000 milliLiter(s) (60 mL/Hr) IV Continuous <Continuous>  vancomycin  IVPB 750 milliGRAM(s) IV Intermittent every 24 hours    MEDICATIONS  (PRN):  acetaminophen   Tablet 650 milliGRAM(s) Oral every 6 hours PRN For Temp greater than 38 C (100.4 F)      Allergies    No Known Allergies    Intolerances        LABS:                         7.0    9.4   )-----------( 304      ( 11 Dec 2017 06:43 )             22.7                        7.1    9.8   )-----------( 277      ( 10 Dec 2017 07:08 )             22.6   12-11    134<L>  |  102  |  19  ----------------------------<  100<H>  3.8   |  21<L>  |  0.95    Ca    9.7      11 Dec 2017 06:43      12-10    129<L>  |  98  |  17  ----------------------------<  174<H>  3.8   |  22  |  0.88    Ca    10.4      10 Dec 2017 07:08    Urinalysis Basic - ( 10 Dec 2017 03:56 )    Color: Yellow / Appearance: SL Cloudy / S.010 / pH: x  Gluc: x / Ketone: NEGATIVE  / Bili: Negative / Urobili: 0.2 E.U./dL   Blood: x / Protein: Trace mg/dL / Nitrite: NEGATIVE   Leuk Esterase: NEGATIVE / RBC: < 5 /HPF / WBC < 5 /HPF   Sq Epi: x / Non Sq Epi: 5-10 /HPF / Bacteria: Many /HPF        RADIOLOGY & ADDITIONAL TESTS:    Culture - Blood (collected 12-10-17 @ 08:34)  Source: .Blood None  Preliminary Report (12-10-17 @ 21:01):    No growth at 12 hours HOSPITAL COURSE:  89F PMH breast cancer, AAA repair, GERD, HTN, anxiety, recent admission for sepsis 2/2 aspiration pneumonia, found to have hypercalcemia and lytic bone lesion w/ associated suspicious right lung mass(s/p bx ) who presents w/ slurred speech. Pt outside the window for tPA.  Symptoms resolved on presentation NIHSS 0. 10-system ROS negative except for left rib pain  In the ED VS(T:99.1F HR:91 BP:117/72 RR:18 O2%:94 on RA ). Labs significant for WBC 14.8, H/H 8.2/25, Na 128, Ca 11.8(qing), UA w/ WBC, LE, bacteria. CTH revealed multiple bony lesions which are new from the 2017 study and may represent bony metastases, MRI revealed bony mets and multiple acute/subacute infarcts consistent with embolic phenomenon v. hypercoagulable state.  MRA negative.  Admitted to  for acute stroke. ECHO w/ bubble showed intraatrial shunt.  Pt had a fever on , chest X ray from 12/10 with progression left effusion in comparison to prior examination of the chest 2017. Fever workup started, Blood cxs NGTD. Pt started on Vanc/zosyn.     INTERVAL HPI/OVERNIGHT EVENTS:     ROS  CV: Denies chest pain, palpitations  RESP: Denies SOB  GI: Denies abdominal pain, constipation, diarrhea, nausea, vomiting  : Denies dysuria, hematuria, flank or back pain  ID: Denies fevers, chills  MSK: Denies joint pain   DERM: Denies any rashes, bruising, pruritis  NEURO: No headaches, blurry vision, double vision  ENDO: Denies heat or cold intolerances, changes in weight, thinning of hair  PSYCH: Denies any mood changes    VITAL SIGNS:  T(F): 97.6 (17 @ 05:59), Max: 101 (12-10-17 @ 23:00)  HR: 79 (17 @ 05:15) (56 - 86)  BP: 110/59 (17 @ 05:15) (92/53 - 160/77)  BP(mean): 80 (17 @ 05:15) (77 - 110)  RR: 17 (17 @ 05:15) (17 - 18)  SpO2: 97% (17 @ 05:15) (94% - 99%)      12-10-17 @ 07:01  -  17 @ 06:54  --------------------------------------------------------  IN: 600 mL / OUT: 0 mL / NET: 600 mL    PHYSICAL EXAM:  Constitutional: WDWN, NAD, resting comfortably   Head: NC/AT  Eyes: PERRL, EOMI, anicteric sclera, no mucosal pallor  ENT: no nasal discharge; uvula midline, no oropharyngeal erythema or exudates; MMM  Neck: supple; no JVD or thyromegaly, no lymphadenopathy  Respiratory: CTA B/L; no W/R/R, no retractions  Cardiac: +S1/S2; RRR; no M/R/G; PMI non-displaced  Gastrointestinal: abdomen soft, NT/ND; no rebound or guarding; +BSx4  Back: spine midline, no bony tenderness or step-offs; no CVAT B/L  Extremities: warm; no calf tenderness, no pedal edema, no cyanosis, no clubbing  Musculoskeletal: NROM x4; no joint swelling, tenderness or erythema  Vascular: 2+ radial, femoral; DP/PT pulses B/L  Dermatologic: no rash, no petechia   Lymphatic: no submandibular or cervical LAD  Neurologic: Dysarthria, AAOx3; CNII-XII grossly intact; 5/5 strength throughout, sensory symmetrically intact in B/L LE   Psychiatric: pleasant and conversive; affect and characteristics of appearance, verbalizations, behaviors are appropriate    MEDICATIONS  (STANDING):  aspirin  chewable 81 milliGRAM(s) Oral daily  atorvastatin 80 milliGRAM(s) Oral at bedtime  cyanocobalamin 500 MICROGram(s) Oral daily  levothyroxine 75 MICROGram(s) Oral daily  metoprolol     tartrate 50 milliGRAM(s) Oral two times a day  pantoprazole  Injectable 40 milliGRAM(s) IV Push daily  piperacillin/tazobactam IVPB. 3.375 Gram(s) IV Intermittent every 6 hours  sodium chloride 0.9%. 1000 milliLiter(s) (60 mL/Hr) IV Continuous <Continuous>  vancomycin  IVPB 750 milliGRAM(s) IV Intermittent every 24 hours    MEDICATIONS  (PRN):  acetaminophen   Tablet 650 milliGRAM(s) Oral every 6 hours PRN For Temp greater than 38 C (100.4 F)      Allergies    No Known Allergies    LABS:                         7.0    9.4   )-----------( 304      ( 11 Dec 2017 06:43 )             22.7                        7.1    9.8   )-----------( 277      ( 10 Dec 2017 07:08 )             22.6   12-11    134<L>  |  102  |  19  ----------------------------<  100<H>  3.8   |  21<L>  |  0.95    Ca    9.7      11 Dec 2017 06:43      12-10    129<L>  |  98  |  17  ----------------------------<  174<H>  3.8   |  22  |  0.88    Ca    10.4      10 Dec 2017 07:08    Urinalysis Basic - ( 10 Dec 2017 03:56 )    Color: Yellow / Appearance: SL Cloudy / S.010 / pH: x  Gluc: x / Ketone: NEGATIVE  / Bili: Negative / Urobili: 0.2 E.U./dL   Blood: x / Protein: Trace mg/dL / Nitrite: NEGATIVE   Leuk Esterase: NEGATIVE / RBC: < 5 /HPF / WBC < 5 /HPF   Sq Epi: x / Non Sq Epi: 5-10 /HPF / Bacteria: Many /HPF        RADIOLOGY & ADDITIONAL TESTS:    Culture - Blood (collected 12-10-17 @ 08:34)  Source: .Blood None  Preliminary Report (12-10-17 @ 21:01):    No growth at 12 hours

## 2017-12-11 NOTE — PROGRESS NOTE ADULT - PROBLEM SELECTOR PLAN 10
Diet: DASH/TLC  monitor/ replete electrolytes K>4, Mg>2   IVF @ 60cc/hr     DNR/DNI    Dispo: [facility]

## 2017-12-11 NOTE — CONSULT NOTE ADULT - PROBLEM SELECTOR RECOMMENDATION 9
no further symptoms.  treatments still being clarified  The role of a loop recorder to monitor for the presence of arrythmia are being considered by family.  HCP Bethel wishes to discuss further with Dr Solis as she is not certain how to procede.

## 2017-12-11 NOTE — CONSULT NOTE ADULT - PROBLEM SELECTOR RECOMMENDATION 5
pt to receive supportive care rather than chemotherapy or radition  Hospice care discussed as an extra layer of support if pt declines despite the supportive care offered by Dr Roper.  Family appears open to supportive care wit a plan to transition to home hospice care when/if pt declines further.

## 2017-12-11 NOTE — PROGRESS NOTE ADULT - PROBLEM SELECTOR PLAN 3
recent hospitalization for hypercalcemia in setting of malignancy. 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 Ca normal this admission. Managed by Dr. Valdez.   -recent labs show low PTH, low PTHrP  -ionized calcium elevated (1.52)  -no changes on telemetry/EKG  -monitor electrolytes, IVF hydration   -f/u with Dr. Valdez rec's in AM    #Hyponatremia: f/u urine lytes and serum osmole     # Anemia: Hb ~7. f/u FOBT.  c/w 40 mg IV protonix PO daily recent hospitalization for hypercalcemia in setting of malignancy. 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 Ca normal this admission. Managed by Dr. Valdez.   -recent labs show low PTH, low PTHrP  -ionized calcium elevated (1.52)  -no changes on telemetry/EKG  -monitor electrolytes, IVF hydration   -f/u with Dr. Valdez rec's     #Hyponatremia: f/u urine lytes and serum osmole     # Anemia: Hb ~7. f/u FOBT.  c/w 40 mg IV protonix PO daily recent hospitalization for hypercalcemia in setting of malignancy. 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 Ca normal this admission. Managed by Dr. Valdez.   -recent labs show low PTH, low PTHrP  -ionized calcium elevated (1.52)  -no changes on telemetry/EKG  -monitor electrolytes, IVF hydration   -f/u with Dr. Valdez rec's     #Hyponatremia: f/u urine lytes and serum osmole     # Anemia: Hb ~7; stable on repeat, f/u FOBT.  c/w 40 mg IV protonix PO daily

## 2017-12-11 NOTE — CONSULT NOTE ADULT - PROBLEM SELECTOR RECOMMENDATION 6
Support provided to patient and family. Patient to have access to supportive services during rest of hospital stay as the patient/family deemed necessary ie.  Patient and family supportive services, Palliative SW, etc.

## 2017-12-11 NOTE — PROGRESS NOTE ADULT - PROBLEM SELECTOR PLAN 1
Slurred speech and lower extremity weakness this AM; last known normal last night (12/08/17) @ 7pm. 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 Patient only on simvastatin 10mg at home. Symptoms resolved while in ED. MRA H/N without occlusion   - NIHSS 0  - s/p  and Lipitor 80mg in ED. Will c/w ASA 81mg daily & Lipitor 80mg qhs  - echocardiogram w/ bubble showed intraatrial shunt. Will consider consult cards for possible closure of PFO Vs ASD after goals of care discussion with family  -LILY for further workup of cardioembolic etiology  -LE duplex for DVT rule out  - PT/OT: home w/ assist; home w/ home PT; with 24 hour assist  - stroke education  - DVT ppx   - telemetry monitoring   - passed bedside dysphagia, started on diet  -ASA/ statin Slurred speech and lower extremity weakness this AM; last known normal last night (12/08/17) @ 7pm. 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 Patient only on simvastatin 10mg at home. Symptoms resolved while in ED. MRA H/N without occlusion   - NIHSS 0  - s/p  and Lipitor 80mg in ED. Will c/w ASA 81mg daily & Lipitor 80mg qhs  - echocardiogram w/ bubble showed intraatrial shunt. Will consider consult cards for possible closure of PFO Vs ASD after goals of care discussion with family  -LILY for further workup of cardioembolic etiology  -LE duplex for DVT rule out  - PT/OT: home w/ assist; home w/ home PT; with 24 hour assist  - stroke education  - DVT ppx    - passed bedside dysphagia, started on diet  -ASA/ statin Slurred speech and lower extremity weakness this AM; last known normal last night (12/08/17) @ 7pm. 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 Patient only on simvastatin 10mg at home. Symptoms resolved while in ED. MRA H/N without occlusion   - NIHSS 0  - s/p  and Lipitor 80mg in ED. Will c/w ASA 81mg daily & Lipitor 80mg qhs  - echocardiogram w/ bubble showed intraatrial shunt. Will consider consult cards for possible closure of PFO Vs ASD after goals of care discussion with family  -LILY for further workup of cardioembolic etiology  -LE duplex for DVT rule out  - PT/OT: home w/ assist; home w/ home PT; with 24 hour assist  - stroke education  - DVT ppx    - passed bedside dysphagia, started on diet  -ASA/ statin  -Possible loop recorder placement

## 2017-12-12 LAB
ANION GAP SERPL CALC-SCNC: 11 MMOL/L — SIGNIFICANT CHANGE UP (ref 5–17)
ANION GAP SERPL CALC-SCNC: 14 MMOL/L — SIGNIFICANT CHANGE UP (ref 5–17)
BUN SERPL-MCNC: 16 MG/DL — SIGNIFICANT CHANGE UP (ref 7–23)
BUN SERPL-MCNC: 17 MG/DL — SIGNIFICANT CHANGE UP (ref 7–23)
CALCIUM SERPL-MCNC: 10 MG/DL — SIGNIFICANT CHANGE UP (ref 8.4–10.5)
CALCIUM SERPL-MCNC: 9.2 MG/DL — SIGNIFICANT CHANGE UP (ref 8.4–10.5)
CHLORIDE SERPL-SCNC: 103 MMOL/L — SIGNIFICANT CHANGE UP (ref 96–108)
CHLORIDE SERPL-SCNC: 99 MMOL/L — SIGNIFICANT CHANGE UP (ref 96–108)
CHOLEST SERPL-MCNC: 90 MG/DL — SIGNIFICANT CHANGE UP (ref 10–199)
CK MB CFR SERPL CALC: 1.1 NG/ML — SIGNIFICANT CHANGE UP (ref 0–6.7)
CK SERPL-CCNC: 21 U/L — LOW (ref 25–170)
CO2 SERPL-SCNC: 18 MMOL/L — LOW (ref 22–31)
CO2 SERPL-SCNC: 20 MMOL/L — LOW (ref 22–31)
CREAT SERPL-MCNC: 1.01 MG/DL — SIGNIFICANT CHANGE UP (ref 0.5–1.3)
CREAT SERPL-MCNC: 1.02 MG/DL — SIGNIFICANT CHANGE UP (ref 0.5–1.3)
GLUCOSE SERPL-MCNC: 123 MG/DL — HIGH (ref 70–99)
GLUCOSE SERPL-MCNC: 91 MG/DL — SIGNIFICANT CHANGE UP (ref 70–99)
HCT VFR BLD CALC: 20.7 % — CRITICAL LOW (ref 34.5–45)
HDLC SERPL-MCNC: 24 MG/DL — LOW (ref 40–125)
HGB BLD-MCNC: 6.6 G/DL — CRITICAL LOW (ref 11.5–15.5)
LACTATE SERPL-SCNC: 1.6 MMOL/L — SIGNIFICANT CHANGE UP (ref 0.5–2)
LIPID PNL WITH DIRECT LDL SERPL: 48 MG/DL — SIGNIFICANT CHANGE UP
MAGNESIUM SERPL-MCNC: 1.8 MG/DL — SIGNIFICANT CHANGE UP (ref 1.6–2.6)
MAGNESIUM SERPL-MCNC: 1.9 MG/DL — SIGNIFICANT CHANGE UP (ref 1.6–2.6)
MCHC RBC-ENTMCNC: 29.3 PG — SIGNIFICANT CHANGE UP (ref 27–34)
MCHC RBC-ENTMCNC: 31.9 G/DL — LOW (ref 32–36)
MCV RBC AUTO: 92 FL — SIGNIFICANT CHANGE UP (ref 80–100)
NT-PROBNP SERPL-SCNC: HIGH PG/ML (ref 0–300)
PHOSPHATE SERPL-MCNC: 2.5 MG/DL — SIGNIFICANT CHANGE UP (ref 2.5–4.5)
PLATELET # BLD AUTO: 268 K/UL — SIGNIFICANT CHANGE UP (ref 150–400)
POTASSIUM SERPL-MCNC: 4.2 MMOL/L — SIGNIFICANT CHANGE UP (ref 3.5–5.3)
POTASSIUM SERPL-MCNC: 4.3 MMOL/L — SIGNIFICANT CHANGE UP (ref 3.5–5.3)
POTASSIUM SERPL-SCNC: 4.2 MMOL/L — SIGNIFICANT CHANGE UP (ref 3.5–5.3)
POTASSIUM SERPL-SCNC: 4.3 MMOL/L — SIGNIFICANT CHANGE UP (ref 3.5–5.3)
RBC # BLD: 2.25 M/UL — LOW (ref 3.8–5.2)
RBC # FLD: 16.6 % — SIGNIFICANT CHANGE UP (ref 10.3–16.9)
SODIUM SERPL-SCNC: 131 MMOL/L — LOW (ref 135–145)
SODIUM SERPL-SCNC: 134 MMOL/L — LOW (ref 135–145)
TOTAL CHOLESTEROL/HDL RATIO MEASUREMENT: 3.8 RATIO — SIGNIFICANT CHANGE UP (ref 3.3–7.1)
TRIGL SERPL-MCNC: 89 MG/DL — SIGNIFICANT CHANGE UP (ref 10–149)
TROPONIN T SERPL-MCNC: 0.02 NG/ML — HIGH (ref 0–0.01)
VANCOMYCIN TROUGH SERPL-MCNC: 12.1 UG/ML — SIGNIFICANT CHANGE UP (ref 10–20)
WBC # BLD: 9.8 K/UL — SIGNIFICANT CHANGE UP (ref 3.8–10.5)
WBC # FLD AUTO: 9.8 K/UL — SIGNIFICANT CHANGE UP (ref 3.8–10.5)

## 2017-12-12 PROCEDURE — 71010: CPT | Mod: 26

## 2017-12-12 PROCEDURE — 99233 SBSQ HOSP IP/OBS HIGH 50: CPT

## 2017-12-12 PROCEDURE — 86078 PHYS BLOOD BANK SERV REACTJ: CPT

## 2017-12-12 RX ORDER — DOCUSATE SODIUM 100 MG
100 CAPSULE ORAL
Qty: 0 | Refills: 0 | Status: DISCONTINUED | OUTPATIENT
Start: 2017-12-12 | End: 2017-12-13

## 2017-12-12 RX ORDER — LABETALOL HCL 100 MG
10 TABLET ORAL ONCE
Qty: 0 | Refills: 0 | Status: COMPLETED | OUTPATIENT
Start: 2017-12-12 | End: 2017-12-12

## 2017-12-12 RX ORDER — VANCOMYCIN HCL 1 G
750 VIAL (EA) INTRAVENOUS ONCE
Qty: 0 | Refills: 0 | Status: COMPLETED | OUTPATIENT
Start: 2017-12-12 | End: 2017-12-12

## 2017-12-12 RX ORDER — FUROSEMIDE 40 MG
10 TABLET ORAL ONCE
Qty: 0 | Refills: 0 | Status: COMPLETED | OUTPATIENT
Start: 2017-12-12 | End: 2017-12-12

## 2017-12-12 RX ADMIN — PANTOPRAZOLE SODIUM 40 MILLIGRAM(S): 20 TABLET, DELAYED RELEASE ORAL at 12:49

## 2017-12-12 RX ADMIN — Medication 81 MILLIGRAM(S): at 12:51

## 2017-12-12 RX ADMIN — Medication 50 MILLIGRAM(S): at 07:01

## 2017-12-12 RX ADMIN — Medication 75 MICROGRAM(S): at 07:01

## 2017-12-12 RX ADMIN — PIPERACILLIN AND TAZOBACTAM 200 GRAM(S): 4; .5 INJECTION, POWDER, LYOPHILIZED, FOR SOLUTION INTRAVENOUS at 01:08

## 2017-12-12 RX ADMIN — Medication 650 MILLIGRAM(S): at 22:00

## 2017-12-12 RX ADMIN — LISINOPRIL 2.5 MILLIGRAM(S): 2.5 TABLET ORAL at 07:01

## 2017-12-12 RX ADMIN — POLYETHYLENE GLYCOL 3350 17 GRAM(S): 17 POWDER, FOR SOLUTION ORAL at 12:51

## 2017-12-12 RX ADMIN — Medication 100 MILLIGRAM(S): at 07:01

## 2017-12-12 RX ADMIN — HEPARIN SODIUM 5000 UNIT(S): 5000 INJECTION INTRAVENOUS; SUBCUTANEOUS at 18:48

## 2017-12-12 RX ADMIN — Medication 50 MILLIGRAM(S): at 18:49

## 2017-12-12 RX ADMIN — ATORVASTATIN CALCIUM 80 MILLIGRAM(S): 80 TABLET, FILM COATED ORAL at 23:02

## 2017-12-12 RX ADMIN — HEPARIN SODIUM 5000 UNIT(S): 5000 INJECTION INTRAVENOUS; SUBCUTANEOUS at 07:02

## 2017-12-12 RX ADMIN — Medication 10 MILLIGRAM(S): at 21:38

## 2017-12-12 RX ADMIN — Medication 650 MILLIGRAM(S): at 01:18

## 2017-12-12 RX ADMIN — PIPERACILLIN AND TAZOBACTAM 200 GRAM(S): 4; .5 INJECTION, POWDER, LYOPHILIZED, FOR SOLUTION INTRAVENOUS at 07:02

## 2017-12-12 RX ADMIN — Medication 250 MILLIGRAM(S): at 18:48

## 2017-12-12 RX ADMIN — Medication 100 MILLIGRAM(S): at 18:49

## 2017-12-12 RX ADMIN — PIPERACILLIN AND TAZOBACTAM 200 GRAM(S): 4; .5 INJECTION, POWDER, LYOPHILIZED, FOR SOLUTION INTRAVENOUS at 12:51

## 2017-12-12 RX ADMIN — Medication 10 MILLIGRAM(S): at 21:53

## 2017-12-12 RX ADMIN — PREGABALIN 500 MICROGRAM(S): 225 CAPSULE ORAL at 12:51

## 2017-12-12 NOTE — PROGRESS NOTE ADULT - PROBLEM SELECTOR PLAN 2
Overnight T 101F rectally. CXR with worsening infiltrate  - Started on vanc/zosyn daily through   - BCx, UCx NGTD, RVP negative Had Temp 101F rectally two nights ago. CXR with worsening infiltrate  - Started on vanc/zosyn daily - will need to evaluate how many days of antibiotics  - BCx, UCx NGTD, RVP negative

## 2017-12-12 NOTE — PROGRESS NOTE ADULT - SUBJECTIVE AND OBJECTIVE BOX
Physical Medicine and Rehabilitation Progress Note:    Patient is a 89y old  Female who presents with a chief complaint of slurring of speech/admitted for acute stroke (08 Dec 2017 23:29)      HPI:  89F PMH breast cancer, AAA repair, GERD, HTN, anxiety, recent admission for sepsis 2/2 aspiration pneumonia, found to have hypercalcemia and lytic bone lesion w/ associated suspicious right lung mass(s/p bx 11/29) who presents w/ slurred speech noted this morning.  Pt. outside the window for tPA.  Symptoms resolved on presentation NIHSS 0. 10-system ROS negative except for left rib pain    In the ED VS(T:99.1F HR:91 BP:117/72 RR:18 O2%:94 on RA ). Labs significant for WBC 14.8, H/H 8.2/25, Na 128, Ca 11.8(qing), UA w/ WBC, LE, bacteria. CTH revealed bony mets and lacunar infarcts, MRI revealed bony mets and multiple acute/subacute infarcts consistent with embolic phenomenon v. hypercoagulable state.  MRA negative.  Admitted to  for acute stroke. (08 Dec 2017 23:29)                            6.6    9.8   )-----------( 268      ( 12 Dec 2017 06:50 )             20.7       12-12    134<L>  |  103  |  17  ----------------------------<  91  4.2   |  20<L>  |  1.01    Ca    9.2      12 Dec 2017 06:50  Phos  2.5     12-12  Mg     1.8     12-12      Vital Signs Last 24 Hrs  T(C): 36.7 (12 Dec 2017 13:36), Max: 36.7 (12 Dec 2017 01:51)  T(F): 98.1 (12 Dec 2017 13:36), Max: 98.1 (12 Dec 2017 01:51)  HR: 66 (12 Dec 2017 08:18) (66 - 100)  BP: 145/68 (12 Dec 2017 08:18) (145/68 - 169/77)  BP(mean): 98 (12 Dec 2017 08:18) (98 - 110)  RR: 18 (12 Dec 2017 08:18) (16 - 18)  SpO2: 94% (12 Dec 2017 08:18) (94% - 98%)    MEDICATIONS  (STANDING):  aspirin  chewable 81 milliGRAM(s) Oral daily  atorvastatin 80 milliGRAM(s) Oral at bedtime  cyanocobalamin 500 MICROGram(s) Oral daily  docusate sodium 100 milliGRAM(s) Oral two times a day  heparin  Injectable 5000 Unit(s) SubCutaneous every 12 hours  levothyroxine 75 MICROGram(s) Oral daily  lisinopril 2.5 milliGRAM(s) Oral daily  metoprolol     tartrate 50 milliGRAM(s) Oral two times a day  pantoprazole  Injectable 40 milliGRAM(s) IV Push daily  piperacillin/tazobactam IVPB. 3.375 Gram(s) IV Intermittent every 6 hours  polyethylene glycol 3350 17 Gram(s) Oral daily  sodium chloride 0.9%. 1000 milliLiter(s) (60 mL/Hr) IV Continuous <Continuous>    MEDICATIONS  (PRN):  acetaminophen   Tablet 650 milliGRAM(s) Oral every 6 hours PRN For Temp greater than 38 C (100.4 F)  acetaminophen   Tablet. 650 milliGRAM(s) Oral every 6 hours PRN Moderate Pain (4 - 6)  bisacodyl Suppository 10 milliGRAM(s) Rectal daily PRN Constipation    Currently Undergoing Physical Therapy at bedside.    Initial Functional Status Assessment:    Previous Level of Function:     · Ambulation Skills	needs device and assist	  · Transfer Skills	needs device and assist	  · ADL Skills	needs device and assist	  · Work/Leisure Activity	needs device and assist	  · Additional Comments	ambulates with rollator, denies falls at home, no steps upon entry, RHD, wears glasses for near sighted and farsightedness.	    Cognitive Status Examination:   · Orientation	oriented to person, place, time and situation; person; place	  · Level of Consciousness	alert; confused	  · Follows Commands and Answers Questions	50% of the time; unable to follow multi-step instructions; able to follow single-step instructions	  · Personal Safety and Judgment	impaired; required significant redirecting when turning for chair	  · Long Term Memory	impaired  unable to recall birthday	    Skin:   Skin:  · Skin Integrity	right elbow skin tear following mobilization, RN informed	    Range of Motion Exam:   · Range of Motion Examination	bilateral lower extremity ROM was WFL (within functional limits); bilateral upper extremity ROM was WFL (within functional limits)	    Manual Muscle Testing:   · Manual Muscle Testing Results	B UE and B LE >3+/5 upon functional assessment against gravity. unable to foramlly assess due to inability to follow multi step commands	    Muscle Tone Assessment:   · Muscle Tone Assessment	bilateral upper extremities; bilateral lower extremities; normal	    Bed Mobility: Rolling/Turning:     · Level of [place]	moderate assist (50% patients effort); maximum assist (25% patients effort)	  · Physical Assist/Nonphysical Assist	1 person assist	    Bed Mobility: Scooting/Bridging:     · Level of [place]	maximum assist (25% patients effort)	  · Physical Assist/Nonphysical Assist	1 person assist	    Bed Mobility: Sit to Supine:     · Level of [place]	Not tested as patient was left OOB in chair	    Bed Mobility: Supine to Sit:     · Level of [place]	maximum assist (25% patients effort)	  · Physical Assist/Nonphysical Assist	1 person assist	  · Assistive Device	bed rails; increased head of bed	    Bed Mobility Analysis:     · Bed Mobility Limitations	decreased ability to use arms for pushing/pulling; decreased ability to use legs for bridging/pushing; impaired ability to control trunk for mobility	  · Impairments Contributing to Impaired Bed Mobility	impaired balance; decreased strength	    Transfer: Sit to Stand:     · Level of [place]	moderate assist (50% patients effort)	  · Physical Assist/Nonphysical Assist	1 person assist; 2 person assist	  · Weight-Bearing Restrictions	full weight-bearing	  · Assistive Device	rolling walker	    Transfer: Stand to Sit:     · Level of [place]	moderate assist (50% patients effort)	  · Physical Assist/Nonphysical Assist	1 person assist; 2 person assist	  · Weight-Bearing Restrictions	full weight-bearing	  · Assistive Device	rolling walker	    Sit/Stand Transfer Safety Analysis:     · Transfer Safety Concerns Noted	decreased balance during turns; decreased safety awareness; losing balance; decreased sequencing ability; decreased step length; decreased weight-shifting ability; stepping too close to front of assistive device	  · Impairments Contributing to Impaired Transfers	impaired balance; cognition; narrow base of support; decreased ROM; decreased strength	    Gait Skills:     · Level of [place]	minimum assist (75% patients effort)	  · Physical Assist/Nonphysical Assist	1 person assist; 2 person assist	  · Weight-Bearing Restrictions	full weight-bearing	  · Assistive Device	rolling walker	  · Gait Distance	12 feet around the bed	    Gait Analysis:     · Gait Pattern Used	3-point gait	  · Gait Deviations Noted	decreased austyn; decreased step length; decreased stride length	  · Impairments Contributing to Gait Deviations	impaired balance; cognition; narrow base of support; decreased strength	    Balance Skills Assessment:     · Sitting Balance: Static	fair minus	  · Sitting Balance: Dynamic	fair minus	  · Sit-to-Stand Balance	poor balance	  · Standing Balance: Static	poor balance	  · Standing Balance: Dynamic	poor balance	  · Systems Impairment Contributing to Balance Disturbance	musculoskeletal; neuromuscular	  · Identified Impairments Contributing to Balance Disturbance	decreased strength; impaired postural control	    Sensory Examination:   Sensory Examination:    Grossly Intact:   · Gross Sensory Examination	Grossly Intact; Left UE; Right UE; Left LE; Right LE	      Light Touch Sensation:   · Left UE	within normal limits	  · Right UE	within normal limits	  · Left LE	within normal limits	  · Right LE	within normal limits	    · Coordination Assessed	HTS WNL, finger-nose unable to assess due to command following	      Proprioception:   · Coordination Assessed	HTS WNL, finger-nose unable to assess due to command following	      Fine Motor Coordination:   Fine Motor Coordination Examination:    Fine Motor Coordination:   · Left Hand, Finger to Nose	unable to perform  due to decreased command following	  · Right Hand, Finger to Nose	unable to perform  due to decreased command following	  · Left Hand Thumb/Finger Opposition Skills	due to decreased command following	  · Right Hand Thumb/Finger Opposition Skills	due to decreased command following	  · Left Hand, Diadochokinesis Skills	due to decreased command following	  · Right Hand, Diadochokinesis Skills	due to decreased command following	    Treatment Location:   · Comments	visual fields full, EOMI, negative nystagmus, facial sensation intact, facial muscles symmetrical, tongue midline hearing impaired bilateral, shoulder shrug/SCM intact	    Clinical Impressions:   · Criteria for Skilled Therapeutic Interventions	impairments found; functional limitations in following categories; rehab potential; therapy frequency; anticipated discharge recommendation	  · Impairments Found (describe specific impairments)	aerobic capacity/endurance; arousal, attention, and cognition; fine motor; gross motor; muscle strength; poor safety awareness; posture; ROM; gait, locomotion, and balance	  · Functional Limitations in Following Categories (describe specific limitations)	self-care; home management; community/leisure	  · Risk Reduction/Prevention (Describe Specific Areas of risk reduction/prevention)	risk factors	  · Risk Areas	fall	  · Rehab Potential	fair, will monitor progress closely	  · Therapy Frequency	2-3x/week	          PM&R Impression: as above    Disposition Plan Recommendations: d/c home with home physical therapy, resume established 24 hr x 7 days home care services

## 2017-12-12 NOTE — PROGRESS NOTE ADULT - PROBLEM SELECTOR PLAN 10
Diet: DASH/TLC  monitor/ replete electrolytes K>4, Mg>2   IVF @ 60cc/hr     DNR    Dispo: 7 [facility] Diet: DASH/TLC  monitor/ replete electrolytes K>4, Mg>2     DNR/DNI    Dispo: 7 [facility]

## 2017-12-12 NOTE — PROGRESS NOTE ADULT - ASSESSMENT
89F PMH breast cancer, AAA repair, GERD, HTN, anxiety, recent admission for sepsis 2/2 aspiration pneumonia, found to have hypercalcemia and lytic bone lesion w/ associated suspicious right lung mass(s/p bx 11/29) who presents w/ slurred speech noted this morning.      Problem/Plan - 1:  ·  Problem: CVA (cerebral vascular accident).  Plan: Slurred speech and lower extremity weakness this AM; last known normal last night (12/08/17) @ 7pm. 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 Patient only on simvastatin 10mg at home. Symptoms resolved while in ED. MRA H/N without occlusion   - NIHSS 0  - s/p  and Lipitor 80mg in ED. Will c/w ASA 81mg daily & Lipitor 80mg qhs  - echocardiogram w/ bubble showed intraatrial shunt. Will consider consult cards for possible closure of PFO Vs ASD after goals of care discussion with family  -LILY for further workup of cardioembolic etiology  -LE duplex for DVT rule out revealed No deep vein thrombosis seen above the knee. 5.3 cm heterogeneous hypoechoic structure within the left groin region   with internal debris and vascularity (inflamed and necrotic lymph node Vs evolving abscess).  - PT/OT: home w/ assist; home w/ home PT; with 24 hour assist  - stroke education  - DVT ppx    - passed bedside dysphagia, started on diet  -ASA/ statin  -Possible loop recorder placement.     Problem/Plan - 2:  ·  Problem: Pneumonia.  Plan: Overnight T 101F rectally. CXR with worsening infiltrate  - Started on vanc/zosyn daily through   - BCx, UCx NGTD, RVP negative.     Problem/Plan - 3:  ·  Problem: Hypercalcemia.  Plan: recent hospitalization for hypercalcemia in setting of malignancy. 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 Ca normal this admission.   Pt family, pt may have outpt follow up with Dr Roper and IV fluid if necessary to address hypercalcemia.   -recent labs show low PTH, low PTHrP  -ionized calcium elevated (1.52)  -no changes on telemetry/EKG  -monitor electrolytes, IVF hydration   -f/u with Dr. Valdez rec's     #Hyponatremia: f/u urine lytes and serum osmole     # Anemia: Hb 6.6 today; will get type and screen and transfuse 1u PRBC. FOBT negative  c/w 40 mg IV protonix PO daily.    Problem/Plan - 4:  ·  Problem: Lung mass.  Plan: lytic lesion on 6th rib not suggestive of breast CA mets, rather new malignancy.   -Per palliative, hospice care discussed as an extra layer of support if pt declines despite the supportive care offered by Dr Roper.  Family appears open to supportive care wit a plan to transition to home hospice care when/if pt declines further.

## 2017-12-12 NOTE — PROGRESS NOTE ADULT - PROBLEM SELECTOR PLAN 4
lytic lesion on 6th rib not suggestive of breast CA mets, rather new malignancy.   -Per palliative, hospice care discussed as an extra layer of support if pt declines despite the supportive care offered by Dr Roper.  Family appears open to supportive care wit a plan to transition to home hospice care when/if pt declines further. lytic lesion on 6th rib not suggestive of breast CA mets, rather new malignancy.   -Per palliative, hospice care discussed as an extra layer of support if pt declines despite the supportive care offered by Dr Roper.  Family appears open to supportive care with a plan to transition to home hospice care when/if pt declines further.

## 2017-12-12 NOTE — PROGRESS NOTE ADULT - ASSESSMENT
89F PMH breast cancer, AAA repair, GERD, HTN, anxiety, recent admission for sepsis 2/2 aspiration pneumonia, found to have hypercalcemia and lytic bone lesion w/ associated suspicious right lung mass(s/p bx 11/29) who presents w/ slurred speech noted this morning. 89F PMH breast cancer, AAA repair, GERD, HTN, anxiety, recent admission for sepsis 2/2 aspiration pneumonia, found to have hypercalcemia and lytic bone lesion w/ associated suspicious right lung mass(s/p bx 11/29) who presents w/ slurred speech noted this morning secondary to embolic infarcts.

## 2017-12-12 NOTE — PROGRESS NOTE ADULT - SUBJECTIVE AND OBJECTIVE BOX
· Subjective and Objective: 	  HOSPITAL COURSE:  89F PMH breast cancer, AAA repair, GERD, HTN, anxiety, recent admission for sepsis 2/2 aspiration pneumonia, found to have hypercalcemia and lytic bone lesion w/ associated suspicious right lung mass(s/p bx 11/29) who presents w/ slurred speech. Pt outside the window for tPA.  Symptoms resolved on presentation NIHSS 0. 10-system ROS negative except for left rib pain  In the ED VS(T:99.1F HR:91 BP:117/72 RR:18 O2%:94 on RA ). Labs significant for WBC 14.8, H/H 8.2/25, Na 128, Ca 11.8(qing), UA w/ WBC, LE, bacteria. CTH revealed bony mets and lacunar infarcts, MRI revealed bony mets and multiple acute/subacute infarcts consistent with embolic phenomenon v. hypercoagulable state.  MRA negative.  Admitted to  for acute stroke.     ROS  CV: Denies chest pain, palpitations  RESP: Denies SOB  GI: Denies abdominal pain, constipation, diarrhea, nausea, vomiting  : Denies dysuria, hematuria, flank or back pain  ID: Denies fevers, chills  MSK: Denies joint pain   DERM: Denies any rashes, bruising, pruritis  NEURO: No headaches, blurry vision, double vision  ENDO: Denies heat or cold intolerances, changes in weight, thinning of hair  PSYCH: Denies any mood changes    VITAL SIGNS:  Vital Signs Last 24 Hrs  T(C): 36.7 (12 Dec 2017 17:18), Max: 36.7 (12 Dec 2017 01:51)  T(F): 98 (12 Dec 2017 17:18), Max: 98.1 (12 Dec 2017 01:51)  HR: 66 (12 Dec 2017 08:18) (66 - 100)  BP: 145/68 (12 Dec 2017 08:18) (145/68 - 169/77)  BP(mean): 98 (12 Dec 2017 08:18) (98 - 110)  RR: 18 (12 Dec 2017 08:18) (16 - 18)  SpO2: 94% (12 Dec 2017 08:18) (94% - 98%)    PHYSICAL EXAM:  Constitutional: WDWN, NAD, resting comfortably   Head: NC/AT  Eyes: PERRL, EOMI, anicteric sclera, no mucosal pallor  ENT: no nasal discharge; uvula midline, no oropharyngeal erythema or exudates; MMM  Neck: supple; no JVD or thyromegaly, no lymphadenopathy  Respiratory: CTA B/L; no W/R/R, no retractions  Cardiac: +S1/S2; RRR; no M/R/G; PMI non-displaced  Gastrointestinal: abdomen soft, NT/ND; no rebound or guarding; +BSx4  Back: spine midline, no bony tenderness or step-offs; no CVAT B/L  Extremities: warm; no calf tenderness, no pedal edema, no cyanosis, no clubbing  Musculoskeletal: NROM x4; no joint swelling, tenderness or erythema  Vascular: 2+ radial, femoral; DP/PT pulses B/L  Dermatologic: no rash, no petechia   Lymphatic: no submandibular or cervical LAD  Neurologic: AAOx3; CNII-XII grossly intact; 5/5 strength throughout, sensory symmetrically intact in B/L LE   Psychiatric: pleasant and conversive; affect and characteristics of appearance, verbalizations, behaviors are appropriate    MEDICATIONS  (STANDING):  aspirin  chewable 81 milliGRAM(s) Oral daily  atorvastatin 80 milliGRAM(s) Oral at bedtime  cyanocobalamin 500 MICROGram(s) Oral daily  levothyroxine 75 MICROGram(s) Oral daily  metoprolol     tartrate 50 milliGRAM(s) Oral two times a day  pantoprazole  Injectable 40 milliGRAM(s) IV Push daily    Allergies  No Known Allergies    LABS:                        6.6    9.8   )-----------( 268      ( 12 Dec 2017 06:50 )             20.7   12-12    134<L>  |  103  |  17  ----------------------------<  91  4.2   |  20<L>  |  1.01    Ca    9.2      12 Dec 2017 06:50  Phos  2.5     12-12  Mg     1.8     12-12       RADIOLOGY & ADDITIONAL TESTS:  Culture - Blood (collected 12-08-17 @ 15:14)  Source: .Blood Blood-Peripheral  Preliminary Report (12-09-17 @ 04:01):  No growth at 12 hours    Culture - Blood (collected 12-08-17 @ 15:13)  Source: .Blood Blood-Peripheral  Preliminary Report (12-09-17 @ 04:01):  No growth at 12 hours      Assessment and Plan:   · Assessment		  89F PMH breast cancer, AAA repair, GERD, HTN, anxiety, recent admission for sepsis 2/2 aspiration pneumonia, found to have hypercalcemia and lytic bone lesion w/ associated suspicious right lung mass(s/p bx 11/29) who presents w/ slurred speech noted this morning.       Problem/Plan - 1:  ·  Problem: CVA (cerebral vascular accident).  Plan: Slurred speech and lower extremity weakness this AM; last known normal last night (12/08/17) @ 7pm. 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 Patient only on simvastatin 10mg at home. Symptoms resolved while in ED. MRA H/N without occlusion   - NIHSS 0  - s/p  and Lipitor 80mg in ED. Will c/w ASA 81mg daily & Lipitor 80mg qhs  - echocardiogram w/ bubble showed intraatrial shunt. Will consult cards for possible closure of PFO Vs ASD  -LILY for further workup of cardioembolic etiology  -LE duplex for DVT rule out  - PT/OT  - stroke education  - DVT ppx   - telemetry monitoring   - passed bedside dysphagia, started on diet  -ASA/ statin.     Problem/Plan - 2:  ·  Problem: Hypercalcemia.  Plan: recent hospitalization for hypercalcemia in setting of malignancy. 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 Ca normal this admission.    -recent labs show low PTH, low PTHrP  -ionized calcium  -no changes on telemetry/EKG  -monitor electrolytes, IVF hydration     # Anemia: Tx PRBCs to keep Hb > 7 g/dl  40 mg IV protonix started.      Problem/Plan - 3:  ·  Problem: Lung mass.  Plan: as per above  -Will consult palliative for goals of care.     Problem/Plan - 4:  ·  Problem: Fever.  Plan: discharged on Nov 17th w/ diagnosis of CAP s/p treatment  presents to ED w/ fever 100.4, 2/4SIRS without lactate. No clear source of infection. PNA possible however CXR with improvement of effusions and infiltrate since last admission. UA not clean catch. s/p Vancomycin and Zosyn in ED  - will hold off on abx   - repeat CXR in AM  - Leukocytosis resolved on CBC  - f/u blood and urine cultures.      Problem/Plan - 5:  ·  Problem: Squamous cell carcinoma.  Plan: lytic lesion on 6th rib not suggestive of breast CA mets, rather new malignancy.   - will further treatment options with patient and family     Problem/Plan - 6:  Problem: AAA (abdominal aortic aneurysm). Plan: BP control  stable.     Problem/Plan - 7:  ·  Problem: HTN (hypertension).  Plan: c/w home metoprolol 50mg BID.      Problem/Plan - 8:  ·  Problem: GERD (gastroesophageal reflux disease).  Plan: famotidine 20mg daily.      Problem/Plan - 9:  ·  Problem: Need for prophylactic measure.  Plan: HSQ, SCD's.      Problem/Plan - 10:  Problem: Nutrition, metabolism, and development symptoms. Plan; NPO until passed bedside dysphagia   monitor/ replete electrolytes K>4, Mg>2   IVF @ 75cc/hr     DNR/DNI

## 2017-12-12 NOTE — PROGRESS NOTE ADULT - PROBLEM SELECTOR PLAN 3
recent hospitalization for hypercalcemia in setting of malignancy. 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 Ca normal this admission.   Pt family, pt may have outpt follow up with Dr Roper and IV fluid if necessary to address hypercalcemia.   -recent labs show low PTH, low PTHrP  -ionized calcium elevated (1.52)  -no changes on telemetry/EKG  -monitor electrolytes, IVF hydration   -f/u with Dr. Valdez rec's     #Hyponatremia: f/u urine lytes and serum osmole     # Anemia: Hb ~7; stable on repeat, f/u FOBT.  c/w 40 mg IV protonix PO daily recent hospitalization for hypercalcemia in setting of malignancy. 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 Ca normal this admission.   Pt family, pt may have outpt follow up with Dr Roper and IV fluid if necessary to address hypercalcemia.   -recent labs show low PTH, low PTHrP  -ionized calcium elevated (1.52)  -no changes on telemetry/EKG  -monitor electrolytes, IVF hydration   -f/u with Dr. Valdez rec's     #Hyponatremia: f/u urine lytes and serum osmole     # Anemia: Hb ~7; stable on repeat, FOBT negative  c/w 40 mg IV protonix PO daily recent hospitalization for hypercalcemia in setting of malignancy. 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 Ca normal this admission.   Pt family, pt may have outpt follow up with Dr Roper and IV fluid if necessary to address hypercalcemia.   -recent labs show low PTH, low PTHrP  -ionized calcium elevated (1.52)  -no changes on telemetry/EKG  -monitor electrolytes, IVF hydration   -f/u with Dr. Valdez rec's     #Hyponatremia: f/u urine lytes and serum osmole     # Anemia: Hb 6.6 today; will get type and screen and transfuse 1u PRBC. FOBT negative  c/w 40 mg IV protonix PO daily recent hospitalization for hypercalcemia in setting of malignancy. 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 Ca normal this admission.   Pt family, pt may have outpt follow up with Dr Roper and IV fluid if necessary to address hypercalcemia.   -recent labs show low PTH, low PTHrP  -ionized calcium elevated (1.52)  -no changes on telemetry/EKG  -monitor electrolytes, IVF hydration   -f/u with Dr. Valdez rec's       # Anemia: Hb 6.6 today; appears to be iron deficiency anemia based on iron studies from prior labs. Will get type and screen and transfuse 1u PRBC. FOBT negative  c/w 40 mg IV protonix PO daily

## 2017-12-12 NOTE — PROGRESS NOTE ADULT - ATTENDING COMMENTS
Patient seen and examined with two daughters at bedside.  Agree with above with following adjustments -  She feels fine.  Her Hgb decreased to 6.6.  No active bleeding noticed but still given 1 unit pRBC.  Also sending iron levels since her previous level was low (teens).  This may explain some of her general weakness.  Tolerating PO.  Blood pressure generally controlled.    The Strokes appear embolic but no source at this time.  Unclear if hypercoagulable due to cancer or if possibly atrial fibrillation.    Secondary stroke prevention -   - Continue Aspirin 81mg for antiplatelet since hasn't been on antiplatelet before.  - Continue Atorvastatin 80mg.  Please send lipid profile.    Stroke workup -   - Her daughters refused loop recorder to rule out atrial fibrillation as cause of emboli.  They were agreeable with Cardionet sent to her house for relative long term external monitoring of her rhythm.  Explained how the testing could change her medications.  Still no decision whether she would actually be candidate for anticoagulation.  - Seen by PT - dispo home with 24/7 care that she already has.    Course complicated by fever with infiltrates on antibiotics and anemia.  - Need to define number of days and which antibiotic she needs for treatment of infiltrates.  - Will consider giving IV iron if her iron level is low.    Palliative Care on board.  Patient DNR/DNI.  Daughters have MOLST form so can fill it out.  Family wants to continue treatment with Oncology with option for Hospice if exam worsens.

## 2017-12-12 NOTE — CHART NOTE - NSCHARTNOTEFT_GEN_A_CORE
Suspected Blood Transfusion Reaction Note    Situation: At 8pm, patient developed new hypertensive urgency to sBP 192 with complaint of shortness of breath. She had not been this hypertensive in the past. Today, she received 1 unit pRBC (only transfusion this admission) which completed at 4:30pm. Initially, thought that elevated BP was due to pain, however at 9pm, her BP increased to 222/111. She was also tachycardic to 113 (which is new), bedside EKG showed sinus tachycardia with , first degree AV block, unchanged anterior Q waves. She became more tachypneic to ~25 breaths/minute, felt short of breath, and was placed on 4L NC oxygen by RN due to decrease in O2 sat to 80s, however with poor waveform. Subsequently, axillary temperature found to be 100.4F. On focused physical exam, mentating well, tachycardic but regular rhythm, lung exam with bilateral base crackles and decreased breath sound bilaterally, abdominal exam benign, legs with no peripheral edema or asymmetry.    Background: 89 y/o F with pmhx of breast cancer, AAA repair, aspiration pneumonia, anemia, hypercalcemia with bone lesions, originally presented for stroke workup, currently treating for HAP with Vanc/Zosyn.    Assessment: Clinical picture suspicious for blood transfusion reaction, specifically TACO (transfusion associated circulatory overload) due to acute hypertension and shortness of breath. Low grade fever most likely related to blood transfusion reaction, hold off on further blood cultures for now unless has repeat fever > 101F. Differential also includes PE, though recently checked for DVT which was negative. Wells PE score of 2.5 (moderate risk) due to cancer and tachycardia > 100.    Response:  -given Lasix 10mg IV and labetalol 10mg IV with improvement in BP to 143/71, seen making urine (incontinent) on bed  -given Tylenol 650mg x 1 for low grade fever  -ordered urgent chest X-ray, performed after given Lasix, appeared unchanged from 12/10, will f/up official read  -EKG performed, findings as above - sinus tachycardia with first degree AV block and unchanged anterior Q waves  -STAT labs performed, checked CBC, BMP, Pro-BNP, lactate, PT/PTT/INR, cardiac enzymes, Transfusion reaction workup, and Type&Screen  -contacted blood bank, will fill out blood transfusion report form as instructed and send down with requested pink tube labs  -holding off on blood cultures at this time unless spikes repeat fever > 101F  -holding off on workup for PE at this time given clinical picture more likely blood transfusion related, discussed with attending Dr. Avila    Please contact Dr. Cui, PGY-2 () with any questions. Suspected Blood Transfusion Reaction Note    Situation: At 8pm, patient developed new hypertensive urgency to sBP 192 with complaint of shortness of breath. She had not been this hypertensive in the past. Today, she received 1 unit pRBC (only transfusion this admission) which completed at 4:30pm. Initially, thought that elevated BP was due to pain, however at 9pm, her BP increased to 222/111. She was also tachycardic to 113 (which is new), bedside EKG showed sinus tachycardia with , first degree AV block, unchanged anterior Q waves. She became more tachypneic to ~25 breaths/minute, felt short of breath, and was placed on 4L NC oxygen by RN due to decrease in O2 sat to 80s, however with poor waveform. Subsequently, axillary temperature found to be 100.4F. On focused physical exam, mentating well, tachycardic but regular rhythm, lung exam with bilateral base crackles and decreased breath sound bilaterally, abdominal exam benign, legs with no peripheral edema or asymmetry.    Background: 89 y/o F with pmhx of breast cancer, AAA repair, aspiration pneumonia, anemia, hypercalcemia with bone lesions, originally presented for stroke workup, currently treating for HAP with Vanc/Zosyn.    Assessment: Clinical picture suspicious for blood transfusion reaction, specifically TACO (transfusion associated circulatory overload) due to acute hypertension and shortness of breath. Low grade fever most likely related to blood transfusion reaction, hold off on further blood cultures for now unless has repeat fever > 101F. Differential also includes acute hemolytic transfusion reaction (AHTR), a septic transfusion reaction, transfusion-related acute lung injury (TRALI) - but these 3 are less likely given hypertension instead of hypotension. Possibly may have had febrile nonhemolytic transfusion reaction (FNHTR), however this is confounded by the fact that the patient has been having episodic low grade temperatures attributed to possible HAP, before receiving blood transfusion.    Differential also includes PE, though recently checked for DVT which was negative. Wells PE score of 2.5 (moderate risk) due to cancer and tachycardia > 100.    Response:  -given Lasix 10mg IV and labetalol 10mg IV with improvement in BP to 143/71, seen making urine (incontinent) on bed  -given Tylenol 650mg x 1 for low grade fever  -ordered urgent chest X-ray, performed after given Lasix, appeared unchanged from 12/10, will f/up official read  -EKG performed, findings as above - sinus tachycardia with first degree AV block and unchanged anterior Q waves  -STAT labs performed, checked CBC, BMP, Pro-BNP, lactate, PT/PTT/INR, cardiac enzymes, Transfusion reaction workup, and Type&Screen  -contacted blood bank, will fill out blood transfusion report form as instructed and send down with requested pink tube labs  -holding off on blood cultures at this time unless spikes repeat fever > 101F  -holding off on workup for PE at this time given clinical picture more likely blood transfusion related, discussed with attending Dr. Avila    Please contact Dr. Cui, PGY-2 () with any questions. Suspected Blood Transfusion Reaction Note    Situation: At 8pm, patient developed new hypertensive urgency to sBP 192 with complaint of shortness of breath. She had not been this hypertensive in the past. Today, she received 1 unit pRBC (only transfusion this admission) which completed at 4:30pm. Initially, thought that elevated BP was due to pain, however at 9pm, her BP increased to 222/111. She was also tachycardic to 113 (which is new), bedside EKG showed sinus tachycardia with , first degree AV block, unchanged anterior Q waves. She became more tachypneic to ~25 breaths/minute, felt short of breath, and was placed on 4L NC oxygen by RN due to decrease in O2 sat to 80s, however with poor waveform. Subsequently, axillary temperature found to be 100.4F. On focused physical exam, mentating well, tachycardic but regular rhythm, lung exam with bilateral base crackles and decreased breath sound bilaterally, abdominal exam benign, legs with no peripheral edema or asymmetry.    Background: 89 y/o F with pmhx of breast cancer, AAA repair, aspiration pneumonia, anemia, hypercalcemia with bone lesions, originally presented for stroke workup, currently treating for HAP with Vanc/Zosyn.    Assessment: Clinical picture suspicious for blood transfusion reaction, specifically TACO (transfusion associated circulatory overload) due to acute hypertension and shortness of breath. Low grade fever most likely related to blood transfusion reaction, hold off on further blood cultures for now unless has repeat fever > 101F. Differential also includes acute hemolytic transfusion reaction (AHTR), a septic transfusion reaction, transfusion-related acute lung injury (TRALI) - but these 3 are less likely given hypertension instead of hypotension. Possibly may have had febrile nonhemolytic transfusion reaction (FNHTR), however this is confounded by the fact that the patient has been having episodic low grade temperatures attributed to possible HAP, before receiving blood transfusion.    Differential also includes PE, though recently checked for DVT which was negative. Wells PE score of 2.5 (moderate risk) due to cancer and tachycardia > 100.    Response:  -given Lasix 10mg IV and labetalol 10mg IV with improvement in BP to 143/71, seen making urine (incontinent) on bed  -given Tylenol 650mg x 1 for low grade fever  -ordered urgent chest X-ray, performed after given Lasix, appeared unchanged from 12/10, will f/up official read  -EKG performed, findings as above - sinus tachycardia with first degree AV block and unchanged anterior Q waves  -lab draws performed, checked CBC, BMP, Pro-BNP elevated to 12k supporting TACO, lactate (negative, 1.3), PT/PTT/INR, cardiac enzymes, Transfusion reaction workup, and Type & Screen  -contacted blood bank, will fill out blood transfusion report form as instructed and send down with requested pink tube labs  -holding off on blood cultures at this time unless spikes repeat fever > 101F  -holding off on workup for PE at this time given clinical picture more likely blood transfusion related, discussed with attending Dr. Avila    Please contact Dr. Cui, PGY-2 () with any questions. Suspected Blood Transfusion Reaction Note    Situation: At 8pm, patient developed new hypertensive urgency to sBP 192 with complaint of shortness of breath. She had not been this hypertensive in the past. Today, she received 1 unit pRBC (only transfusion this admission) which completed at 4:30pm. Initially, thought that elevated BP was due to pain, however at 9pm, her BP increased to 222/111. She was also tachycardic to 113 (which is new), bedside EKG showed sinus tachycardia with , first degree AV block, unchanged anterior Q waves. She became more tachypneic to ~25 breaths/minute, felt short of breath, and was placed on 4L NC oxygen by RN due to decrease in O2 sat to 80s, however with poor waveform. Subsequently, axillary temperature found to be 100.9F. On focused physical exam, mentating well, tachycardic but regular rhythm, lung exam with bilateral base crackles and decreased breath sound bilaterally, abdominal exam benign, legs with no peripheral edema or asymmetry.    Background: 89 y/o F with pmhx of breast cancer, AAA repair, aspiration pneumonia, anemia, hypercalcemia with bone lesions, originally presented for stroke workup, currently treating for HAP with Vanc/Zosyn.    Assessment: Clinical picture suspicious for blood transfusion reaction, specifically TACO (transfusion associated circulatory overload) due to acute hypertension and shortness of breath. Low grade fever most likely related to blood transfusion reaction, however will draw repeat blood cultures in setting of sepsis treatment. Differential also includes acute hemolytic transfusion reaction (AHTR), a septic transfusion reaction, transfusion-related acute lung injury (TRALI) - but these 3 are less likely given hypertension instead of hypotension. Possibly may have had febrile nonhemolytic transfusion reaction (FNHTR), however this is confounded by the fact that the patient has been having episodic low grade temperatures attributed to possible HAP, before receiving blood transfusion.    Differential also includes PE, though recently checked for DVT which was negative. Wells PE score of 2.5 (moderate risk) due to cancer and tachycardia > 100.    Response:  -given Lasix 10mg IV and labetalol 10mg IV with improvement in BP to 143/71, seen making urine (incontinent) on bed  -given Tylenol 650mg x 1 for low grade fever  -ordered urgent chest X-ray, performed after given Lasix, appeared grossly unchanged from 12/10, will f/up official read  -EKG performed, findings as above - sinus tachycardia with first degree AV block and unchanged anterior Q waves  -lab draws performed, checked CBC, BMP, Pro-BNP elevated to 12k supporting TACO, lactate (negative, 1.3), PT/PTT/INR, cardiac enzymes, Transfusion reaction workup, and Type & Screen  -contacted blood bank, will fill out blood transfusion report form as instructed and send down with requested pink tube labs  -draw 2 sets blood cultures  -holding off on workup for PE at this time given clinical picture more likely blood transfusion related, discussed with attending Dr. Avila    Please contact Dr. Cui, PGY-2 () with any questions. Suspected Blood Transfusion Reaction Note    Situation: At 8:11pm, patient developed new hypertensive urgency to 196/93 with complaint of shortness of breath. She had not been this hypertensive in the past, previous systolic blood pressures ranged 130-150. Today, she received 1 unit pRBC (only transfusion this admission) which started at 4:30pm and ended about 8pm, followed by HTN urgency 11 minutes later. Initially, thought that elevated BP was due to pain, however at 9pm, her BP increased to 222/111. She was also tachycardic to 113 (which is new), bedside EKG showed sinus tachycardia with , first degree AV block, unchanged anterior Q waves. She became more tachypneic to ~25 breaths/minute, felt short of breath, and was placed on 4L NC oxygen by RN due to decrease in O2 sat to 80s, however with poor waveform. Subsequently, axillary temperature found to be 100.9F. On focused physical exam, mentating well, tachycardic but regular rhythm, lung exam with bilateral base crackles and decreased breath sound bilaterally, abdominal exam benign, legs with no peripheral edema or asymmetry.    Background: 89 y/o F with pmhx of breast cancer, AAA repair, aspiration pneumonia, anemia, hypercalcemia with bone lesions, originally presented for stroke workup, currently treating for HAP with Vanc/Zosyn.    Assessment: Clinical picture suspicious for blood transfusion reaction, specifically TACO (transfusion associated circulatory overload) due to acute hypertension and shortness of breath. Low grade fever most likely related to blood transfusion reaction, however will draw repeat blood cultures in setting of sepsis treatment. Differential also includes acute hemolytic transfusion reaction (AHTR), a septic transfusion reaction, transfusion-related acute lung injury (TRALI) - but these 3 are less likely given hypertension instead of hypotension. Possibly may have had febrile nonhemolytic transfusion reaction (FNHTR), however this is confounded by the fact that the patient has been having episodic low grade temperatures attributed to possible HAP, before receiving blood transfusion.    Differential also includes PE, though recently checked for DVT which was negative. Wells PE score of 2.5 (moderate risk) due to cancer and tachycardia > 100.    Response:  -given Lasix 10mg IV and labetalol 10mg IV with improvement in BP to 143/71, seen making urine (incontinent) on bed  -given Tylenol 650mg x 1 for low grade fever  -ordered urgent chest X-ray, performed after given Lasix, appeared grossly unchanged from 12/10, will f/up official read  -EKG performed, findings as above - sinus tachycardia with first degree AV block and unchanged anterior Q waves  -lab draws performed, checked CBC, BMP, Pro-BNP elevated to 12k supporting TACO, lactate (negative, 1.3), PT/PTT/INR, cardiac enzymes, Transfusion reaction workup, and Type & Screen  -contacted blood bank, will fill out blood transfusion report form as instructed and send down with Type and Screen, transfusion reaction workup. Will also send post-transfusion urine specimen labeled "transfusion reaction" and add on LDH, bilirubin, haptoglobin to prior labs.  -draw 2 sets blood cultures  -holding off on workup for PE at this time given clinical picture more likely blood transfusion related, discussed with attending Dr. Avila    Please contact Dr. Cui, PGY-2 () with any questions. Suspected Blood Transfusion Reaction Note    Situation: At 8:11pm, patient developed new hypertensive urgency to 196/93 with complaint of shortness of breath. She had not been this hypertensive in the past, previous systolic blood pressures ranged 130-150. Today, she received 1 unit pRBC (only transfusion this admission) which started at 4:30pm and ended about 8pm, followed by HTN urgency 11 minutes later. Initially, thought that elevated BP was due to pain, however at 9pm, her BP increased to 222/111. She was also tachycardic to 113 (which is new), bedside EKG showed sinus tachycardia with , first degree AV block, unchanged anterior Q waves. She became more tachypneic to ~25 breaths/minute, felt short of breath, and was placed on 4L NC oxygen by RN due to decrease in O2 sat to 80s, however with poor waveform. Subsequently, axillary temperature found to be 100.9F. On focused physical exam, mentating well, tachycardic but regular rhythm, lung exam with bilateral base crackles and decreased breath sound bilaterally, abdominal exam benign, legs with no peripheral edema or asymmetry.    Background: 89 y/o F with pmhx of breast cancer, AAA repair, aspiration pneumonia, anemia, hypercalcemia with bone lesions, originally presented for stroke workup, currently treating for HAP with Vanc/Zosyn.    Assessment: Clinical picture suspicious for blood transfusion reaction, specifically TACO (transfusion associated circulatory overload) due to acute hypertension and shortness of breath. Low grade fever most likely related to blood transfusion reaction, however will draw repeat blood cultures in setting of sepsis treatment. Differential also includes acute hemolytic transfusion reaction (AHTR), a septic transfusion reaction, transfusion-related acute lung injury (TRALI) - but these 3 are less likely given hypertension instead of hypotension. Possibly may have had febrile nonhemolytic transfusion reaction (FNHTR), however this is confounded by the fact that the patient has been having episodic low grade temperatures attributed to possible HAP, before receiving blood transfusion.    Differential also includes PE, though recently checked for DVT which was negative. Wells PE score of 2.5 (moderate risk) due to cancer and tachycardia > 100.    Response:  -given Lasix 10mg IV and labetalol 10mg IV with improvement in BP to 143/71, seen making urine (incontinent) on bed  -given Tylenol 650mg x 1 for low grade fever  -ordered urgent chest X-ray, performed after given Lasix, appeared grossly unchanged from 12/10, will f/up official read  -EKG performed, findings as above - sinus tachycardia with first degree AV block and unchanged anterior Q waves  -lab draws performed, checked CBC, BMP, Pro-BNP elevated to 12k supporting TACO, lactate (negative, 1.3), PT/PTT/INR, cardiac enzymes, Transfusion reaction workup, and Type & Screen  -contacted blood bank, will fill out blood transfusion report form as instructed and send down with Type and Screen, transfusion reaction workup. Will also send post-transfusion urine specimen labeled "transfusion reaction" and add on LDH and bilirubin to prior labs.  -sent 2 sets blood cultures  -holding off on workup for PE at this time given clinical picture more likely blood transfusion related, discussed with attending Dr. Avila    Please contact Dr. Cui, PGY-2 () with any questions. Suspected Blood Transfusion Reaction Note    Situation: At 8:11pm, patient developed new hypertensive urgency to 196/93 with complaint of shortness of breath. She had not been this hypertensive in the past, previous systolic blood pressures ranged 130-150. Today, she received 1 unit pRBC (only transfusion this admission) which started at 4:30pm and ended about 8pm, followed by HTN urgency 11 minutes later. Initially, thought that elevated BP was due to pain, however at 9pm, her BP increased to 222/111. She was also tachycardic to 113 (which is new), bedside EKG showed sinus tachycardia with , first degree AV block, unchanged anterior Q waves. She became more tachypneic to ~25 breaths/minute, felt short of breath, and was placed on 4L NC oxygen by RN due to decrease in O2 sat to 80s, however with poor waveform. Subsequently, axillary temperature found to be 100.9F. On focused physical exam, mentating well, tachycardic but regular rhythm, lung exam with bilateral base crackles and decreased breath sound bilaterally, abdominal exam benign, legs with no peripheral edema or asymmetry.    Background: 89 y/o F with pmhx of breast cancer, AAA repair, aspiration pneumonia, anemia, hypercalcemia with bone lesions, originally presented for stroke workup, currently treating for HAP with Vanc/Zosyn.    Assessment: Clinical picture suspicious for blood transfusion reaction, specifically TACO (transfusion associated circulatory overload) due to acute hypertension and shortness of breath. Low grade fever most likely related to blood transfusion reaction, however will draw repeat blood cultures in setting of sepsis treatment. Differential also includes acute hemolytic transfusion reaction (AHTR), a septic transfusion reaction, transfusion-related acute lung injury (TRALI) - but these 3 are less likely given hypertension instead of hypotension. Possibly may have had febrile nonhemolytic transfusion reaction (FNHTR), however this is confounded by the fact that the patient has been having episodic low grade temperatures attributed to possible HAP, before receiving blood transfusion.    Differential also includes PE, though recently checked for DVT which was negative. Wells PE score of 2.5 (moderate risk) due to cancer and tachycardia > 100.    Response:  -given Lasix 10mg IV and labetalol 10mg IV with improvement in BP to 143/71, seen making urine (incontinent) on bed  -given Tylenol 650mg x 1 for low grade fever  -ordered urgent chest X-ray, performed after given Lasix, appeared grossly unchanged from 12/10, will f/up official read  -EKG performed, findings as above - sinus tachycardia with first degree AV block and unchanged anterior Q waves  -lab draws performed, checked CBC (appropriate rise from Hb 6.6 to 8.3, with new leukocytosis), BMP (grossly unchanged), Pro-BNP elevated to 12k supporting TACO, lactate (negative -  1.3), PT/PTT/INR, cardiac enzymes (troponin 0.02 likely 2/2 demand ischemia, will trend to peak), Transfusion reaction workup, and Type & Screen  -contacted blood bank, will fill out blood transfusion report form as instructed and send down with Type and Screen, transfusion reaction workup. Will also send post-transfusion urine specimen labeled "transfusion reaction" and add on LDH and bilirubin to prior labs.  -sent 2 sets blood cultures  -holding off on workup for PE at this time given clinical picture more likely blood transfusion related, discussed with attending Dr. Avila    Please contact Dr. Cui, PGY-2 () with any questions. Suspected Blood Transfusion Reaction Note    Situation: At 8:11pm, patient developed new hypertensive urgency to 196/93 with complaint of shortness of breath. She had not been this hypertensive in the past, previous systolic blood pressures ranged 130-150. Today, she received 1 unit pRBC (only transfusion this admission) which started at 4:30pm and ended about 8pm, followed by HTN urgency 11 minutes later. Initially, thought that elevated BP was due to pain, however at 9pm, her BP increased to 222/111. She was also tachycardic to 113 (which is new), bedside EKG showed sinus tachycardia with , first degree AV block, unchanged anterior Q waves. She became more tachypneic to ~25 breaths/minute, felt short of breath, and was placed on 4L NC oxygen by RN due to decrease in O2 sat to 80s, however with poor waveform. Subsequently, axillary temperature found to be 100.9F. On focused physical exam, mentating well, tachycardic but regular rhythm, lung exam with bilateral base crackles and decreased breath sound bilaterally, abdominal exam benign, legs with no peripheral edema or asymmetry.    Background: 89 y/o F with pmhx of breast cancer, AAA repair, aspiration pneumonia, anemia, hypercalcemia with bone lesions, originally presented for stroke workup, currently treating for HAP with Vanc/Zosyn.    Assessment: Clinical picture suspicious for blood transfusion reaction, specifically TACO (transfusion associated circulatory overload) due to acute hypertension and shortness of breath. Low grade fever most likely related to blood transfusion reaction, however will draw repeat blood cultures in setting of sepsis treatment. Differential also includes acute hemolytic transfusion reaction (AHTR), a septic transfusion reaction, transfusion-related acute lung injury (TRALI) - but these 3 are less likely given hypertension instead of hypotension. Possibly may have had febrile nonhemolytic transfusion reaction (FNHTR), however this is confounded by the fact that the patient has been having episodic low grade temperatures attributed to possible HAP, before receiving blood transfusion.    Differential also includes PE, though recently checked for DVT which was negative. Wells PE score of 2.5 (moderate risk) due to cancer and tachycardia > 100.    Response:  -given Lasix 10mg IV and labetalol 10mg IV with improvement in BP to 143/71, seen making urine (incontinent) on bed  -given Tylenol 650mg x 1 for low grade fever  -ordered urgent chest X-ray, performed after given Lasix, appeared grossly unchanged from 12/10, will f/up official read  -EKG performed, findings as above - sinus tachycardia with first degree AV block and unchanged anterior Q waves  -lab draws performed, checked CBC (appropriate rise from Hb 6.6 to 8.3, with new leukocytosis), BMP (grossly unchanged, add on LFTs), Pro-BNP elevated to 12k supporting TACO, lactate (negative -  1.3), PT/PTT/INR, cardiac enzymes (troponin 0.02 likely 2/2 demand ischemia, will trend to peak), Transfusion reaction workup, and Type & Screen  -contacted blood bank, will fill out blood transfusion report form as instructed and send down with Type and Screen, transfusion reaction workup. Will also send post-transfusion urine specimen labeled "transfusion reaction" and add on LDH and bilirubin to prior labs.  -sent 2 sets blood cultures  -holding off on workup for PE at this time given clinical picture more likely blood transfusion related, discussed with attending Dr. Avila    Please contact Dr. Cui, PGY-2 () with any questions. Suspected Blood Transfusion Reaction Note    Situation: At 8:11pm, patient developed new hypertensive urgency to 196/93 with complaint of shortness of breath. She had not been this hypertensive in the past, previous systolic blood pressures ranged 130-150. Today, she received 1 unit pRBC (only transfusion this admission) which started at 4:30pm and ended about 8pm, followed by HTN urgency 11 minutes later. Initially, thought that elevated BP was due to pain, however at 9pm, her BP increased to 222/111. She was also tachycardic to 113 (which is new), bedside EKG showed sinus tachycardia with , first degree AV block, unchanged anterior Q waves. She became more tachypneic to ~25 breaths/minute, felt short of breath, and was placed on 4L NC oxygen by RN due to decrease in O2 sat to 80s, however with poor waveform. Subsequently, axillary temperature found to be 100.9F. On focused physical exam, mentating well, tachycardic but regular rhythm, lung exam with bilateral base crackles and decreased breath sound bilaterally, abdominal exam benign, legs with no peripheral edema or asymmetry.    Background: 89 y/o F with pmhx of breast cancer, AAA repair, aspiration pneumonia, anemia, hypercalcemia with bone lesions, originally presented for stroke workup, currently treating for HAP with Vanc/Zosyn.    Assessment: Clinical picture suspicious for blood transfusion reaction, specifically TACO (transfusion associated circulatory overload) due to acute hypertension and shortness of breath. Low grade fever most likely related to blood transfusion reaction, however will draw repeat blood cultures in setting of sepsis treatment. Differential also includes acute hemolytic transfusion reaction (AHTR), a septic transfusion reaction, transfusion-related acute lung injury (TRALI) - but these 3 are less likely given hypertension instead of hypotension. Possibly may have had febrile nonhemolytic transfusion reaction (FNHTR), however this is confounded by the fact that the patient has been having episodic low grade temperatures attributed to possible HAP, before receiving blood transfusion.    Differential also includes PE, though recently checked for DVT which was negative. Wells PE score of 2.5 (moderate risk) due to cancer and tachycardia > 100.    Response:  -given Lasix 10mg IV and labetalol 10mg IV with improvement in BP to 143/71, seen making urine (incontinent) on bed  -given Tylenol 650mg x 1 for low grade fever  -ordered urgent chest X-ray, performed after given Lasix, appeared grossly unchanged from 12/10, will f/up official read  -EKG performed, findings as above - sinus tachycardia with first degree AV block and unchanged anterior Q waves  -lab draws performed, checked CBC (appropriate rise from Hb 6.6 to 8.3, with new leukocytosis), BMP (grossly unchanged, add on LFTs), Pro-BNP elevated to 12k supporting TACO, lactate (negative -  1.3), PT/INR (mildly elevated but just above normal limits, PTT (normal), cardiac enzymes (troponin 0.02 likely 2/2 demand ischemia, will trend to peak), Transfusion reaction workup, and Type & Screen  -contacted blood bank, will fill out blood transfusion report form as instructed and send down with Type and Screen, transfusion reaction workup. Will also send post-transfusion urine specimen labeled "transfusion reaction" and add on LDH and bilirubin to prior labs.  -sent 2 sets blood cultures  -holding off on workup for PE at this time given clinical picture more likely blood transfusion related, discussed with attending Dr. Avila    Please contact Dr. Cui, PGY-2 () with any questions.

## 2017-12-12 NOTE — PROGRESS NOTE ADULT - SUBJECTIVE AND OBJECTIVE BOX
HOSPITAL COURSE:  89F PMH breast cancer, AAA repair, GERD, HTN, anxiety, recent admission for sepsis 2/2 aspiration pneumonia, found to have hypercalcemia and lytic bone lesion w/ associated suspicious right lung mass(s/p bx 11/29) who presents w/ slurred speech. Pt outside the window for tPA.  Symptoms resolved on presentation NIHSS 0. 10-system ROS negative except for left rib pain  In the ED VS(T:99.1F HR:91 BP:117/72 RR:18 O2%:94 on RA ). Labs significant for WBC 14.8, H/H 8.2/25, Na 128, Ca 11.8(qing), UA w/ WBC, LE, bacteria. 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  MRA negative.  Admitted to  for acute stroke. ECHO w/ bubble showed intraatrial shunt.  Pt had a fever on 12/11, chest X ray from 12/10 with progression left effusion in comparison to prior examination of the chest 12/8/2017. Fever workup started, Blood cxs NGTD. Pt started on Vanc/zosyn. She was seen by heme/onc with no additional recommendation at this time.    INTERVAL HPI/OVERNIGHT EVENTS:    ROS  CV: Denies chest pain, palpitations  RESP: Denies SOB  GI: Denies abdominal pain, constipation, diarrhea, nausea, vomiting  : Denies dysuria, hematuria, flank or back pain  ID: Denies fevers, chills  MSK: Denies joint pain   DERM: Denies any rashes, bruising, pruritis  NEURO: No headaches, blurry vision, double vision  ENDO: Denies heat or cold intolerances, changes in weight, thinning of hair  PSYCH: Denies any mood changes    VITAL SIGNS:  T(F): 98.1 (12-12-17 @ 01:51), Max: 98.2 (12-11-17 @ 13:46)  HR: 100 (12-11-17 @ 20:55) (74 - 108)  BP: 169/77 (12-11-17 @ 17:55) (132/60 - 173/81)  BP(mean): 110 (12-11-17 @ 17:55) (87 - 117)  RR: 17 (12-11-17 @ 20:55) (16 - 17)  SpO2: 96% (12-11-17 @ 20:55) (96% - 100%)      12-10-17 @ 07:01  -  12-11-17 @ 07:00  --------------------------------------------------------  IN: 600 mL / OUT: 0 mL / NET: 600 mL        PHYSICAL EXAM:    Constitutional: WDWN, NAD, resting comfortably   Head: NC/AT  Eyes: PERRL, EOMI, anicteric sclera, no mucosal pallor  ENT: no nasal discharge; uvula midline, no oropharyngeal erythema or exudates; MMM  Neck: supple; no JVD or thyromegaly, no lymphadenopathy  Respiratory: CTA B/L; no W/R/R, no retractions  Cardiac: +S1/S2; RRR; no M/R/G; PMI non-displaced  Gastrointestinal: abdomen soft, NT/ND; no rebound or guarding; +BSx4  Back: spine midline, no bony tenderness or step-offs; no CVAT B/L  Extremities: warm; no calf tenderness, no pedal edema, no cyanosis, no clubbing  Musculoskeletal: NROM x4; no joint swelling, tenderness or erythema  Vascular: 2+ radial, femoral; DP/PT pulses B/L  Dermatologic: B/L bruising of the shins  Lymphatic: no submandibular or cervical LAD  Neurologic: AAOx3; CNII-XII grossly intact; 5/5 strength throughout, sensory symmetrically intact in B/L LE   Psychiatric: pleasant and conversive; affect and characteristics of appearance, verbalizations, behaviors are appropriate      MEDICATIONS  (STANDING):  aspirin  chewable 81 milliGRAM(s) Oral daily  atorvastatin 80 milliGRAM(s) Oral at bedtime  cyanocobalamin 500 MICROGram(s) Oral daily  docusate sodium 100 milliGRAM(s) Oral two times a day  heparin  Injectable 5000 Unit(s) SubCutaneous every 12 hours  levothyroxine 75 MICROGram(s) Oral daily  lisinopril 2.5 milliGRAM(s) Oral daily  metoprolol     tartrate 50 milliGRAM(s) Oral two times a day  pantoprazole  Injectable 40 milliGRAM(s) IV Push daily  piperacillin/tazobactam IVPB. 3.375 Gram(s) IV Intermittent every 6 hours  polyethylene glycol 3350 17 Gram(s) Oral daily  sodium chloride 0.9%. 1000 milliLiter(s) (60 mL/Hr) IV Continuous <Continuous>    MEDICATIONS  (PRN):  acetaminophen   Tablet 650 milliGRAM(s) Oral every 6 hours PRN For Temp greater than 38 C (100.4 F)  acetaminophen   Tablet. 650 milliGRAM(s) Oral every 6 hours PRN Moderate Pain (4 - 6)  bisacodyl Suppository 10 milliGRAM(s) Rectal daily PRN Constipation      Allergies    No Known Allergies    Intolerances        LABS:                        7.2    10.2  )-----------( 265      ( 11 Dec 2017 11:17 )             23.3     12-11    134<L>  |  102  |  19  ----------------------------<  100<H>  3.8   |  21<L>  |  0.95    Ca    9.7      11 Dec 2017 06:43            RADIOLOGY & ADDITIONAL TESTS: HOSPITAL COURSE:  89F PMH breast cancer, AAA repair, GERD, HTN, anxiety, recent admission for sepsis 2/2 aspiration pneumonia, found to have hypercalcemia and lytic bone lesion w/ associated suspicious right lung mass(s/p bx 11/29) who presents w/ slurred speech. Pt outside the window for tPA.  Symptoms resolved on presentation NIHSS 0. 10-system ROS negative except for left rib pain  In the ED VS(T:99.1F HR:91 BP:117/72 RR:18 O2%:94 on RA ). Labs significant for WBC 14.8, H/H 8.2/25, Na 128, Ca 11.8(qing), UA w/ WBC, LE, bacteria. 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  MRA negative.  Admitted to  for acute stroke. ECHO w/ bubble showed intraatrial shunt.  Pt had a fever on 12/11, chest X ray from 12/10 with progression left effusion in comparison to prior examination of the chest 12/8/2017. Fever workup started, Blood cxs NGTD. Pt started on Vanc/zosyn. She was seen by heme/onc with no additional recommendation at this time. Pt received 1 uPRBC for Hb 6.6, (FOBT negative).     INTERVAL HPI/OVERNIGHT EVENTS: No acute events     ROS  CV: Denies chest pain, palpitations  RESP: Denies SOB  GI: Denies abdominal pain, constipation, diarrhea, nausea, vomiting  : Denies dysuria, hematuria, flank or back pain  ID: Denies fevers, chills  MSK: Denies joint pain   DERM: Denies any rashes, bruising, pruritis  NEURO: No headaches, blurry vision, double vision  ENDO: Denies heat or cold intolerances, changes in weight, thinning of hair  PSYCH: Denies any mood changes    VITAL SIGNS:  T(F): 98.1 (12-12-17 @ 01:51), Max: 98.2 (12-11-17 @ 13:46)  HR: 100 (12-11-17 @ 20:55) (74 - 108)  BP: 169/77 (12-11-17 @ 17:55) (132/60 - 173/81)  BP(mean): 110 (12-11-17 @ 17:55) (87 - 117)  RR: 17 (12-11-17 @ 20:55) (16 - 17)  SpO2: 96% (12-11-17 @ 20:55) (96% - 100%)      12-10-17 @ 07:01  -  12-11-17 @ 07:00  --------------------------------------------------------  IN: 600 mL / OUT: 0 mL / NET: 600 mL        PHYSICAL EXAM:    Constitutional: WDWN, NAD, resting comfortably   Head: NC/AT  Eyes: PERRL, EOMI, anicteric sclera, no mucosal pallor  ENT: no nasal discharge; uvula midline, no oropharyngeal erythema or exudates; MMM  Neck: supple; no JVD or thyromegaly, no lymphadenopathy  Respiratory: CTA B/L; no W/R/R, no retractions  Cardiac: +S1/S2; RRR; no M/R/G; PMI non-displaced  Gastrointestinal: abdomen soft, NT/ND; no rebound or guarding; +BSx4  Back: spine midline, no bony tenderness or step-offs; no CVAT B/L  Extremities: warm; no calf tenderness, no pedal edema, no cyanosis, no clubbing  Musculoskeletal: NROM x4; no joint swelling, tenderness or erythema  Vascular: 2+ radial, femoral; DP/PT pulses B/L  Dermatologic: B/L bruising of the shins  Lymphatic: no submandibular or cervical LAD  Neurologic: AAOx3; CNII-XII grossly intact; 5/5 strength throughout, sensory symmetrically intact in B/L LE   Psychiatric: pleasant and conversive; affect and characteristics of appearance, verbalizations, behaviors are appropriate      MEDICATIONS  (STANDING):  aspirin  chewable 81 milliGRAM(s) Oral daily  atorvastatin 80 milliGRAM(s) Oral at bedtime  cyanocobalamin 500 MICROGram(s) Oral daily  docusate sodium 100 milliGRAM(s) Oral two times a day  heparin  Injectable 5000 Unit(s) SubCutaneous every 12 hours  levothyroxine 75 MICROGram(s) Oral daily  lisinopril 2.5 milliGRAM(s) Oral daily  metoprolol     tartrate 50 milliGRAM(s) Oral two times a day  pantoprazole  Injectable 40 milliGRAM(s) IV Push daily  piperacillin/tazobactam IVPB. 3.375 Gram(s) IV Intermittent every 6 hours  polyethylene glycol 3350 17 Gram(s) Oral daily  sodium chloride 0.9%. 1000 milliLiter(s) (60 mL/Hr) IV Continuous <Continuous>    MEDICATIONS  (PRN):  acetaminophen   Tablet 650 milliGRAM(s) Oral every 6 hours PRN For Temp greater than 38 C (100.4 F)  acetaminophen   Tablet. 650 milliGRAM(s) Oral every 6 hours PRN Moderate Pain (4 - 6)  bisacodyl Suppository 10 milliGRAM(s) Rectal daily PRN Constipation      Allergies    No Known Allergies    Intolerances        LABS:                        6.6    9.8   )-----------( 268      ( 12 Dec 2017 06:50 )             20.7                         7.2    10.2  )-----------( 265      ( 11 Dec 2017 11:17 )             23.3   12-12    134<L>  |  103  |  17  ----------------------------<  91  4.2   |  20<L>  |  1.01    Ca    9.2      12 Dec 2017 06:50  Phos  2.5     12-12  Mg     1.8     12-12      12-11    134<L>  |  102  |  19  ----------------------------<  100<H>  3.8   |  21<L>  |  0.95    Ca    9.7      11 Dec 2017 06:43            RADIOLOGY & ADDITIONAL TESTS: HOSPITAL COURSE:  89F PMH breast cancer, AAA repair, GERD, HTN, anxiety, recent admission for sepsis 2/2 aspiration pneumonia, found to have hypercalcemia and lytic bone lesion w/ associated suspicious right lung mass(s/p bx 11/29) who presents w/ slurred speech. Pt outside the window for tPA.  Symptoms resolved on presentation NIHSS 0. 10-system ROS negative except for left rib pain  In the ED VS(T:99.1F HR:91 BP:117/72 RR:18 O2%:94 on RA ). Labs significant for WBC 14.8, H/H 8.2/25, Na 128, Ca 11.8(qing), UA w/ WBC, LE, bacteria. 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  MRA negative.  Admitted to  for acute stroke. ECHO w/ bubble showed intraatrial shunt.  Pt had a fever on 12/11, chest X ray from 12/10 with progression left effusion in comparison to prior examination of the chest 12/8/2017. Fever workup started, Blood cxs NGTD. Pt started on Vanc/zosyn. She was seen by heme/onc with no additional recommendation at this time. Pt received 1 uPRBC for Hb 6.6, (FOBT negative).     INTERVAL HPI/OVERNIGHT EVENTS: No acute events     ROS  CV: Denies chest pain, palpitations  RESP: Denies SOB  GI: Denies abdominal pain, constipation, diarrhea, nausea, vomiting  : Denies dysuria, hematuria, flank or back pain  ID: Denies fevers, chills  MSK: Denies joint pain   DERM: Denies any rashes, bruising, pruritis  NEURO: No headaches, blurry vision, double vision  ENDO: Denies heat or cold intolerances, changes in weight, thinning of hair  PSYCH: Denies any mood changes    VITAL SIGNS:  T(F): 98.1 (12-12-17 @ 01:51), Max: 98.2 (12-11-17 @ 13:46)  HR: 100 (12-11-17 @ 20:55) (74 - 108)  BP: 169/77 (12-11-17 @ 17:55) (132/60 - 173/81)  BP(mean): 110 (12-11-17 @ 17:55) (87 - 117)  RR: 17 (12-11-17 @ 20:55) (16 - 17)  SpO2: 96% (12-11-17 @ 20:55) (96% - 100%)      12-10-17 @ 07:01  -  12-11-17 @ 07:00  --------------------------------------------------------  IN: 600 mL / OUT: 0 mL / NET: 600 mL        PHYSICAL EXAM:    Constitutional: WDWN, NAD, resting comfortably   Head: NC/AT  Eyes: PERRL, EOMI, anicteric sclera, no mucosal pallor  ENT: no nasal discharge; uvula midline, no oropharyngeal erythema or exudates; MMM  Neck: supple; no JVD or thyromegaly, no lymphadenopathy  Respiratory: CTA B/L; no W/R/R, no retractions  Cardiac: +S1/S2; RRR; no M/R/G; PMI non-displaced  Gastrointestinal: abdomen soft, NT/ND; no rebound or guarding; +BSx4  Back: spine midline, no bony tenderness or step-offs; no CVAT B/L  Extremities: warm; no calf tenderness, no pedal edema, no cyanosis, no clubbing  Musculoskeletal: NROM x4; no joint swelling, tenderness or erythema  Vascular: 2+ radial, femoral; DP/PT pulses B/L  Dermatologic: B/L bruising of the shins  Lymphatic: no submandibular or cervical LAD  Neurologic: AAOx3; CNII-XII grossly intact; 5/5 strength throughout, sensory symmetrically intact in B/L LE   Psychiatric: pleasant and conversive; affect and characteristics of appearance, verbalizations, behaviors are appropriate      MEDICATIONS  (STANDING):  aspirin  chewable 81 milliGRAM(s) Oral daily  atorvastatin 80 milliGRAM(s) Oral at bedtime  cyanocobalamin 500 MICROGram(s) Oral daily  docusate sodium 100 milliGRAM(s) Oral two times a day  heparin  Injectable 5000 Unit(s) SubCutaneous every 12 hours  levothyroxine 75 MICROGram(s) Oral daily  lisinopril 2.5 milliGRAM(s) Oral daily  metoprolol     tartrate 50 milliGRAM(s) Oral two times a day  pantoprazole  Injectable 40 milliGRAM(s) IV Push daily  piperacillin/tazobactam IVPB. 3.375 Gram(s) IV Intermittent every 6 hours  polyethylene glycol 3350 17 Gram(s) Oral daily  sodium chloride 0.9%. 1000 milliLiter(s) (60 mL/Hr) IV Continuous <Continuous>    MEDICATIONS  (PRN):  acetaminophen   Tablet 650 milliGRAM(s) Oral every 6 hours PRN For Temp greater than 38 C (100.4 F)  acetaminophen   Tablet. 650 milliGRAM(s) Oral every 6 hours PRN Moderate Pain (4 - 6)  bisacodyl Suppository 10 milliGRAM(s) Rectal daily PRN Constipation      Allergies    No Known Allergies    Intolerances        LABS:                        6.6    9.8   )-----------( 268      ( 12 Dec 2017 06:50 )             20.7                         7.2    10.2  )-----------( 265      ( 11 Dec 2017 11:17 )             23.3   12-12    134<L>  |  103  |  17  ----------------------------<  91  4.2   |  20<L>  |  1.01    Ca    9.2      12 Dec 2017 06:50  Phos  2.5     12-12  Mg     1.8     12-12      12-11    134<L>  |  102  |  19  ----------------------------<  100<H>  3.8   |  21<L>  |  0.95    Ca    9.7      11 Dec 2017 06:43    RADIOLOGY & ADDITIONAL TESTS:

## 2017-12-12 NOTE — PROGRESS NOTE ADULT - PROBLEM SELECTOR PLAN 1
Slurred speech and lower extremity weakness this AM; last known normal last night (12/08/17) @ 7pm. 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 Patient only on simvastatin 10mg at home. Symptoms resolved while in ED. MRA H/N without occlusion   - NIHSS 0  - s/p  and Lipitor 80mg in ED. Will c/w ASA 81mg daily & Lipitor 80mg qhs  - echocardiogram w/ bubble showed intraatrial shunt. Will consider consult cards for possible closure of PFO Vs ASD after goals of care discussion with family  -LILY for further workup of cardioembolic etiology  -LE duplex for DVT rule out revealed No deep vein thrombosis seen above the knee. 5.3 cm heterogeneous hypoechoic structure within the left groin region   with internal debris and vascularity (inflamed and necrotic lymph node Vs evolving abscess).  - PT/OT: home w/ assist; home w/ home PT; with 24 hour assist  - stroke education  - DVT ppx    - passed bedside dysphagia, started on diet  -ASA/ statin  -Possible loop recorder placement Slurred speech and lower extremity weakness this AM; last known normal last night (12/08/17) @ 7pm. 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 Patient only on simvastatin 10mg at home. Symptoms resolved while in ED. MRA H/N without occlusion   - NIHSS 0  - s/p  and Lipitor 80mg in ED. Will c/w ASA 81mg daily & Lipitor 80mg qhs  - echocardiogram w/ bubble showed intraatrial shunt.   -LE duplex - no deep vein thrombosis seen above the knee. 5.3 cm heterogeneous hypoechoic structure within the left groin region with internal debris and vascularity (inflamed and necrotic lymph node Vs evolving abscess).  - PT/OT: home w/ home PT; with 24 hour assist  - stroke education  - DVT ppx    - passed bedside dysphagia, started on diet  - Family refused loop recorder placement but are interested in [employer] long term external cardiac monitoring to rule out atrial fibrillation.

## 2017-12-13 ENCOUNTER — TRANSCRIPTION ENCOUNTER (OUTPATIENT)
Age: 82
End: 2017-12-13

## 2017-12-13 VITALS — DIASTOLIC BLOOD PRESSURE: 79 MMHG | SYSTOLIC BLOOD PRESSURE: 166 MMHG | RESPIRATION RATE: 14 BRPM | HEART RATE: 70 BPM

## 2017-12-13 LAB
ALBUMIN SERPL ELPH-MCNC: 2.6 G/DL — LOW (ref 3.3–5)
ALP SERPL-CCNC: 149 U/L — HIGH (ref 40–120)
ALT FLD-CCNC: 27 U/L — SIGNIFICANT CHANGE UP (ref 10–45)
ANION GAP SERPL CALC-SCNC: 11 MMOL/L — SIGNIFICANT CHANGE UP (ref 5–17)
APPEARANCE UR: CLEAR — SIGNIFICANT CHANGE UP
APTT BLD: 29.3 SEC — SIGNIFICANT CHANGE UP (ref 27.5–37.4)
AST SERPL-CCNC: 41 U/L — HIGH (ref 10–40)
BASOPHILS NFR BLD AUTO: 0.2 % — SIGNIFICANT CHANGE UP (ref 0–2)
BILIRUB DIRECT SERPL-MCNC: 0.2 MG/DL — SIGNIFICANT CHANGE UP (ref 0–0.2)
BILIRUB DIRECT SERPL-MCNC: 0.2 MG/DL — SIGNIFICANT CHANGE UP (ref 0–0.2)
BILIRUB INDIRECT FLD-MCNC: 0.8 MG/DL — SIGNIFICANT CHANGE UP (ref 0.2–1)
BILIRUB INDIRECT FLD-MCNC: 0.8 MG/DL — SIGNIFICANT CHANGE UP (ref 0.2–1)
BILIRUB SERPL-MCNC: 1 MG/DL — SIGNIFICANT CHANGE UP (ref 0.2–1.2)
BILIRUB SERPL-MCNC: 1 MG/DL — SIGNIFICANT CHANGE UP (ref 0.2–1.2)
BILIRUB UR-MCNC: NEGATIVE — SIGNIFICANT CHANGE UP
BLD GP AB SCN SERPL QL: NEGATIVE — SIGNIFICANT CHANGE UP
BUN SERPL-MCNC: 16 MG/DL — SIGNIFICANT CHANGE UP (ref 7–23)
CALCIUM SERPL-MCNC: 9.8 MG/DL — SIGNIFICANT CHANGE UP (ref 8.4–10.5)
CHLORIDE SERPL-SCNC: 98 MMOL/L — SIGNIFICANT CHANGE UP (ref 96–108)
CO2 SERPL-SCNC: 21 MMOL/L — LOW (ref 22–31)
COLOR SPEC: YELLOW — SIGNIFICANT CHANGE UP
CREAT SERPL-MCNC: 1.03 MG/DL — SIGNIFICANT CHANGE UP (ref 0.5–1.3)
CULTURE RESULTS: SIGNIFICANT CHANGE UP
CULTURE RESULTS: SIGNIFICANT CHANGE UP
DATE OF TRANSF RX: NEGATIVE — SIGNIFICANT CHANGE UP
DIFF PNL FLD: NEGATIVE — SIGNIFICANT CHANGE UP
EOSINOPHIL NFR BLD AUTO: 0.4 % — SIGNIFICANT CHANGE UP (ref 0–6)
FERRITIN SERPL-MCNC: 641.3 NG/ML — HIGH (ref 15–150)
GLUCOSE SERPL-MCNC: 126 MG/DL — HIGH (ref 70–99)
GLUCOSE UR QL: NEGATIVE — SIGNIFICANT CHANGE UP
HCT VFR BLD CALC: 24.6 % — LOW (ref 34.5–45)
HCT VFR BLD CALC: 25.7 % — LOW (ref 34.5–45)
HGB BLD-MCNC: 8 G/DL — LOW (ref 11.5–15.5)
HGB BLD-MCNC: 8.3 G/DL — LOW (ref 11.5–15.5)
INR BLD: 1.17 — HIGH (ref 0.88–1.16)
IRON SATN MFR SERPL: 26 % — SIGNIFICANT CHANGE UP (ref 14–50)
IRON SATN MFR SERPL: 30 UG/DL — SIGNIFICANT CHANGE UP (ref 30–160)
KETONES UR-MCNC: NEGATIVE — SIGNIFICANT CHANGE UP
LDH SERPL L TO P-CCNC: 340 U/L — HIGH (ref 50–242)
LEUKOCYTE ESTERASE UR-ACNC: NEGATIVE — SIGNIFICANT CHANGE UP
LYMPHOCYTES # BLD AUTO: 6.7 % — LOW (ref 13–44)
MAGNESIUM SERPL-MCNC: 1.7 MG/DL — SIGNIFICANT CHANGE UP (ref 1.6–2.6)
MCHC RBC-ENTMCNC: 29 PG — SIGNIFICANT CHANGE UP (ref 27–34)
MCHC RBC-ENTMCNC: 29 PG — SIGNIFICANT CHANGE UP (ref 27–34)
MCHC RBC-ENTMCNC: 32.3 G/DL — SIGNIFICANT CHANGE UP (ref 32–36)
MCHC RBC-ENTMCNC: 32.5 G/DL — SIGNIFICANT CHANGE UP (ref 32–36)
MCV RBC AUTO: 89.1 FL — SIGNIFICANT CHANGE UP (ref 80–100)
MCV RBC AUTO: 89.9 FL — SIGNIFICANT CHANGE UP (ref 80–100)
MONOCYTES NFR BLD AUTO: 9.9 % — SIGNIFICANT CHANGE UP (ref 2–14)
NEUTROPHILS NFR BLD AUTO: 82.8 % — HIGH (ref 43–77)
NITRITE UR-MCNC: NEGATIVE — SIGNIFICANT CHANGE UP
PH UR: 5 — SIGNIFICANT CHANGE UP (ref 5–8)
PHOSPHATE SERPL-MCNC: 3.5 MG/DL — SIGNIFICANT CHANGE UP (ref 2.5–4.5)
PLATELET # BLD AUTO: 258 K/UL — SIGNIFICANT CHANGE UP (ref 150–400)
PLATELET # BLD AUTO: 321 K/UL — SIGNIFICANT CHANGE UP (ref 150–400)
POTASSIUM SERPL-MCNC: 3.5 MMOL/L — SIGNIFICANT CHANGE UP (ref 3.5–5.3)
POTASSIUM SERPL-SCNC: 3.5 MMOL/L — SIGNIFICANT CHANGE UP (ref 3.5–5.3)
PROT SERPL-MCNC: 7.8 G/DL — SIGNIFICANT CHANGE UP (ref 6–8.3)
PROT UR-MCNC: NEGATIVE MG/DL — SIGNIFICANT CHANGE UP
PROTHROM AB SERPL-ACNC: 13 SEC — HIGH (ref 9.8–12.7)
RBC # BLD: 2.76 M/UL — LOW (ref 3.8–5.2)
RBC # BLD: 2.86 M/UL — LOW (ref 3.8–5.2)
RBC # FLD: 15.7 % — SIGNIFICANT CHANGE UP (ref 10.3–16.9)
RBC # FLD: 16.4 % — SIGNIFICANT CHANGE UP (ref 10.3–16.9)
RH IG SCN BLD-IMP: POSITIVE — SIGNIFICANT CHANGE UP
SODIUM SERPL-SCNC: 130 MMOL/L — LOW (ref 135–145)
SP GR SPEC: 1.01 — SIGNIFICANT CHANGE UP (ref 1–1.03)
SPECIMEN SOURCE: SIGNIFICANT CHANGE UP
SPECIMEN SOURCE: SIGNIFICANT CHANGE UP
TIBC SERPL-MCNC: 115 UG/DL — LOW (ref 220–430)
TROPONIN T SERPL-MCNC: 0.04 NG/ML — HIGH (ref 0–0.01)
UIBC SERPL-MCNC: 85 UG/DL — LOW (ref 110–370)
UROBILINOGEN FLD QL: 0.2 E.U./DL — SIGNIFICANT CHANGE UP
WBC # BLD: 10.9 K/UL — HIGH (ref 3.8–10.5)
WBC # BLD: 12.6 K/UL — HIGH (ref 3.8–10.5)
WBC # FLD AUTO: 10.9 K/UL — HIGH (ref 3.8–10.5)
WBC # FLD AUTO: 12.6 K/UL — HIGH (ref 3.8–10.5)

## 2017-12-13 PROCEDURE — 87633 RESP VIRUS 12-25 TARGETS: CPT

## 2017-12-13 PROCEDURE — 86850 RBC ANTIBODY SCREEN: CPT

## 2017-12-13 PROCEDURE — P9016: CPT

## 2017-12-13 PROCEDURE — 87798 DETECT AGENT NOS DNA AMP: CPT

## 2017-12-13 PROCEDURE — 36430 TRANSFUSION BLD/BLD COMPNT: CPT

## 2017-12-13 PROCEDURE — 70450 CT HEAD/BRAIN W/O DYE: CPT

## 2017-12-13 PROCEDURE — 85610 PROTHROMBIN TIME: CPT

## 2017-12-13 PROCEDURE — 70551 MRI BRAIN STEM W/O DYE: CPT

## 2017-12-13 PROCEDURE — 84100 ASSAY OF PHOSPHORUS: CPT

## 2017-12-13 PROCEDURE — 97163 PT EVAL HIGH COMPLEX 45 MIN: CPT

## 2017-12-13 PROCEDURE — 70547 MR ANGIOGRAPHY NECK W/O DYE: CPT

## 2017-12-13 PROCEDURE — 71045 X-RAY EXAM CHEST 1 VIEW: CPT

## 2017-12-13 PROCEDURE — 82962 GLUCOSE BLOOD TEST: CPT

## 2017-12-13 PROCEDURE — 87086 URINE CULTURE/COLONY COUNT: CPT

## 2017-12-13 PROCEDURE — 87486 CHLMYD PNEUM DNA AMP PROBE: CPT

## 2017-12-13 PROCEDURE — 82330 ASSAY OF CALCIUM: CPT

## 2017-12-13 PROCEDURE — 82553 CREATINE MB FRACTION: CPT

## 2017-12-13 PROCEDURE — 86923 COMPATIBILITY TEST ELECTRIC: CPT

## 2017-12-13 PROCEDURE — 81003 URINALYSIS AUTO W/O SCOPE: CPT

## 2017-12-13 PROCEDURE — 93306 TTE W/DOPPLER COMPLETE: CPT

## 2017-12-13 PROCEDURE — 83880 ASSAY OF NATRIURETIC PEPTIDE: CPT

## 2017-12-13 PROCEDURE — 82247 BILIRUBIN TOTAL: CPT

## 2017-12-13 PROCEDURE — 93970 EXTREMITY STUDY: CPT

## 2017-12-13 PROCEDURE — 85730 THROMBOPLASTIN TIME PARTIAL: CPT

## 2017-12-13 PROCEDURE — 83615 LACTATE (LD) (LDH) ENZYME: CPT

## 2017-12-13 PROCEDURE — 85025 COMPLETE CBC W/AUTO DIFF WBC: CPT

## 2017-12-13 PROCEDURE — 80048 BASIC METABOLIC PNL TOTAL CA: CPT

## 2017-12-13 PROCEDURE — 85027 COMPLETE CBC AUTOMATED: CPT

## 2017-12-13 PROCEDURE — 80202 ASSAY OF VANCOMYCIN: CPT

## 2017-12-13 PROCEDURE — 70544 MR ANGIOGRAPHY HEAD W/O DYE: CPT

## 2017-12-13 PROCEDURE — 80076 HEPATIC FUNCTION PANEL: CPT

## 2017-12-13 PROCEDURE — 96375 TX/PRO/DX INJ NEW DRUG ADDON: CPT

## 2017-12-13 PROCEDURE — 87040 BLOOD CULTURE FOR BACTERIA: CPT

## 2017-12-13 PROCEDURE — 84484 ASSAY OF TROPONIN QUANT: CPT

## 2017-12-13 PROCEDURE — 82248 BILIRUBIN DIRECT: CPT

## 2017-12-13 PROCEDURE — 83550 IRON BINDING TEST: CPT

## 2017-12-13 PROCEDURE — 82728 ASSAY OF FERRITIN: CPT

## 2017-12-13 PROCEDURE — 83605 ASSAY OF LACTIC ACID: CPT

## 2017-12-13 PROCEDURE — 83735 ASSAY OF MAGNESIUM: CPT

## 2017-12-13 PROCEDURE — 96374 THER/PROPH/DIAG INJ IV PUSH: CPT

## 2017-12-13 PROCEDURE — 82550 ASSAY OF CK (CPK): CPT

## 2017-12-13 PROCEDURE — 97530 THERAPEUTIC ACTIVITIES: CPT

## 2017-12-13 PROCEDURE — 86901 BLOOD TYPING SEROLOGIC RH(D): CPT

## 2017-12-13 PROCEDURE — 80061 LIPID PANEL: CPT

## 2017-12-13 PROCEDURE — 83930 ASSAY OF BLOOD OSMOLALITY: CPT

## 2017-12-13 PROCEDURE — 99238 HOSP IP/OBS DSCHRG MGMT 30/<: CPT

## 2017-12-13 PROCEDURE — 97116 GAIT TRAINING THERAPY: CPT

## 2017-12-13 PROCEDURE — 81001 URINALYSIS AUTO W/SCOPE: CPT

## 2017-12-13 PROCEDURE — 36415 COLL VENOUS BLD VENIPUNCTURE: CPT

## 2017-12-13 PROCEDURE — 93005 ELECTROCARDIOGRAM TRACING: CPT

## 2017-12-13 PROCEDURE — 87581 M.PNEUMON DNA AMP PROBE: CPT

## 2017-12-13 PROCEDURE — 86900 BLOOD TYPING SEROLOGIC ABO: CPT

## 2017-12-13 PROCEDURE — 80053 COMPREHEN METABOLIC PANEL: CPT

## 2017-12-13 PROCEDURE — 99285 EMERGENCY DEPT VISIT HI MDM: CPT | Mod: 25

## 2017-12-13 RX ORDER — ASPIRIN/CALCIUM CARB/MAGNESIUM 324 MG
1 TABLET ORAL
Qty: 30 | Refills: 0 | OUTPATIENT
Start: 2017-12-13 | End: 2018-01-11

## 2017-12-13 RX ORDER — PREGABALIN 225 MG/1
1 CAPSULE ORAL
Qty: 0 | Refills: 0 | COMMUNITY

## 2017-12-13 RX ORDER — LISINOPRIL 2.5 MG/1
1 TABLET ORAL
Qty: 30 | Refills: 0 | OUTPATIENT
Start: 2017-12-13 | End: 2018-01-11

## 2017-12-13 RX ORDER — LEVOTHYROXINE SODIUM 125 MCG
1 TABLET ORAL
Qty: 0 | Refills: 0 | COMMUNITY

## 2017-12-13 RX ORDER — FERROUS SULFATE 325(65) MG
325 TABLET ORAL DAILY
Qty: 0 | Refills: 0 | Status: DISCONTINUED | OUTPATIENT
Start: 2017-12-13 | End: 2017-12-13

## 2017-12-13 RX ORDER — FERROUS SULFATE 325(65) MG
1 TABLET ORAL
Qty: 30 | Refills: 0 | OUTPATIENT
Start: 2017-12-13 | End: 2018-01-11

## 2017-12-13 RX ORDER — POTASSIUM CHLORIDE 20 MEQ
40 PACKET (EA) ORAL ONCE
Qty: 0 | Refills: 0 | Status: COMPLETED | OUTPATIENT
Start: 2017-12-13 | End: 2017-12-13

## 2017-12-13 RX ORDER — PREGABALIN 225 MG/1
1 CAPSULE ORAL
Qty: 0 | Refills: 0 | COMMUNITY
Start: 2017-12-13

## 2017-12-13 RX ORDER — ATORVASTATIN CALCIUM 80 MG/1
1 TABLET, FILM COATED ORAL
Qty: 30 | Refills: 0 | OUTPATIENT
Start: 2017-12-13 | End: 2018-01-11

## 2017-12-13 RX ORDER — LEVOTHYROXINE SODIUM 125 MCG
1 TABLET ORAL
Qty: 0 | Refills: 0 | COMMUNITY
Start: 2017-12-13

## 2017-12-13 RX ORDER — CIPROFLOXACIN LACTATE 400MG/40ML
1 VIAL (ML) INTRAVENOUS
Qty: 1 | Refills: 0 | OUTPATIENT
Start: 2017-12-13 | End: 2017-12-13

## 2017-12-13 RX ORDER — MAGNESIUM SULFATE 500 MG/ML
1 VIAL (ML) INJECTION ONCE
Qty: 0 | Refills: 0 | Status: DISCONTINUED | OUTPATIENT
Start: 2017-12-13 | End: 2017-12-13

## 2017-12-13 RX ORDER — METOPROLOL TARTRATE 50 MG
1 TABLET ORAL
Qty: 60 | Refills: 0 | OUTPATIENT
Start: 2017-12-13 | End: 2018-01-11

## 2017-12-13 RX ORDER — METOPROLOL TARTRATE 50 MG
1 TABLET ORAL
Qty: 0 | Refills: 0 | COMMUNITY
Start: 2017-12-13

## 2017-12-13 RX ORDER — SIMVASTATIN 20 MG/1
1 TABLET, FILM COATED ORAL
Qty: 0 | Refills: 0 | COMMUNITY

## 2017-12-13 RX ADMIN — PIPERACILLIN AND TAZOBACTAM 200 GRAM(S): 4; .5 INJECTION, POWDER, LYOPHILIZED, FOR SOLUTION INTRAVENOUS at 06:24

## 2017-12-13 RX ADMIN — Medication 81 MILLIGRAM(S): at 12:41

## 2017-12-13 RX ADMIN — Medication 40 MILLIEQUIVALENT(S): at 12:41

## 2017-12-13 RX ADMIN — HEPARIN SODIUM 5000 UNIT(S): 5000 INJECTION INTRAVENOUS; SUBCUTANEOUS at 06:24

## 2017-12-13 RX ADMIN — Medication 50 MILLIGRAM(S): at 06:25

## 2017-12-13 RX ADMIN — Medication 100 MILLIGRAM(S): at 06:24

## 2017-12-13 RX ADMIN — POLYETHYLENE GLYCOL 3350 17 GRAM(S): 17 POWDER, FOR SOLUTION ORAL at 12:42

## 2017-12-13 RX ADMIN — Medication 325 MILLIGRAM(S): at 12:41

## 2017-12-13 RX ADMIN — PREGABALIN 500 MICROGRAM(S): 225 CAPSULE ORAL at 12:41

## 2017-12-13 RX ADMIN — LISINOPRIL 2.5 MILLIGRAM(S): 2.5 TABLET ORAL at 06:25

## 2017-12-13 RX ADMIN — Medication 75 MICROGRAM(S): at 06:25

## 2017-12-13 RX ADMIN — PANTOPRAZOLE SODIUM 40 MILLIGRAM(S): 20 TABLET, DELAYED RELEASE ORAL at 12:42

## 2017-12-13 RX ADMIN — PIPERACILLIN AND TAZOBACTAM 200 GRAM(S): 4; .5 INJECTION, POWDER, LYOPHILIZED, FOR SOLUTION INTRAVENOUS at 00:42

## 2017-12-13 NOTE — DISCHARGE NOTE ADULT - CARE PLAN
Principal Discharge DX:	CVA (cerebral vascular accident)  Goal:	follow-up  Instructions for follow-up, activity and diet:	You were started on a statin, Aspirin given your history of stroke.  Secondary Diagnosis:	Pneumonia  Goal:	follow-up  Instructions for follow-up, activity and diet:	You were found to have a pneumonia and were treated with vancomycin and zosyn in the hospital. She will be discharged with levaquin that is dosed for kidney function, for a total of 7 days. You should followup with your primary care provider.  Secondary Diagnosis:	Lung mass  Goal:	follow-up  Instructions for follow-up, activity and diet:	You should follow-up as an outpatient with Dr. Valdez, your oncologist. Principal Discharge DX:	CVA (cerebral vascular accident)  Goal:	follow-up  Instructions for follow-up, activity and diet:	You were started on a statin and Aspirin given your history of stroke.  Secondary Diagnosis:	Pneumonia  Goal:	follow-up  Instructions for follow-up, activity and diet:	You were found to have a pneumonia and were treated with vancomycin and zosyn in the hospital. She will be discharged with levaquin that is dosed for kidney function, for a total of 7 days. You should followup with your primary care provider.  Secondary Diagnosis:	Lung mass  Goal:	follow-up  Instructions for follow-up, activity and diet:	You should follow-up as an outpatient with Dr. Valdez, your oncologist.  Secondary Diagnosis:	Anemia  Goal:	follow-up  Instructions for follow-up, activity and diet:	You were started on iron supplementation. You should continue the iron and followup as an outpatient with your primary medical doctor Principal Discharge DX:	CVA (cerebral vascular accident)  Goal:	follow-up  Instructions for follow-up, activity and diet:	You were started on a statin and Aspirin given your history of stroke. You should continue these medications and you should follow up with Dr. Solis within one month after you get cardionet delivered to your home.  Secondary Diagnosis:	Pneumonia  Goal:	follow-up  Instructions for follow-up, activity and diet:	You were found to have a pneumonia and were treated with vancomycin and zosyn in the hospital. She will be discharged with levaquin that is dosed for kidney function, for a total of 7 days. You should followup with your primary care provider.  Secondary Diagnosis:	Lung mass  Goal:	follow-up  Instructions for follow-up, activity and diet:	You should follow-up as an outpatient with Dr. Valdez, your oncologist.  Secondary Diagnosis:	Anemia  Goal:	follow-up  Instructions for follow-up, activity and diet:	You were started on iron supplementation. You should continue the iron and followup as an outpatient with your primary medical doctor Principal Discharge DX:	CVA (cerebral vascular accident)  Goal:	follow-up  Instructions for follow-up, activity and diet:	You were started on a statin and Aspirin given your history of stroke. You should continue these medications and you should follow up with Dr. Solis within one month after you get cardionet delivered to your home.  Secondary Diagnosis:	Pneumonia  Goal:	follow-up  Instructions for follow-up, activity and diet:	You were found to have a pneumonia and were treated with vancomycin and zosyn in the hospital. She will be discharged with levaquin that is dosed for kidney function, for a total of 7 days. You should followup with your primary care provider.  Secondary Diagnosis:	Lung mass  Goal:	follow-up  Instructions for follow-up, activity and diet:	You should follow-up as an outpatient with Dr. Valdez, your oncologist.  Secondary Diagnosis:	Anemia  Goal:	follow-up  Instructions for follow-up, activity and diet:	You were started on iron supplementation. You should continue the iron and followup as an outpatient with your primary medical doctor  Secondary Diagnosis:	HTN (hypertension)  Goal:	follow up  Instructions for follow-up, activity and diet:	You should continue on your metoprolol and we started lisinopril at a low dose. You should follow up with Dr. Solis and Dr. Vallecillo.

## 2017-12-13 NOTE — DISCHARGE NOTE ADULT - PLAN OF CARE
follow-up You were started on a statin, Aspirin given your history of stroke. You were found to have a pneumonia and were treated with vancomycin and zosyn in the hospital. She will be discharged with levaquin that is dosed for kidney function, for a total of 7 days. You should followup with your primary care provider. You should follow-up as an outpatient with Dr. Valdez, your oncologist. You were started on a statin and Aspirin given your history of stroke. You were started on iron supplementation. You should continue the iron and followup as an outpatient with your primary medical doctor You were started on a statin and Aspirin given your history of stroke. You should continue these medications and you should follow up with Dr. Solis within one month after you get cardionet delivered to your home. follow up You should continue on your metoprolol and we started lisinopril at a low dose. You should follow up with Dr. Solis and Dr. Vallecillo.

## 2017-12-13 NOTE — PROGRESS NOTE ADULT - ATTENDING COMMENTS
Patient seen and examined.  Agree with above.  Patient is stable post transfusion last night with normotension and no tachycardia or tachypnea.  Iron level is still relatively low so will start Iron 325mg daily.  Reassess if she develops constipation.  Patient neurologically cleared for discharge on Aspirin 81mg and Atorvastatin.  Decisions regarding anticoagulation after results of Cardionet.  Discussed Cardionet that will be sent to patient's house for use over 21 days.  Her Aide acknowledged the instructions.  She should follow up in my office one month after Cardionet arrives at her house.    Her  and daughters agree with above plan.

## 2017-12-13 NOTE — DISCHARGE NOTE ADULT - CARE PROVIDERS DIRECT ADDRESSES
[email or web address],DirectAddress_Unknown

## 2017-12-13 NOTE — DISCHARGE NOTE ADULT - PATIENT PORTAL LINK FT
“You can access the [facility] Patient Portal, offered by [facility], by registering with the following website: [email or web address]”

## 2017-12-13 NOTE — PROGRESS NOTE ADULT - PROBLEM SELECTOR PLAN 1
Slurred speech and lower extremity weakness this AM; last known normal last night (12/08/17) @ 7pm. 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 Patient only on simvastatin 10mg at home. Symptoms resolved while in ED. MRA H/N without occlusion   - NIHSS 0  - s/p  and Lipitor 80mg in ED. Will c/w ASA 81mg daily & Lipitor 80mg qhs  - echocardiogram w/ bubble showed intraatrial shunt.   -LE duplex - no deep vein thrombosis seen above the knee. 5.3 cm heterogeneous hypoechoic structure within the left groin region with internal debris and vascularity (inflamed and necrotic lymph node Vs evolving abscess).  - PT/OT: home w/ home PT; with 24 hour assist  - stroke education  - DVT ppx    - passed bedside dysphagia, started on diet  - Family refused loop recorder placement but are interested in Cardionet long term external cardiac monitoring to rule out atrial fibrillation. Slurred speech and lower extremity weakness on admission; last known normal(12/08/17) @ 7pm. 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 Symptoms resolved while in ED. MRA H/N without occlusion   - NIHSS 0  - s/p  and Lipitor 80mg in ED. Will c/w ASA 81mg daily & Lipitor 80mg qhs  - echocardiogram w/ bubble showed intraatrial shunt.   -LE duplex - no deep vein thrombosis seen above the knee. 5.3 cm heterogeneous hypoechoic structure within the left groin region with internal debris and vascularity (inflamed and necrotic lymph node Vs evolving abscess).  - PT/OT: home w/ home PT; with 24 hour assist  - stroke education  - DVT ppx    - passed bedside dysphagia, started on diet  - Family refused loop recorder placement but are interested in Cardionet long term external cardiac monitoring to rule out atrial fibrillation. Slurred speech and lower extremity weakness on admission; last known normal(12/08/17) @ 7pm. 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 Symptoms resolved while in ED. MRA H/N without occlusion   - NIHSS 0  - s/p  and Lipitor 80mg in ED. c/w ASA 81mg daily & Lipitor 80mg qhs  - echocardiogram w/ bubble showed intraatrial shunt.   -LE duplex - no deep vein thrombosis seen above the knee. 5.3 cm heterogeneous hypoechoic structure within the left groin region with internal debris and vascularity (inflamed and necrotic lymph node Vs evolving abscess).  - PT/OT: home w/ home PT; with 24 hour assist  - stroke education  - DVT ppx    - passed bedside dysphagia, started on diet  - Family refused loop recorder placement but are interested in Cardionet long term external cardiac monitoring to rule out atrial fibrillation.  No decision about anticoagulation at this time.

## 2017-12-13 NOTE — DIETITIAN INITIAL EVALUATION ADULT. - PROBLEM SELECTOR PLAN 1
Slurred speech and lower extremity weakness this AM; last known normal last night (12/08/17) @ 7pm. 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 Patient only on simvastatin 10mg at home. Symptoms resolved while in ED. MRA H/N without occlusion   - NIHSS 0  - s/p  and Lipitor 80mg in ED. Will c/w ASA 81mg daily & Lipitor 80mg qhs  - echocardiogram w/ bubble study   - PT/OT  - stroke education  - DVT ppx   - telemetry monitoring   - bedside dysphagia

## 2017-12-13 NOTE — DISCHARGE NOTE ADULT - INSTRUCTIONS
You should eat plenty of fruits and vegetables and avoid high fat foods and high salt foods. Follow up w your MD. Report any weakness, fever, dizziness, or any other unusual symptoms.

## 2017-12-13 NOTE — DISCHARGE NOTE ADULT - CARE PROVIDER_API CALL
Antonia Solis), Neurology; Vascular Neurology  [address] [address]  Phone: [phone]  Fax: [phone]    Azar Valdez), Medicine  [address] [address]  Phone: [phone]  Fax: [phone] Antonia Solis), Neurology; Vascular Neurology  [address] [address]  Phone: [phone]  Fax: [phone]    Azar Valdez (MD), Medicine  [address] [address]  Phone: [phone]  Fax: [phone]    Epifanio Vallecillo), Internal Medicine  [address] [address]  Phone: [phone]  Fax: [phone]

## 2017-12-13 NOTE — PROGRESS NOTE ADULT - PROVIDER SPECIALTY LIST ADULT
Heme/Onc
Internal Medicine
Neurology
Rehab Medicine
Heme/Onc
Neurology

## 2017-12-13 NOTE — PROGRESS NOTE ADULT - PROBLEM SELECTOR PLAN 10
Diet: DASH/TLC  monitor/ replete electrolytes K>4, Mg>2     DNR/DNI    Dispo: 7 [facility] Diet: DASH/TLC  monitor/ replete electrolytes K>4, Mg>2   F: no IVF    DNR/DNI    Dispo:  [facility]

## 2017-12-13 NOTE — DIETITIAN INITIAL EVALUATION ADULT. - OTHER INFO
Pt presented w/ slurred speech secondary to embolic infarcts, found to have hypercalcemia and lytic bone lesion w/ associated right lung mass. Per family pt had good intake PTA, stable weight.  Pt does not follow any specific diet, prefers soft consistency. Good intake and tolerance of breakfast today.  GOC pending.

## 2017-12-13 NOTE — PROGRESS NOTE ADULT - PROBLEM SELECTOR PROBLEM 5
Breast cancer in female

## 2017-12-13 NOTE — DISCHARGE NOTE ADULT - MEDICATION SUMMARY - MEDICATIONS TO TAKE
I will START or STAY ON the medications listed below when I get home from the hospital:    aspirin 81 mg oral tablet, chewable  -- 1 tab(s) by mouth once a day  -- Indication: For Stroke    lisinopril 2.5 mg oral tablet  -- 1 tab(s) by mouth once a day  -- Indication: For HTN (hypertension)    atorvastatin 80 mg oral tablet  -- 1 tab(s) by mouth once a day (at bedtime) if patient has muscle aches, she should stop this medication and see a doctor  -- Indication: For Stroke    zolpidem 5 mg oral tablet  -- 1 tab(s) by mouth once a day (at bedtime), As Needed  -- Indication: For insomnia    metoprolol tartrate 50 mg oral tablet  -- 1 tab(s) by mouth 2 times a day  -- Indication: For HTN (hypertension)    famotidine 20 mg oral tablet  -- 1 tab(s) by mouth once a day  -- Indication: For GERD (gastroesophageal reflux disease)    ferrous sulfate 325 mg (65 mg elemental iron) oral tablet  -- 1 tab(s) by mouth once a day   -- Indication: For Anemia    Colace 50 mg oral capsule  -- 1 cap(s) by mouth 2 times a day, As Needed for constipation  -- Indication: For Constipation    sodium chloride, hypertonic 5% ophthalmic solution  -- 1 application to each affected eye 3 times a day  -- Indication: For Dry eyes    levoFLOXacin 750 mg oral tablet  -- 1 tab(s) by mouth once please take this one dose on 12/15/17  -- Indication: For Pneumonia    levothyroxine 75 mcg (0.075 mg) oral tablet  -- 1 tab(s) by mouth once a day  -- Indication: For Hypothyroid    cyanocobalamin 500 mcg oral tablet  -- 1 tab(s) by mouth once a day  -- Indication: For Supplement

## 2017-12-13 NOTE — DISCHARGE NOTE ADULT - NS AS DC STROKE ED MATERIALS
Stroke Education Booklet/Prescribed Medications/Need for Followup After Discharge/Risk Factors for Stroke/Stroke Warning Signs and Symptoms/Call 911 for Stroke

## 2017-12-13 NOTE — DIETITIAN INITIAL EVALUATION ADULT. - PROBLEM SELECTOR PLAN 2
recent hospitalization for hypercalcemia in setting of malignancy. 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 Ca normal this admission. Managed by Dr. Valdez.   -recent labs show low PTH, low PTHrP  -ionized calcium  -no changes on telemetry/EKG  -monitor electrolytes, IVF hydration   -f/u with Dr. Valdez rec's in AM

## 2017-12-13 NOTE — PROGRESS NOTE ADULT - PROBLEM SELECTOR PLAN 4
lytic lesion on 6th rib not suggestive of breast CA mets, rather new malignancy.   -Per palliative, hospice care discussed as an extra layer of support if pt declines despite the supportive care offered by Dr Roper.  Family appears open to supportive care with a plan to transition to home hospice care when/if pt declines further. lytic lesion on 6th rib not suggestive of breast CA mets, rather new malignancy.   -Per palliative, hospice care discussed as an extra layer of support if pt declines despite the supportive care offered by Dr Gonzalez.  Family appears open to supportive care with a plan to transition to home hospice care when/if pt declines further.

## 2017-12-13 NOTE — PROGRESS NOTE ADULT - PROBLEM SELECTOR PLAN 3
recent hospitalization for hypercalcemia in setting of malignancy. 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 Ca normal this admission.   Pt family, pt may have outpt follow up with Dr Roper and IV fluid if necessary to address hypercalcemia.   -recent labs show low PTH, low PTHrP  -ionized calcium elevated (1.52)  -no changes on telemetry/EKG  -monitor electrolytes, IVF hydration   -f/u with Dr. Valdez rec's       # Anemia: Hb 6.6 today; appears to be iron deficiency anemia based on iron studies from prior labs. Will get type and screen and transfuse 1u PRBC. FOBT negative  c/w 40 mg IV protonix PO daily recent hospitalization for hypercalcemia in setting of malignancy. 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 Ca normal this admission.   Pt family, pt may have outpt follow up with Dr oRper and IV fluid if necessary to address hypercalcemia.   -recent labs show low PTH, low PTHrP  -ionized calcium elevated (1.52)  -no changes on telemetry/EKG  -monitor electrolytes, IVF hydration   -f/u with Dr. Valdez rec's       # Anemia: Hb 6.6 yesterday; appears to be iron deficiency anemia based on iron studies from prior labs. got 1u PRBC. FOBT negative Hgb stable  started iron supplement

## 2017-12-13 NOTE — DISCHARGE NOTE ADULT - HOSPITAL COURSE
89F PMH breast cancer, AAA repair, GERD, HTN, anxiety, recent admission for sepsis 2/2 aspiration pneumonia, found to have hypercalcemia and lytic bone lesion w/ associated suspicious right lung mass(s/p bx 11/29) who presents w/ slurred speech. Pt outside the window for tPA.  Symptoms resolved on presentation NIHSS 0. 10-system ROS negative except for left rib pain. In the ED VS(T:99.1F HR:91 BP:117/72 RR:18 O2%:94 on RA ). Labs significant for WBC 14.8, H/H 8.2/25, Na 128, Ca 11.8(qing), UA w/ WBC, LE, bacteria. 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  MRA negative.  Admitted to  for acute stroke. ECHO w/ bubble showed intraatrial shunt.  Pt had a fever on 12/11, chest X ray from 12/10 with progression left effusion in comparison to prior examination of the chest 12/8/2017. Fever workup started, Blood cxs NGTD. Pt started on Vanc/zosyn. She was seen by heme/onc with no additional recommendation at this time. Pt received 1 uPRBC for Hb 6.6, (FOBT negative). The patient had a transfusion reaction, fever and hypertension that resolved. The patient's hemoglobin was stable after transfusion and the patient was initiated on iron supplementation    The patient is stable and ready for discharge and will complete a cardionet and one month after this is delivered, she will follow-up with Dr. Solis. 89F PMH breast cancer, AAA repair, GERD, HTN, anxiety, recent admission for sepsis 2/2 aspiration pneumonia, found to have hypercalcemia and lytic bone lesion w/ associated suspicious right lung mass(s/p bx 11/29) who presents w/ slurred speech. Pt outside the window for tPA.  Symptoms resolved on presentation NIHSS 0. 10-system ROS negative except for left rib pain. In the ED VS(T:99.1F HR:91 BP:117/72 RR:18 O2%:94 on RA ). Labs significant for WBC 14.8, H/H 8.2/25, Na 128, Ca 11.8(qing), UA w/ WBC, LE, bacteria. 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  MRA negative.  Admitted to  for acute stroke. ECHO w/ bubble showed intraatrial shunt.  Pt had a fever on 12/11, chest X ray from 12/10 with progression left effusion in comparison to prior examination of the chest 12/8/2017. Fever workup started, Blood cxs NGTD. Pt started on Vanc/zosyn. She was seen by heme/onc with no additional recommendation at this time. Pt received 1 uPRBC for Hb 6.6, (FOBT negative). The patient had a transfusion reaction, fever and hypertension that resolved. The patient's hemoglobin was stable after transfusion and the patient was initiated on iron supplementation.    The patient is stable and ready for discharge and will complete a cardionet and one month after this is delivered, she will follow-up with Dr. Solis and you should follow-up with Dr. Valdez. 89F PMH breast cancer, AAA repair, GERD, HTN, anxiety, recent admission for sepsis 2/2 aspiration pneumonia, found to have hypercalcemia and lytic bone lesion w/ associated suspicious right lung mass(s/p bx 11/29) who presents w/ slurred speech. Pt outside the window for tPA.  Symptoms resolved on presentation NIHSS 0. 10-system ROS negative except for left rib pain. In the ED VS(T:99.1F HR:91 BP:117/72 RR:18 O2%:94 on RA ). Labs significant for WBC 14.8, H/H 8.2/25, Na 128, Ca 11.8(qing), UA w/ WBC, LE, bacteria. 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  MRA negative.  Admitted to  for acute stroke. ECHO w/ bubble showed intraatrial shunt.  Pt had a fever on 12/11, chest X ray from 12/10 with progression left effusion in comparison to prior examination of the chest 12/8/2017. Fever workup started, Blood cxs NGTD. Pt started on Vanc/zosyn. She was seen by heme/onc with no additional recommendation at this time. Pt received 1 uPRBC for Hb 6.6, (FOBT negative). The patient had a transfusion reaction, fever and hypertension that resolved. he patient's hemoglobin was stable after transfusion and the patient was initiated on iron supplementation.    The patient is stable and ready for discharge and will complete a cardionet and one month after this is delivered, she will follow-up with Dr. Solis and you should follow-up with Dr. Valdez.

## 2017-12-13 NOTE — PROGRESS NOTE ADULT - PROBLEM SELECTOR PROBLEM 8
GERD (gastroesophageal reflux disease)

## 2017-12-13 NOTE — PROGRESS NOTE ADULT - PROBLEM SELECTOR PLAN 5
lytic lesion on 6th rib not suggestive of breast CA mets, rather new malignancy.   - f/u with heme/onc as outpt
lytic lesion on 6th rib not suggestive of breast CA mets, rather new malignancy.   - f/u Dr. Valdez
lytic lesion on 6th rib not suggestive of breast CA mets, rather new malignancy.   - f/u Dr. Valdez
lytic lesion on 6th rib not suggestive of breast CA mets, rather new malignancy.   - f/u Dr. Valdez recs
lytic lesion on 6th rib not suggestive of breast CA mets, rather new malignancy.   - f/u with heme/onc as outpt

## 2017-12-13 NOTE — DIETITIAN INITIAL EVALUATION ADULT. - PROBLEM SELECTOR PLAN 4
discharged on Nov 17th w/ diagnosis of CAP s/p treatment  presents to ED w/ fever 100.4, 2/4SIRS without lactate. No clear source of infection. PNA possible however CXR with improvement of effusions and infiltrate since last admission. UA not clean catch. s/p Vancomycin and Zosyn in ED  - will hold off on abx   - repeat CXR in AM  - trend WBC  - f/u blood and urine cultures

## 2017-12-13 NOTE — PROGRESS NOTE ADULT - PROBLEM SELECTOR PROBLEM 1
CVA (cerebral vascular accident)

## 2017-12-13 NOTE — PROGRESS NOTE ADULT - ASSESSMENT
89F PMH breast cancer, AAA repair, GERD, HTN, anxiety, recent admission for sepsis 2/2 aspiration pneumonia, found to have hypercalcemia and lytic bone lesion w/ associated suspicious right lung mass(s/p bx 11/29) who presents w/ slurred speech noted this morning secondary to embolic infarcts. 89F PMH breast cancer, AAA repair, GERD, HTN, anxiety, recent admission for sepsis 2/2 aspiration pneumonia, found to have hypercalcemia and lytic bone lesion w/ associated suspicious right lung mass(s/p bx 11/29) who presented w/ slurred speech found to be secondary to embolic infarcts and has an echo with bubble showed intra-atrial shunt now stable for d/c with plan for cardionet as an outpatient and follow up with Dr. Solis 89F PMH breast cancer, AAA repair, GERD, HTN, anxiety, recent admission for sepsis 2/2 aspiration pneumonia, found to have hypercalcemia and lytic bone lesion w/ associated suspicious right lung mass(s/p bx 11/29) who presented w/ slurred speech secondary to embolic infarcts and has an echo with bubble showed intra-atrial shunt now stable for d/c with plan for cardionet as an outpatient and follow up with Dr. Solis

## 2017-12-13 NOTE — PROGRESS NOTE ADULT - SUBJECTIVE AND OBJECTIVE BOX
***INCOMPLETE  INTERVAL HPI/OVERNIGHT EVENTS:    VITAL SIGNS:  T(F): 96.9 (17 @ 06:04)  HR: 72 (17 @ 04:55)  BP: 115/60 (17 @ 04:55)  RR: 18 (17 @ 04:55)  SpO2: 99% (17 @ 04:55)  Wt(kg): --    PHYSICAL EXAM:    Constitutional: Well developed, well nourished  General: laying comfortably  Eyes: PERRL  ENMT: moist mucous membranes, no mucosal pallor, clear throat, uvula midline  Neck: supple  Respiratory: CTABL, no rales, no crackles, no wheezing  Cardiovascular: +S1, +S2, no murmurs, rubs or gallops, regular rate and rhythm  Gastrointestinal: abdomen soft, non distended, non tender, +BS  Extremities: no edema, no calf pain to palpation  Vascular: cap refill <3s in all extremities, radial and DP pulses 2+  Neurological: AAO x3  Skin: no rashes    MEDICATIONS  (STANDING):  aspirin  chewable 81 milliGRAM(s) Oral daily  atorvastatin 80 milliGRAM(s) Oral at bedtime  cyanocobalamin 500 MICROGram(s) Oral daily  docusate sodium 100 milliGRAM(s) Oral two times a day  heparin  Injectable 5000 Unit(s) SubCutaneous every 12 hours  levothyroxine 75 MICROGram(s) Oral daily  lisinopril 2.5 milliGRAM(s) Oral daily  metoprolol     tartrate 50 milliGRAM(s) Oral two times a day  pantoprazole  Injectable 40 milliGRAM(s) IV Push daily  piperacillin/tazobactam IVPB. 3.375 Gram(s) IV Intermittent every 6 hours  polyethylene glycol 3350 17 Gram(s) Oral daily    MEDICATIONS  (PRN):  acetaminophen   Tablet 650 milliGRAM(s) Oral every 6 hours PRN For Temp greater than 38 C (100.4 F)  acetaminophen   Tablet. 650 milliGRAM(s) Oral every 6 hours PRN Moderate Pain (4 - 6)  bisacodyl Suppository 10 milliGRAM(s) Rectal daily PRN Constipation      Allergies    No Known Allergies    Intolerances        LABS:                        8.3    12.6  )-----------( 321      ( 13 Dec 2017 00:50 )             25.7     12-12    131<L>  |  99  |  16  ----------------------------<  123<H>  4.3   |  18<L>  |  1.02    Ca    10.0      12 Dec 2017 22:33  Phos  2.5     12  Mg     1.9         TPro  7.8  /  Alb  2.6<L>  /  TBili  1.0  /  DBili  0.2  /  AST  41<H>  /  ALT  27  /  AlkPhos  149<H>      PT/INR - ( 13 Dec 2017 00:50 )   PT: 13.0 sec;   INR: 1.17          PTT - ( 13 Dec 2017 00:50 )  PTT:29.3 sec  Urinalysis Basic - ( 13 Dec 2017 02:23 )    Color: Yellow / Appearance: Clear / S.010 / pH: x  Gluc: x / Ketone: NEGATIVE  / Bili: Negative / Urobili: 0.2 E.U./dL   Blood: x / Protein: NEGATIVE mg/dL / Nitrite: NEGATIVE   Leuk Esterase: NEGATIVE / RBC: x / WBC x   Sq Epi: x / Non Sq Epi: x / Bacteria: x        RADIOLOGY & ADDITIONAL TESTS: Reviewed. ***INCOMPLETE  INTERVAL HPI/OVERNIGHT EVENTS:    VITAL SIGNS:  T(F): 96.9 (17 @ 06:04)  HR: 72 (17 @ 04:55)  BP: 115/60 (17 @ 04:55)  RR: 18 (17 @ 04:55)  SpO2: 99% (17 @ 04:55)  Wt(kg): --    PHYSICAL EXAM:    Constitutional: Well developed, well nourished  General: laying comfortably  Eyes: PERRL  ENMT: moist mucous membranes, no mucosal pallor, clear throat, uvula midline  Neck: supple  Respiratory: CTABL, no rales, no crackles, no wheezing  Cardiovascular: +S1, +S2, no murmurs, rubs or gallops, regular rate and rhythm  Gastrointestinal: abdomen soft, non distended, non tender, +BS  Extremities: no edema, no calf pain to palpation  Vascular: cap refill <3s in all extremities, radial and DP pulses 2+  Skin: no rashes    Neurologic:  Mental status: Awake, alert and oriented x3.  Recent and remote memory intact.  Naming, repetition and comprehension intact.  Attention/concentration intact.  No dysarthria, no aphasia.  Fund of knowledge appropriate.    Cranial nerves: pupils equally round and reactive to light, visual fields full, no nystagmus, extraocular muscles intact, V1 through V3 intact bilaterally and symmetric, face symmetric, hearing intact to finger rub, palate elevation symmetric, tongue was midline, sternocleidomastoid/shoulder shrug strength bilaterally 4/5.    Motor:  Normal bulk and tone, strength 4/5 in bilateral upper and lower extremities.   strength 4/5.  Rapid alternating movements intact and symmetric.   Sensation: Intact to light touch.  No neglect.   Coordination: No dysmetria on finger-to-nose.  No clumsiness.      MEDICATIONS  (STANDING):  aspirin  chewable 81 milliGRAM(s) Oral daily  atorvastatin 80 milliGRAM(s) Oral at bedtime  cyanocobalamin 500 MICROGram(s) Oral daily  docusate sodium 100 milliGRAM(s) Oral two times a day  heparin  Injectable 5000 Unit(s) SubCutaneous every 12 hours  levothyroxine 75 MICROGram(s) Oral daily  lisinopril 2.5 milliGRAM(s) Oral daily  metoprolol     tartrate 50 milliGRAM(s) Oral two times a day  pantoprazole  Injectable 40 milliGRAM(s) IV Push daily  piperacillin/tazobactam IVPB. 3.375 Gram(s) IV Intermittent every 6 hours  polyethylene glycol 3350 17 Gram(s) Oral daily    MEDICATIONS  (PRN):  acetaminophen   Tablet 650 milliGRAM(s) Oral every 6 hours PRN For Temp greater than 38 C (100.4 F)  acetaminophen   Tablet. 650 milliGRAM(s) Oral every 6 hours PRN Moderate Pain (4 - 6)  bisacodyl Suppository 10 milliGRAM(s) Rectal daily PRN Constipation      Allergies    No Known Allergies    Intolerances        LABS:                        8.3    12.6  )-----------( 321      ( 13 Dec 2017 00:50 )             25.7     12-    131<L>  |  99  |  16  ----------------------------<  123<H>  4.3   |  18<L>  |  1.02    Ca    10.0      12 Dec 2017 22:33  Phos  2.5       Mg     1.9         TPro  7.8  /  Alb  2.6<L>  /  TBili  1.0  /  DBili  0.2  /  AST  41<H>  /  ALT  27  /  AlkPhos  149<H>  12    PT/INR - ( 13 Dec 2017 00:50 )   PT: 13.0 sec;   INR: 1.17          PTT - ( 13 Dec 2017 00:50 )  PTT:29.3 sec  Urinalysis Basic - ( 13 Dec 2017 02:23 )    Color: Yellow / Appearance: Clear / S.010 / pH: x  Gluc: x / Ketone: NEGATIVE  / Bili: Negative / Urobili: 0.2 E.U./dL   Blood: x / Protein: NEGATIVE mg/dL / Nitrite: NEGATIVE   Leuk Esterase: NEGATIVE / RBC: x / WBC x   Sq Epi: x / Non Sq Epi: x / Bacteria: x        RADIOLOGY & ADDITIONAL TESTS: Reviewed. INTERVAL HPI/OVERNIGHT EVENTS: The patient had a presumed transfusion reaction overnight resulting in hypertension and fevers, but stabilized shortly after, detailed in event note. She was hypertensive to 190s-220s starting 10 minutes after blood transfusion ended at 8pm, short of breath, tachypneic, given lasix 10mg IV, labetalol 10mg IV, BP down to 120s, chest xray done after lasix, looks unchanged from before. Likely transfusion associated circulatory overload even though echo is normal. pro-BNP 12k. reported to blood bank, transfusion reaction workup sent. also spiked fever 100.9F, sent bcx x 2, ua (negative). EKG showed sinus tach with 1st degree AV block, HR improved to 80-90 later on and pt no longer sob/tachypneic.     This morning the patient has no acute complaints and feels well and has stable hemoglobin.    VITAL SIGNS:  T(F): 96.9 (17 @ 06:04)  HR: 72 (17 @ 04:55)  BP: 115/60 (17 @ 04:55)  RR: 18 (17 @ 04:55)  SpO2: 99% (17 @ 04:55)  Wt(kg): --    PHYSICAL EXAM:    Constitutional: Well developed, cachetic  General: laying comfortably  Eyes: PERRL  ENMT: moist mucous membranes, no mucosal pallor, clear throat, uvula midline  Neck: supple  Respiratory: CTABL, no rales, no crackles, no wheezing  Cardiovascular: +S1, +S2, no murmurs, rubs or gallops, regular rate and rhythm  Gastrointestinal: abdomen soft, non distended, non tender, +BS  Extremities: no edema, no calf pain to palpation  Vascular: cap refill <3s in all extremities, radial and DP pulses 2+  Skin: purpura on arms    Neurologic:  Mental status: Awake, alert and oriented x3.  comprehension intact.  Attention/concentration intact.  No dysarthria, no aphasia.  Fund of knowledge appropriate.    Cranial nerves: pupils equally round and reactive to light, visual fields full, no nystagmus, extraocular muscles intact, V1 through V3 intact bilaterally and symmetric, face symmetric, hearing intact to finger rub, palate elevation symmetric, tongue was midline, sternocleidomastoid/shoulder shrug strength bilaterally 4/5.    Motor:  Normal bulk and tone, strength 4/5 in bilateral upper and lower extremities.   strength 4/5.  Rapid alternating movements intact and symmetric.   Sensation: Intact to light touch.  No neglect.   Coordination: No dysmetria on finger-to-nose.  No clumsiness.      MEDICATIONS  (STANDING):  aspirin  chewable 81 milliGRAM(s) Oral daily  atorvastatin 80 milliGRAM(s) Oral at bedtime  cyanocobalamin 500 MICROGram(s) Oral daily  docusate sodium 100 milliGRAM(s) Oral two times a day  heparin  Injectable 5000 Unit(s) SubCutaneous every 12 hours  levothyroxine 75 MICROGram(s) Oral daily  lisinopril 2.5 milliGRAM(s) Oral daily  metoprolol     tartrate 50 milliGRAM(s) Oral two times a day  pantoprazole  Injectable 40 milliGRAM(s) IV Push daily  piperacillin/tazobactam IVPB. 3.375 Gram(s) IV Intermittent every 6 hours  polyethylene glycol 3350 17 Gram(s) Oral daily    MEDICATIONS  (PRN):  acetaminophen   Tablet 650 milliGRAM(s) Oral every 6 hours PRN For Temp greater than 38 C (100.4 F)  acetaminophen   Tablet. 650 milliGRAM(s) Oral every 6 hours PRN Moderate Pain (4 - 6)  bisacodyl Suppository 10 milliGRAM(s) Rectal daily PRN Constipation      Allergies    No Known Allergies    Intolerances        LABS:                        8.3    12.6  )-----------( 321      ( 13 Dec 2017 00:50 )             25.7     12-12    131<L>  |  99  |  16  ----------------------------<  123<H>  4.3   |  18<L>  |  1.02    Ca    10.0      12 Dec 2017 22:33  Phos  2.5     12-12  Mg     1.9     12-12    TPro  7.8  /  Alb  2.6<L>  /  TBili  1.0  /  DBili  0.2  /  AST  41<H>  /  ALT  27  /  AlkPhos  149<H>  12-12    PT/INR - ( 13 Dec 2017 00:50 )   PT: 13.0 sec;   INR: 1.17          PTT - ( 13 Dec 2017 00:50 )  PTT:29.3 sec  Urinalysis Basic - ( 13 Dec 2017 02:23 )    Color: Yellow / Appearance: Clear / S.010 / pH: x  Gluc: x / Ketone: NEGATIVE  / Bili: Negative / Urobili: 0.2 E.U./dL   Blood: x / Protein: NEGATIVE mg/dL / Nitrite: NEGATIVE   Leuk Esterase: NEGATIVE / RBC: x / WBC x   Sq Epi: x / Non Sq Epi: x / Bacteria: x        RADIOLOGY & ADDITIONAL TESTS: Reviewed. INTERVAL HPI/OVERNIGHT EVENTS: The patient had a presumed transfusion reaction overnight resulting in hypertension and fevers, but stabilized shortly after, detailed in event note. She was hypertensive to 190s-220s starting 10 minutes after blood transfusion ended at 8pm, short of breath, tachypneic, given lasix 10mg IV, labetalol 10mg IV, BP down to 120s, chest xray done after lasix, looks unchanged from before. Likely transfusion associated circulatory overload even though echo is normal. pro-BNP 12k. reported to blood bank, transfusion reaction workup sent. also spiked fever 100.9F, sent bcx x 2, ua (negative). EKG showed sinus tach with 1st degree AV block, HR improved to 80-90 later on and pt no longer sob/tachypneic.   - Responded appropriately to the blood transfusion.    This morning the patient has no acute complaints and feels well and has stable hemoglobin.  No atrial fibrillation on cardiac monitoring.    VITAL SIGNS:  T(F): 96.9 (17 @ 06:04)  HR: 72 (17 @ 04:55)  BP: 115/60 (17 @ 04:55)  RR: 18 (17 @ 04:55)  SpO2: 99% (17 @ 04:55)    PHYSICAL EXAM:    Constitutional: lying in bed, cachetic, NAD  Eyes: anicteric  ENMT: moist mucous membranes, no mucosal pallor, clear throat  Neck: supple  Respiratory: CTA B/L, no rales, no crackles, no wheezing  Cardiovascular: +S1, +S2, no murmurs, rubs or gallops, regular rate and rhythm  Gastrointestinal: abdomen soft, non distended, non tender, +BS  Extremities: no edema, no calf pain to palpation  Vascular: cap refill <3s in all extremities, radial and DP pulses 2+  Skin: purpura on arms; skin changes on anterior surface of both shins    Neurologic:  Mental status: Awake, alert and oriented x2+. able respond to questions but not necessarily appropriate affect, mild dysarthria,  comprehension intact.  Attention/concentration intact.  defers to family for history     Cranial nerves: pupils equally round and reactive to light, visual fields full, no nystagmus, extraocular muscles intact, V1 through V3 intact bilaterally and symmetric, face symmetric, palate elevation symmetric, tongue was midline, sternocleidomastoid/shoulder shrug strength bilaterally 4/5.    Motor:  Normal bulk and tone, strength was symmetrical; 4/5 in bilateral upper and lower extremities.   strength 4/5.  Sensation: Intact to light touch.  No neglect.   Coordination: No dysmetria on finger-to-nose bilaterally  Gait: deferred    MEDICATIONS  (STANDING):  aspirin  chewable 81 milliGRAM(s) Oral daily  atorvastatin 80 milliGRAM(s) Oral at bedtime  cyanocobalamin 500 MICROGram(s) Oral daily  docusate sodium 100 milliGRAM(s) Oral two times a day  heparin  Injectable 5000 Unit(s) SubCutaneous every 12 hours  levothyroxine 75 MICROGram(s) Oral daily  lisinopril 2.5 milliGRAM(s) Oral daily  metoprolol     tartrate 50 milliGRAM(s) Oral two times a day  pantoprazole  Injectable 40 milliGRAM(s) IV Push daily  piperacillin/tazobactam IVPB. 3.375 Gram(s) IV Intermittent every 6 hours  polyethylene glycol 3350 17 Gram(s) Oral daily    MEDICATIONS  (PRN):  acetaminophen   Tablet 650 milliGRAM(s) Oral every 6 hours PRN For Temp greater than 38 C (100.4 F)  acetaminophen   Tablet. 650 milliGRAM(s) Oral every 6 hours PRN Moderate Pain (4 - 6)  bisacodyl Suppository 10 milliGRAM(s) Rectal daily PRN Constipation      Allergies  No Known Allergies    LABS:                        8.3    12.6  )-----------( 321      ( 13 Dec 2017 00:50 )             25.7     12-12    131<L>  |  99  |  16  ----------------------------<  123<H>  4.3   |  18<L>  |  1.02    Ca    10.0      12 Dec 2017 22:33  Phos  2.5     12-12  Mg     1.9     12-12    TPro  7.8  /  Alb  2.6<L>  /  TBili  1.0  /  DBili  0.2  /  AST  41<H>  /  ALT  27  /  AlkPhos  149<H>  12-12    PT/INR - ( 13 Dec 2017 00:50 )   PT: 13.0 sec;   INR: 1.17     PTT - ( 13 Dec 2017 00:50 )  PTT:29.3 sec    Iron with Total Binding Capacity in AM (12.13.17 @ 07:33)    Iron - Total Binding Capacity.: 115 ug/dL    % Saturation, Iron: 26 %    Iron Total, Serum: 30 ug/dL    Unsaturated Iron Binding Capacity: 85 ug/dL    Urinalysis Basic - ( 13 Dec 2017 02:23 )    Color: Yellow / Appearance: Clear / S.010 / pH: x  Gluc: x / Ketone: NEGATIVE  / Bili: Negative / Urobili: 0.2 E.U./dL   Blood: x / Protein: NEGATIVE mg/dL / Nitrite: NEGATIVE   Leuk Esterase: NEGATIVE / RBC: x / WBC x   Sq Epi: x / Non Sq Epi: x / Bacteria: x    RADIOLOGY & ADDITIONAL TESTS: Reviewed.

## 2017-12-13 NOTE — DIETITIAN INITIAL EVALUATION ADULT. - NS AS NUTRI INTERV MEALS SNACK
Texture-modified diet/Other (specify)/change diet to Soft, DASH/TLC;  continue current supplement Ensure x3/day. Continue mealtime assistance and encouragement.

## 2017-12-13 NOTE — DISCHARGE NOTE ADULT - PROVIDER TOKENS
TOKEN:'[id number]:'[id number]' TOKEN:'[id number]:'[id number]:'[id number]'

## 2017-12-13 NOTE — PROGRESS NOTE ADULT - PROBLEM SELECTOR PLAN 7
c/w home metoprolol 50mg BID c/w home metoprolol 50mg BID  -added lisinopril 2.5mg patient should continue and then follow up for reassessment with her PMD

## 2017-12-13 NOTE — PROGRESS NOTE ADULT - PROBLEM SELECTOR PLAN 2
Had Temp 101F rectally two nights ago. CXR with worsening infiltrate  - Started on vanc/zosyn daily - will need to evaluate how many days of antibiotics  - BCx, UCx NGTD, RVP negative Had Temp 101F rectally two nights ago. CXR with worsening infiltrate  - Started on vanc/zosyn daily - plan for 7 days total of antibiotics, will be d.paramjit on renally dosed levaquin  - BCx, UCx NGTD, RVP negative Had Temp 101F rectally two nights ago. CXR with worsening infiltrate  - Started on vanc/zosyn daily - plan for 7 days total of antibiotics, will be d/paramjit on renally dosed levaquin  - BCx, UCx NGTD, RVP negative

## 2017-12-13 NOTE — PROGRESS NOTE ADULT - SUBJECTIVE AND OBJECTIVE BOX
· Subjective and Objective: 	  HOSPITAL COURSE:  89F PMH breast cancer, AAA repair, GERD, HTN, anxiety, recent admission for sepsis 2/2 aspiration pneumonia, found to have hypercalcemia and lytic bone lesion w/ associated suspicious right lung mass(s/p bx 11/29) who presents w/ slurred speech. Pt outside the window for tPA.  Symptoms resolved on presentation NIHSS 0. 10-system ROS negative except for left rib pain  In the ED VS(T:99.1F HR:91 BP:117/72 RR:18 O2%:94 on RA ). Labs significant for WBC 14.8, H/H 8.2/25, Na 128, Ca 11.8(qing), UA w/ WBC, LE, bacteria. CTH revealed bony mets and lacunar infarcts, MRI revealed bony mets and multiple acute/subacute infarcts consistent with embolic phenomenon v. hypercoagulable state.  MRA negative.  Admitted to  for acute stroke.     ROS  CV: Denies chest pain, palpitations  RESP: Denies SOB  GI: Denies abdominal pain, constipation, diarrhea, nausea, vomiting  : Denies dysuria, hematuria, flank or back pain  ID: Denies fevers, chills  MSK: Denies joint pain   DERM: Denies any rashes, bruising, pruritis  NEURO: No headaches, blurry vision, double vision  ENDO: Denies heat or cold intolerances, changes in weight, thinning of hair  PSYCH: Denies any mood changes    VITAL SIGNS:  Vital Signs Last 24 Hrs  T(C): 36.1 (13 Dec 2017 06:04), Max: 38.3 (12 Dec 2017 22:00)  T(F): 96.9 (13 Dec 2017 06:04), Max: 100.9 (12 Dec 2017 22:00)  HR: 70 (13 Dec 2017 14:00) (62 - 121)  BP: 166/79 (13 Dec 2017 14:00) (115/60 - 222/118)  BP(mean): 111 (13 Dec 2017 11:32) (80 - 114)  RR: 14 (13 Dec 2017 14:00) (14 - 18)  SpO2: 97% (13 Dec 2017 11:32) (97% - 100%)    PHYSICAL EXAM:  Constitutional: WDWN, NAD, resting comfortably   Head: NC/AT  Eyes: PERRL, EOMI, anicteric sclera, no mucosal pallor  ENT: no nasal discharge; uvula midline, no oropharyngeal erythema or exudates; MMM  Neck: supple; no JVD or thyromegaly, no lymphadenopathy  Respiratory: CTA B/L; no W/R/R, no retractions  Cardiac: +S1/S2; RRR; no M/R/G; PMI non-displaced  Gastrointestinal: abdomen soft, NT/ND; no rebound or guarding; +BSx4  Back: spine midline, no bony tenderness or step-offs; no CVAT B/L  Extremities: warm; no calf tenderness, no pedal edema, no cyanosis, no clubbing  Musculoskeletal: NROM x4; no joint swelling, tenderness or erythema  Vascular: 2+ radial, femoral; DP/PT pulses B/L  Dermatologic: no rash, no petechia   Lymphatic: no submandibular or cervical LAD  Neurologic: AAOx3; CNII-XII grossly intact; 5/5 strength throughout, sensory symmetrically intact in B/L LE   Psychiatric: pleasant and conversive; affect and characteristics of appearance, verbalizations, behaviors are appropriate    MEDICATIONS  (STANDING):  aspirin  chewable 81 milliGRAM(s) Oral daily  atorvastatin 80 milliGRAM(s) Oral at bedtime  cyanocobalamin 500 MICROGram(s) Oral daily  levothyroxine 75 MICROGram(s) Oral daily  metoprolol     tartrate 50 milliGRAM(s) Oral two times a day  pantoprazole  Injectable 40 milliGRAM(s) IV Push daily    Allergies  No Known Allergies    LABS:                        8.0    10.9  )-----------( 258      ( 13 Dec 2017 07:33 )             24.6     130<L>  |  98  |  16  ----------------------------<  126<H>  3.5   |  21<L>  |  1.03    Ca    9.8      13 Dec 2017 07:33  Phos  3.5     12-13  Mg     1.7     12-13  TPro  7.8  /  Alb  2.6<L>  /  TBili  1.0  /  DBili  0.2  /  AST  41<H>  /  ALT  27  /  AlkPhos  149<H>  12-12    RADIOLOGY & ADDITIONAL TESTS:  Culture - Blood (collected 12-08-17 @ 15:14)  Source: .Blood Blood-Peripheral  Preliminary Report (12-09-17 @ 04:01):  No growth at 12 hours    Culture - Blood (collected 12-08-17 @ 15:13)  Source: .Blood Blood-Peripheral  Preliminary Report (12-09-17 @ 04:01):  No growth at 12 hours      Assessment and Plan:   · Assessment		  89F PMH breast cancer, AAA repair, GERD, HTN, anxiety, recent admission for sepsis 2/2 aspiration pneumonia, found to have hypercalcemia and lytic bone lesion w/ associated suspicious right lung mass(s/p bx 11/29) who presents w/ slurred speech noted this morning.       Problem/Plan - 1:  ·  Problem: CVA (cerebral vascular accident).  Plan: Slurred speech and lower extremity weakness this AM; last known normal last night (12/08/17) @ 7pm. 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 Patient only on simvastatin 10mg at home. Symptoms resolved while in ED. MRA H/N without occlusion   - NIHSS 0  - s/p  and Lipitor 80mg in ED. Will c/w ASA 81mg daily & Lipitor 80mg qhs  - echocardiogram w/ bubble showed intraatrial shunt. Will consult cards for possible closure of PFO Vs ASD  -LILY for further workup of cardioembolic etiology  -LE duplex for DVT rule out  - PT/OT  - stroke education  - DVT ppx   - telemetry monitoring   - passed bedside dysphagia, started on diet  -ASA/ statin.     Problem/Plan - 2:  ·  Problem: Hypercalcemia.  Plan: recent hospitalization for hypercalcemia in setting of malignancy. 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 Ca normal this admission.    -recent labs show low PTH, low PTHrP  -ionized calcium  -no changes on telemetry/EKG  -monitor electrolytes, IVF hydration     # Anemia: Tx PRBCs to keep Hb > 7 g/dl  40 mg IV protonix started.      Problem/Plan - 3:  ·  Problem: Lung mass.  Plan: as per above  -Will consult palliative for goals of care.     Problem/Plan - 4:  ·  Problem: Fever.  Plan: discharged on Nov 17th w/ diagnosis of CAP s/p treatment  presents to ED w/ fever 100.4, 2/4SIRS without lactate. No clear source of infection. PNA possible however CXR with improvement of effusions and infiltrate since last admission. UA not clean catch. s/p Vancomycin and Zosyn in ED  - will hold off on abx   - repeat CXR in AM  - Leukocytosis resolved on CBC  - f/u blood and urine cultures.      Problem/Plan - 5:  ·  Problem: Squamous cell carcinoma.  Plan: lytic lesion on 6th rib not suggestive of breast CA mets, rather new malignancy.   - will further treatment options with patient and family     Problem/Plan - 6:  Problem: AAA (abdominal aortic aneurysm). Plan: BP control  stable.     Problem/Plan - 7:  ·  Problem: HTN (hypertension).  Plan: c/w home metoprolol 50mg BID.      Problem/Plan - 8:  ·  Problem: GERD (gastroesophageal reflux disease).  Plan: famotidine 20mg daily.      Problem/Plan - 9:  ·  Problem: Need for prophylactic measure.  Plan: HSQ, SCD's.      Problem/Plan - 10:  Problem: Nutrition, metabolism, and development symptoms. Plan; NPO until passed bedside dysphagia   monitor/ replete electrolytes K>4, Mg>2   IVF @ 75cc/hr     DNR/DNI

## 2017-12-19 PROCEDURE — 84443 ASSAY THYROID STIM HORMONE: CPT

## 2017-12-19 PROCEDURE — 92611 MOTION FLUOROSCOPY/SWALLOW: CPT | Mod: GN

## 2017-12-19 PROCEDURE — 96374 THER/PROPH/DIAG INJ IV PUSH: CPT

## 2017-12-19 PROCEDURE — 83519 RIA NONANTIBODY: CPT

## 2017-12-19 PROCEDURE — 93005 ELECTROCARDIOGRAM TRACING: CPT

## 2017-12-19 PROCEDURE — 85027 COMPLETE CBC AUTOMATED: CPT

## 2017-12-19 PROCEDURE — 87633 RESP VIRUS 12-25 TARGETS: CPT

## 2017-12-19 PROCEDURE — 80048 BASIC METABOLIC PNL TOTAL CA: CPT

## 2017-12-19 PROCEDURE — 96375 TX/PRO/DX INJ NEW DRUG ADDON: CPT

## 2017-12-19 PROCEDURE — 87086 URINE CULTURE/COLONY COUNT: CPT

## 2017-12-19 PROCEDURE — 85730 THROMBOPLASTIN TIME PARTIAL: CPT

## 2017-12-19 PROCEDURE — 70450 CT HEAD/BRAIN W/O DYE: CPT

## 2017-12-19 PROCEDURE — 74230 X-RAY XM SWLNG FUNCJ C+: CPT

## 2017-12-19 PROCEDURE — 82040 ASSAY OF SERUM ALBUMIN: CPT

## 2017-12-19 PROCEDURE — 85610 PROTHROMBIN TIME: CPT

## 2017-12-19 PROCEDURE — 83970 ASSAY OF PARATHORMONE: CPT

## 2017-12-19 PROCEDURE — 86334 IMMUNOFIX E-PHORESIS SERUM: CPT

## 2017-12-19 PROCEDURE — 81001 URINALYSIS AUTO W/SCOPE: CPT

## 2017-12-19 PROCEDURE — 97116 GAIT TRAINING THERAPY: CPT

## 2017-12-19 PROCEDURE — 71045 X-RAY EXAM CHEST 1 VIEW: CPT

## 2017-12-19 PROCEDURE — 80053 COMPREHEN METABOLIC PANEL: CPT

## 2017-12-19 PROCEDURE — 85025 COMPLETE CBC W/AUTO DIFF WBC: CPT

## 2017-12-19 PROCEDURE — 87186 SC STD MICRODIL/AGAR DIL: CPT

## 2017-12-19 PROCEDURE — 87798 DETECT AGENT NOS DNA AMP: CPT

## 2017-12-19 PROCEDURE — 99285 EMERGENCY DEPT VISIT HI MDM: CPT | Mod: 25

## 2017-12-19 PROCEDURE — 83550 IRON BINDING TEST: CPT

## 2017-12-19 PROCEDURE — 82310 ASSAY OF CALCIUM: CPT

## 2017-12-19 PROCEDURE — 36415 COLL VENOUS BLD VENIPUNCTURE: CPT

## 2017-12-19 PROCEDURE — 83735 ASSAY OF MAGNESIUM: CPT

## 2017-12-19 PROCEDURE — 97161 PT EVAL LOW COMPLEX 20 MIN: CPT

## 2017-12-19 PROCEDURE — 84165 PROTEIN E-PHORESIS SERUM: CPT

## 2017-12-19 PROCEDURE — 87040 BLOOD CULTURE FOR BACTERIA: CPT

## 2017-12-19 PROCEDURE — 87581 M.PNEUMON DNA AMP PROBE: CPT

## 2017-12-19 PROCEDURE — 83605 ASSAY OF LACTIC ACID: CPT

## 2017-12-19 PROCEDURE — 82728 ASSAY OF FERRITIN: CPT

## 2017-12-19 PROCEDURE — 84155 ASSAY OF PROTEIN SERUM: CPT

## 2017-12-19 PROCEDURE — 87486 CHLMYD PNEUM DNA AMP PROBE: CPT

## 2017-12-19 PROCEDURE — 92610 EVALUATE SWALLOWING FUNCTION: CPT | Mod: GN

## 2017-12-22 DIAGNOSIS — G47.00 INSOMNIA, UNSPECIFIED: ICD-10-CM

## 2017-12-22 DIAGNOSIS — Z85.3 PERSONAL HISTORY OF MALIGNANT NEOPLASM OF BREAST: ICD-10-CM

## 2017-12-22 DIAGNOSIS — I63.40 CEREBRAL INFARCTION DUE TO EMBOLISM OF UNSPECIFIED CEREBRAL ARTERY: ICD-10-CM

## 2017-12-22 DIAGNOSIS — R29.898 OTHER SYMPTOMS AND SIGNS INVOLVING THE MUSCULOSKELETAL SYSTEM: ICD-10-CM

## 2017-12-22 DIAGNOSIS — Z87.891 PERSONAL HISTORY OF NICOTINE DEPENDENCE: ICD-10-CM

## 2017-12-22 DIAGNOSIS — I10 ESSENTIAL (PRIMARY) HYPERTENSION: ICD-10-CM

## 2017-12-22 DIAGNOSIS — R26.89 OTHER ABNORMALITIES OF GAIT AND MOBILITY: ICD-10-CM

## 2017-12-22 DIAGNOSIS — C79.51 SECONDARY MALIGNANT NEOPLASM OF BONE: ICD-10-CM

## 2017-12-22 DIAGNOSIS — R00.0 TACHYCARDIA, UNSPECIFIED: ICD-10-CM

## 2017-12-22 DIAGNOSIS — E87.71 TRANSFUSION ASSOCIATED CIRCULATORY OVERLOAD: ICD-10-CM

## 2017-12-22 DIAGNOSIS — K59.00 CONSTIPATION, UNSPECIFIED: ICD-10-CM

## 2017-12-22 DIAGNOSIS — I44.0 ATRIOVENTRICULAR BLOCK, FIRST DEGREE: ICD-10-CM

## 2017-12-22 DIAGNOSIS — R50.84 FEBRILE NONHEMOLYTIC TRANSFUSION REACTION: ICD-10-CM

## 2017-12-22 DIAGNOSIS — R29.700 NIHSS SCORE 0: ICD-10-CM

## 2017-12-22 DIAGNOSIS — K21.9 GASTRO-ESOPHAGEAL REFLUX DISEASE WITHOUT ESOPHAGITIS: ICD-10-CM
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
## 2017-12-22 DIAGNOSIS — J18.9 PNEUMONIA, UNSPECIFIED ORGANISM: ICD-10-CM

## 2017-12-22 DIAGNOSIS — E87.1 HYPO-OSMOLALITY AND HYPONATREMIA: ICD-10-CM

## 2017-12-22 DIAGNOSIS — C34.90 MALIGNANT NEOPLASM OF UNSPECIFIED PART OF UNSPECIFIED BRONCHUS OR LUNG: ICD-10-CM

## 2017-12-22 DIAGNOSIS — Q21.1 ATRIAL SEPTAL DEFECT: ICD-10-CM

## 2017-12-22 DIAGNOSIS — Z66 DO NOT RESUSCITATE: ICD-10-CM

## 2017-12-22 DIAGNOSIS — F41.9 ANXIETY DISORDER, UNSPECIFIED: ICD-10-CM

## 2017-12-22 DIAGNOSIS — Z51.5 ENCOUNTER FOR PALLIATIVE CARE: ICD-10-CM

## 2017-12-22 DIAGNOSIS — E03.9 HYPOTHYROIDISM, UNSPECIFIED: ICD-10-CM

## 2017-12-22 DIAGNOSIS — I24.8 OTHER FORMS OF ACUTE ISCHEMIC HEART DISEASE: ICD-10-CM

## 2017-12-22 DIAGNOSIS — E83.52 HYPERCALCEMIA: ICD-10-CM

## 2017-12-22 DIAGNOSIS — I16.0 HYPERTENSIVE URGENCY: ICD-10-CM

## 2017-12-22 DIAGNOSIS — D64.9 ANEMIA, UNSPECIFIED: ICD-10-CM

## 2017-12-22 DIAGNOSIS — E78.5 HYPERLIPIDEMIA, UNSPECIFIED: ICD-10-CM

## 2018-02-10 NOTE — PROGRESS NOTE ADULT - PROBLEM SELECTOR PROBLEM 6
AAA (abdominal aortic aneurysm)
Spontaneous, unlabored and symmetrical
AAA (abdominal aortic aneurysm)

## 2019-02-21 NOTE — PHYSICAL THERAPY INITIAL EVALUATION ADULT - BALANCE DISTURBANCE, IDENTIFIED IMPAIRMENT CONTRIBUTE, REHAB EVAL
impaired postural control/decreased strength Slit Excision Additional Text (Leave Blank If You Do Not Want): A linear line was drawn on the skin overlying the lesion. An incision was made slowly until the lesion was visualized.  Once visualized, the lesion was removed with blunt dissection.

## 2019-07-17 NOTE — ED ADULT NURSE NOTE - [id number]_ED_[id number]
No

## 2019-08-27 NOTE — DIETITIAN INITIAL EVALUATION ADULT. - PROBLEM SELECTOR PROBLEM 6
Can you fill out his forms showing neg for tb and let wife know forms are done and labs ok? AAA (abdominal aortic aneurysm)

## 2019-12-23 NOTE — PROGRESS NOTE ADULT - SUBJECTIVE AND OBJECTIVE BOX
I put this patient on liquid version of Carafate when she was in the office recently can we sort out why I am getting refill request for the tablets.  Was the liquid to expensive for the patient?  INTERVAL HPI/OVERNIGHT EVENTS:  Patient was seen and examined at bedside. As per nurse, 24hr aide and patient, no o/n events, patient resting comfortably. Pt  reporting vague globus-like discomfort of unclear duration. Per aide, pt more confused today than before. No other complaints at this time. Patient denies: fever, chills, HA, Changes in vision, palpitations, SOB, cough, N/V/D/C, dysuria, changes in bowel movements, LE edema.    VITAL SIGNS:  Vital Signs Last 24 Hrs  T(C): 36.9 (2017 08:49), Max: 36.9 (2017 08:49)  T(F): 98.5 (2017 08:49), Max: 98.5 (2017 08:49)  HR: 106 (2017 08:49) (99 - 106)  BP: 157/90 (2017 08:49) (134/79 - 157/90)  BP(mean): --  RR: 17 (2017 08:49) (17 - 19)  SpO2: 96% (2017 08:49) (95% - 97%)    PHYSICAL EXAM:    Constitutional: underweigh pleasant elderly lady in no acute distress  Eyes: PERRL, EOMI, sclera non-icteric  Neck: supple, trachea midline, no masses, no JVD, no bruits  Respiratory: CTA b/l today  Cardiovascular: RRR, normal S1S2, no M/R/G  Gastrointestinal: soft, NTND, no masses palpable, BS normal, no bruits  Extremities: no LE edema; no changes in LE discoloration  Neurological: AAOx3, CN Grossly intact, BARRERA  Skin: Normal temperature, warm, dry  Psych: mildly confused and nonsensical at times, but n evidence of agitation or psychosis    MEDICATIONS  (STANDING):  cyanocobalamin 500 MICROGram(s) Oral daily  famotidine    Tablet 20 milliGRAM(s) Oral daily  heparin  Injectable 5000 Unit(s) SubCutaneous every 12 hours  levoFLOXacin  Tablet 750 milliGRAM(s) Oral every 48 hours  levothyroxine 75 MICROGram(s) Oral daily  magnesium sulfate  IVPB 1 Gram(s) IV Intermittent once  potassium chloride  10 mEq/100 mL IVPB 10 milliEquivalent(s) IV Intermittent every 1 hour  sodium chloride 0.9%. 1000 milliLiter(s) (70 mL/Hr) IV Continuous <Continuous>    MEDICATIONS  (PRN):      Allergies    No Known Allergies    Intolerances        LABS:                        8.4    10.0  )-----------( 257      ( 2017 06:13 )             26.1     11-16    133<L>  |  98  |  24<H>  ----------------------------<  102<H>  3.4<L>   |  23  |  0.94    Ca    10.4      2017 06:15  Mg     1.6         TPro  7.0  /  Alb  2.3<L>  /  TBili  0.3  /  DBili  x   /  AST  20  /  ALT  20  /  AlkPhos  64  11-16    PT/INR - ( 2017 18:04 )   PT: 12.2 sec;   INR: 1.10          PTT - ( 2017 18:04 )  PTT:26.8 sec  Urinalysis Basic - ( 2017 19:55 )    Color: Yellow / Appearance: Clear / S.010 / pH: x  Gluc: x / Ketone: NEGATIVE  / Bili: Negative / Urobili: 0.2 E.U./dL   Blood: x / Protein: Trace mg/dL / Nitrite: POSITIVE   Leuk Esterase: NEGATIVE / RBC: < 5 /HPF / WBC < 5 /HPF   Sq Epi: x / Non Sq Epi: 0-5 /HPF / Bacteria: Many /HPF        RADIOLOGY & ADDITIONAL TESTS: INTERVAL HPI/OVERNIGHT EVENTS:  Patient was seen and examined at bedside. As per nurse, 24hr aide and patient, no o/n events, patient resting comfortably. Pt  reporting vague globus-like discomfort of unclear duration. Per aide, pt more confused today than before. No other complaints at this time. Patient denies: fever, chills, HA, Changes in vision, palpitations, SOB, cough, N/V/D/C, dysuria, changes in bowel movements, LE edema.    VITAL SIGNS:  Vital Signs Last 24 Hrs  T(C): 36.9 (2017 08:49), Max: 36.9 (2017 08:49)  T(F): 98.5 (2017 08:49), Max: 98.5 (2017 08:49)  HR: 106 (2017 08:49) (99 - 106)  BP: 157/90 (2017 08:49) (134/79 - 157/90)  BP(mean): --  RR: 17 (2017 08:49) (17 - 19)  SpO2: 96% (2017 08:49) (95% - 97%)    PHYSICAL EXAM:    Constitutional: underweigh pleasant elderly lady in no acute distress  Eyes: PERRL, EOMI, sclera non-icteric  Neck: supple, trachea midline, no masses, no JVD, no bruits  Respiratory: CTA b/l today  Cardiovascular: RRR, normal S1S2, no M/R/G  Gastrointestinal: soft, NTND, no masses palpable, BS normal, no bruits  Extremities: no LE edema; no changes in LE discoloration  Neurological: AAOx3, CN Grossly intact, BARRERA  Skin: Normal temperature, warm, dry  Psych: mildly confused and nonsensical at times, but n evidence of agitation or psychosis    MEDICATIONS  (STANDING):  cyanocobalamin 500 MICROGram(s) Oral daily  famotidine    Tablet 20 milliGRAM(s) Oral daily  heparin  Injectable 5000 Unit(s) SubCutaneous every 12 hours  levoFLOXacin  Tablet 750 milliGRAM(s) Oral every 48 hours  levothyroxine 75 MICROGram(s) Oral daily  magnesium sulfate  IVPB 1 Gram(s) IV Intermittent once  potassium chloride  10 mEq/100 mL IVPB 10 milliEquivalent(s) IV Intermittent every 1 hour  sodium chloride 0.9%. 1000 milliLiter(s) (70 mL/Hr) IV Continuous <Continuous>    MEDICATIONS  (PRN):      Allergies    No Known Allergies    Intolerances        LABS:                        8.4    10.0  )-----------( 257      ( 2017 06:13 )             26.1         133<L>  |  98  |  24<H>  ----------------------------<  102<H>  3.4<L>   |  23  |  0.94    Ca    10.4      2017 06:15  Mg     1.6         TPro  7.0  /  Alb  2.3<L>  /  TBili  0.3  /  DBili  x   /  AST  20  /  ALT  20  /  AlkPhos  64      PT/INR - ( 2017 18:04 )   PT: 12.2 sec;   INR: 1.10          PTT - ( 2017 18:04 )  PTT:26.8 sec  Urinalysis Basic - ( 2017 19:55 )    Color: Yellow / Appearance: Clear / S.010 / pH: x  Gluc: x / Ketone: NEGATIVE  / Bili: Negative / Urobili: 0.2 E.U./dL   Blood: x / Protein: Trace mg/dL / Nitrite: POSITIVE   Leuk Esterase: NEGATIVE / RBC: < 5 /HPF / WBC < 5 /HPF   Sq Epi: x / Non Sq Epi: 0-5 /HPF / Bacteria: Many /HPF        RADIOLOGY & ADDITIONAL TESTS:    CXR :  Persistent obscuration of the left hemidiaphragm and costophrenic angle,   as described on the prior chest radiograph.    Expansile lytic lesion involving the right lateral sixth rib. Findings   concerning for an aggressive lesion, possibly a metastatic focus.      Further evaluation with PET/CT scan and/or repeat CT chest with IV   contrast is recommended.

## 2020-03-16 NOTE — PATIENT PROFILE ADULT. - AS SC BRADEN SENSORY
No pertinent past medical history <<----- Click to add NO pertinent Past Medical History (3) slightly limited

## 2020-06-01 NOTE — PATIENT PROFILE ADULT. - EXTENSIONS OF SELF_ADULT
Eyeglasses/Hearing Aid
Normal vision: sees adequately in most situations; can see medication labels, newsprint

## 2020-08-20 NOTE — DISCHARGE NOTE ADULT - FUNCTIONAL STATUS DATE
KRISTIN MENENDEZEL  [id number]    PROCEDURE:  s/p successful implant of dual chamber pacemaker (Biotronik)      INDICATION:  sinus node dysfunction      ELECTROPHYSIOLOGIST(S):  Dr. Murcia      ANESTHESIOLOGY:  MAC      FINDINGS:  sinus node dysfunction      COMPLICATIONS:  none      RECOMMENDATIONS:  - CXR PA/LAt. today before discharge  - patient will send remote transmission tomorrow
13-Dec-2017

## 2020-10-20 NOTE — PHYSICAL THERAPY INITIAL EVALUATION ADULT - LEVEL OF CONSCIOUSNESS, REHAB EVAL
Detail Level: Detailed Topical Clindamycin Pregnancy And Lactation Text: This medication is Pregnancy Category B and is considered safe during pregnancy. It is unknown if it is excreted in breast milk. Tetracycline Pregnancy And Lactation Text: This medication is Pregnancy Category D and not consider safe during pregnancy. It is also excreted in breast milk. Patient Specific Counseling (Will Not Stick From Patient To Patient): 10/20/20\\Breonnarobyn has controlled her psoriasis well. She has graduated from nursing school. Her x  now no longer covers her insurance.... is between jobs. 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  Had her sign paperwork so as soon as we can, will get her an Rx for otezla. Topical Clindamycin Counseling: Patient counseled that this medication may cause skin irritation or allergic reactions.  In the event of skin irritation, the patient was advised to reduce the amount of the drug applied or use it less frequently.   The patient verbalized understanding of the proper use and possible adverse effects of clindamycin.  All of the patient's questions and concerns were addressed. Bactrim Pregnancy And Lactation Text: This medication is Pregnancy Category D and is known to cause fetal risk.  It is also excreted in breast milk. Topical Retinoid Pregnancy And Lactation Text: This medication is Pregnancy Category C. It is unknown if this medication is excreted in breast milk. Spironolactone Pregnancy And Lactation Text: This medication can cause feminization of the male fetus and should be avoided during pregnancy. The active metabolite is also found in breast milk. Detail Level: Generalized High Dose Vitamin A Counseling: Side effects reviewed, pt to contact office should one occur. Use Enhanced Medication Counseling?: No Benzoyl Peroxide Counseling: Patient counseled that medicine may cause skin irritation and bleach clothing.  In the event of skin irritation, the patient was advised to reduce the amount of the drug applied or use it less frequently.   The patient verbalized understanding of the proper use and possible adverse effects of benzoyl peroxide.  All of the patient's questions and concerns were addressed. Isotretinoin Pregnancy And Lactation Text: This medication is Pregnancy Category X and is considered extremely dangerous during pregnancy. It is unknown if it is excreted in breast milk. Spironolactone Counseling: Patient advised regarding risks of diarrhea, abdominal pain, hyperkalemia, birth defects (for female patients), liver toxicity and renal toxicity. The patient may need blood work to monitor liver and kidney function and potassium levels while on therapy. The patient verbalized understanding of the proper use and possible adverse effects of spironolactone.  All of the patient's questions and concerns were addressed. Bactrim Counseling:  I discussed with the patient the risks of sulfa antibiotics including but not limited to GI upset, allergic reaction, drug rash, diarrhea, dizziness, photosensitivity, and yeast infections.  Rarely, more serious reactions can occur including but not limited to aplastic anemia, agranulocytosis, methemoglobinemia, blood dyscrasias, liver or kidney failure, lung infiltrates or desquamative/blistering drug rashes. Minocycline Counseling: Patient advised regarding possible photosensitivity and discoloration of the teeth, skin, lips, tongue and gums.  Patient instructed to avoid sunlight, if possible.  When exposed to sunlight, patients should wear protective clothing, sunglasses, and sunscreen.  The patient was instructed to call the office immediately if the following severe adverse effects occur:  hearing changes, easy bruising/bleeding, severe headache, or vision changes.  The patient verbalized understanding of the proper use and possible adverse effects of minocycline.  All of the patient's questions and concerns were addressed. Tetracycline Counseling: Patient counseled regarding possible photosensitivity and increased risk for sunburn.  Patient instructed to avoid sunlight, if possible.  When exposed to sunlight, patients should wear protective clothing, sunglasses, and sunscreen.  The patient was instructed to call the office immediately if the following severe adverse effects occur:  hearing changes, easy bruising/bleeding, severe headache, or vision changes.  The patient verbalized understanding of the proper use and possible adverse effects of tetracycline.  All of the patient's questions and concerns were addressed. Patient understands to avoid pregnancy while on therapy due to potential birth defects. Azithromycin Counseling:  I discussed with the patient the risks of azithromycin including but not limited to GI upset, allergic reaction, drug rash, diarrhea, and yeast infections. Doxycycline Pregnancy And Lactation Text: This medication is Pregnancy Category D and not consider safe during pregnancy. It is also excreted in breast milk but is considered safe for shorter treatment courses. Erythromycin Pregnancy And Lactation Text: This medication is Pregnancy Category B and is considered safe during pregnancy. It is also excreted in breast milk. Doxycycline Counseling:  Patient counseled regarding possible photosensitivity and increased risk for sunburn.  Patient instructed to avoid sunlight, if possible.  When exposed to sunlight, patients should wear protective clothing, sunglasses, and sunscreen.  The patient was instructed to call the office immediately if the following severe adverse effects occur:  hearing changes, easy bruising/bleeding, severe headache, or vision changes.  The patient verbalized understanding of the proper use and possible adverse effects of doxycycline.  All of the patient's questions and concerns were addressed. Erythromycin Counseling:  I discussed with the patient the risks of erythromycin including but not limited to GI upset, allergic reaction, drug rash, diarrhea, increase in liver enzymes, and yeast infections. Patient Specific Counseling (Will Not Stick From Patient To Patient): Patient has been getting severe headaches for about a week while she is on the non-hormone week of her BCP [orthotricycline lo]  She has had a hysterectomy so the reason she is on BCP is to control her acne. With BCP and spironolactone she still gets a few pimples during the non-hormone phase of the BCP.\\n  Will increase her spironolactone to 150mg qd....if in 2 mo her acne has gone and her headaches are still present, stop the BCP. Birth Control Pills Counseling: Birth Control Pill Counseling: I discussed with the patient the potential side effects of OCPs including but not limited to increased risk of stroke, heart attack, thrombophlebitis, deep venous thrombosis, hepatic adenomas, breast changes, GI upset, headaches, and depression.  The patient verbalized understanding of the proper use and possible adverse effects of OCPs. All of the patient's questions and concerns were addressed. Tazorac Pregnancy And Lactation Text: This medication is not safe during pregnancy. It is unknown if this medication is excreted in breast milk. Dapsone Pregnancy And Lactation Text: This medication is Pregnancy Category C and is not considered safe during pregnancy or breast feeding. High Dose Vitamin A Pregnancy And Lactation Text: High dose vitamin A therapy is contraindicated during pregnancy and breast feeding. Topical Sulfur Applications Pregnancy And Lactation Text: This medication is Pregnancy Category C and has an unknown safety profile during pregnancy. It is unknown if this topical medication is excreted in breast milk. Dapsone Counseling: I discussed with the patient the risks of dapsone including but not limited to hemolytic anemia, agranulocytosis, rashes, methemoglobinemia, kidney failure, peripheral neuropathy, headaches, GI upset, and liver toxicity.  Patients who start dapsone require monitoring including baseline LFTs and weekly CBCs for the first month, then every month thereafter.  The patient verbalized understanding of the proper use and possible adverse effects of dapsone.  All of the patient's questions and concerns were addressed. Benzoyl Peroxide Pregnancy And Lactation Text: This medication is Pregnancy Category C. It is unknown if benzoyl peroxide is excreted in breast milk. Topical Sulfur Applications Counseling: Topical Sulfur Counseling: Patient counseled that this medication may cause skin irritation or allergic reactions.  In the event of skin irritation, the patient was advised to reduce the amount of the drug applied or use it less frequently.   The patient verbalized understanding of the proper use and possible adverse effects of topical sulfur application.  All of the patient's questions and concerns were addressed. Topical Retinoid counseling:  Patient advised to apply a pea-sized amount only at bedtime and wait 30 minutes after washing their face before applying.  If too drying, patient may add a non-comedogenic moisturizer. The patient verbalized understanding of the proper use and possible adverse effects of retinoids.  All of the patient's questions and concerns were addressed. Birth Control Pills Pregnancy And Lactation Text: This medication should be avoided if pregnant and for the first 30 days post-partum. Isotretinoin Counseling: Patient should get monthly blood tests, not donate blood, not drive at night if vision affected, not share medication, and not undergo elective surgery for 6 months after tx completed. Side effects reviewed, pt to contact office should one occur. Tazorac Counseling:  Patient advised that medication is irritating and drying.  Patient may need to apply sparingly and wash off after an hour before eventually leaving it on overnight.  The patient verbalized understanding of the proper use and possible adverse effects of tazorac.  All of the patient's questions and concerns were addressed. Azithromycin Pregnancy And Lactation Text: This medication is considered safe during pregnancy and is also secreted in breast milk. alert/confused

## 2020-12-14 NOTE — ED ADULT TRIAGE NOTE - PAIN: PRESENCE, MLM
The patient was transferred from [facility] for coffee ground emesis, melena and trach drainage. The patient has CP and non-verbal. His mother is with him but has no details of current symptoms. The patient had a colostomy revision 12/08/20 by Dr. Ortez. He was discharged with a Hgb of 10.4 after transfusion. His baseline is around 9. On this admit, it was 8.6. He received a unit of blood and repeat Hgb pending. The patient also had Dynamical ST segment elevation on EKG. Cardiology was consulted and felt it was coronary spasm vs artifact caused by tachycardia. The patient's ostomy output is currently brown. PEG was flushed and non-bloody per report. The patient is also noted to have significant elevation in ALP (1,600) which is new. T. Bili and ALT are normal. AST mildly elevated. GGT 2,000. Ultrasound was unrevealing. He was started on Zyvox last admit.      complains of pain/discomfort/Back Pain

## 2021-03-10 NOTE — PROGRESS NOTE ADULT - PROBLEM SELECTOR PLAN 3
as per above as per above  -Will consult palliative for goals of care Graft Cartilage Fenestration Text: The cartilage was fenestrated with a 2mm punch biopsy to help facilitate graft survival and healing. Universal Safety Interventions

## 2021-08-29 NOTE — PROGRESS NOTE ADULT - PROBLEM SELECTOR PLAN 2
Pt last saw PCP 6/1/21 for a pre-op exam. No pending appt scheduled. Last CPE was 6/18/20.     methylpheniDATE ER (Concerta) 27 MG CR tablets last ordered 7/27/21 #30 tablets R0  Take 1 tablet by mouth every morning.    OK to refill as previous?   
Will discuss results with patient at upcoming appointment.    
PPI

## 2021-12-21 NOTE — ED ADULT NURSE NOTE - NS ED NOTE ABUSE SUSPICION NEGLECT YN
Problem: Adult Mental Health  Goal: Reports or exhibits reduction in symptoms associated with elevated or labile mood/esequiel  Outcome: Outcome Not Met, Continue to Monitor  Goal: Reports or exhibits improvement in depressive mood signs/symptoms  Outcome: Outcome Not Met, Continue to Monitor  Goal: Reports or exhibits an improvement in mood signs/symptoms associated with anxiety  Outcome: Outcome Not Met, Continue to Monitor  Goal: Demonstrates ability to proactively perform self cares  Outcome: Outcome Not Met, Continue to Monitor  Goal: Demonstrates the ability to engage in reality-based communication and refrains from acting on delusional thoughts  Outcome: Outcome Not Met, Continue to Monitor  Goal: Reports or exhibits hallucinations absent or at manageable level  Outcome: Outcome Not Met, Continue to Monitor  Goal: Demonstrates improved ability to track conversation, process information  Outcome: Outcome Not Met, Continue to Monitor  Goal: Reports decrease in thoughts of suicide  Outcome: Outcome Not Met, Continue to Monitor  Goal: Reports decrease in thoughts of violence  Outcome: Outcome Not Met, Continue to Monitor  Goal: Reports decrease in thoughts of self harm  Outcome: Outcome Not Met, Continue to Monitor  Goal: Verbalizes when symptoms of illness are triggered and reports to staff when experiencing urges/intent of suicide  Outcome: Outcome Not Met, Continue to Monitor  Goal: Verbalizes when symptoms of illness are triggered and reports to staff when experiencing urges/intent of violence  Outcome: Outcome Not Met, Continue to Monitor  Goal: Verbalizes when symptoms of illness are triggered and reports to staff when experiencing urges/intent of self harm  Outcome: Outcome Not Met, Continue to Monitor  Goal: Denies having a suicidal plan  Outcome: Outcome Not Met, Continue to Monitor  Goal: Denies having a homicidal plan  Outcome: Outcome Not Met, Continue to Monitor  Goal: Verbalizes understanding of  positive individual coping skills  Outcome: Outcome Not Met, Continue to Monitor  Goal: Demonstrates control of behavior, refraining from hostility, aggression or threats of violence  Outcome: Outcome Not Met, Continue to Monitor  Goal: Refrains from self harm behavior/cutting  Outcome: Outcome Not Met, Continue to Monitor  Goal: Verbalizes understanding of diagnosis, reason for treament and treatment program  Outcome: Outcome Not Met, Continue to Monitor  Goal: Verbalizes understanding of medication management/monitoring/assessment of effectiveness  Outcome: Outcome Not Met, Continue to Monitor  Goal: Demonstrates or reports decrease in symptoms of paranoia or delusional thoughts  Outcome: Outcome Not Met, Continue to Monitor     Problem: Depressive Symptoms  Goal: #Depressive s/s (self-reported/observed) are recognized and monitored  Outcome: Outcome Not Met, Continue to Monitor  Goal: #Verbalizes understanding of depressive symptoms management  Description: Document in Patient Education Activity  Outcome: Outcome Not Met, Continue to Monitor     Problem: Suicide, Risk for  Goal: Remains free from self-harm  Outcome: Outcome Not Met, Continue to Monitor  Goal: #Suicidal thoughts, plans, and behaviors are monitored  Description: Suicidal Thoughts  a) Passive thoughts about wanting to be dead with no plan, past history of suicide, or suicidal behavior  b) Active suicidal ideation involves an existing wish to die accompanied by a plan for how to carry out the death, or suicidal behavior.  Active suicidal ideation/suicidal behavior require intervention  Suicide Behaviors (including preparatory acts)  Acts or preparation toward making a suicide attempt, but before potential for harm has begun.  This includes behaviors such as assembling a method (e.g. buying a gun, collecting pills) or preparing for one's death by suicide (e.g. writing a suicide note, giving things away)  Outcome: Outcome Not Met, Continue to  Monitor  Goal: #Verbalizes understanding of suicide monitoring, precautions, and prevention  Description: Document in Patient Education Activity   Outcome: Outcome Not Met, Continue to Monitor     Problem: Anxiety  Goal: Anxiety is at a manageable level with interventions  Outcome: Outcome Not Met, Continue to Monitor  Goal: Anxiety is decreased  Outcome: Outcome Not Met, Continue to Monitor  Goal: Verbalizes understanding of anxiety symptoms and management  Description: Document on Patient Education Activity   Outcome: Outcome Not Met, Continue to Monitor     Problem: Coping, Ineffective  Goal: Identifies personal stressors  Outcome: Outcome Not Met, Continue to Monitor  Goal: Identifies potential coping strategies  Outcome: Outcome Not Met, Continue to Monitor  Goal: Identifies benefits and consequences of choices/behavior(s)  (Patient)  Outcome: Outcome Not Met, Continue to Monitor  Goal: Identifies benefits and consequences of choices/behavior(s)  (Family)  Outcome: Outcome Not Met, Continue to Monitor  Goal: Identifies personal or community-based support systems (Patient)  Outcome: Outcome Not Met, Continue to Monitor  Goal: Identifies personal or community-based support systems (Family)  Outcome: Outcome Not Met, Continue to Monitor  Goal: Verbalizes understanding of grief process (if appropriate)  Description: Document education using the patient education activity.   Outcome: Outcome Not Met, Continue to Monitor  Goal: Verbalizes understanding of stress reaction/coping  Description: Document education using the patient education activity.   Outcome: Outcome Not Met, Continue to Monitor      No

## 2023-07-25 NOTE — DIETITIAN INITIAL EVALUATION ADULT. - PROBLEM SELECTOR PROBLEM 7
Benefits, risks, and possible complications of procedure explained to patient/caregiver who verbalized understanding and gave written consent.
HTN (hypertension)

## 2023-09-12 NOTE — DISCHARGE NOTE ADULT - REASON FOR ADMISSION
Per Dr العراقي - pt needs to take metoprolol tart 25 mg PM and AM prior to the CTA.   decreased PO intake and generalized weakness

## 2023-10-26 NOTE — ED ADULT NURSE NOTE - BREATHING, MLM
Occupational Therapy    Visit Type: treatment  SUBJECTIVE  Patient agreed to participate in therapy this date.  Patient's family in agreement to work with patient for therapy session.  Patient sitting up in recliner. DaughterEleonora, in room. Agreeable to therapy working with patient. Daughter reports that yvon stockings are uncomfortable on patient and are leaving an impression on skin near knee area. Yvon stocking observed to be pushed down off calves and positioned around ankles.  Patient / Family Goal: maximize function    Pain   Pain Associated Behaviors  - Vocalizations: groaning and moaning  - Facial Expressions: frowning and tightly closed eyes  - Non-Verbal Behaviors: protection/slow cautious movement    OBJECTIVE     Cognitive Status   Orientation    - Disoriented to: person, place, time and situation  Functional Communication   - Overall Status: impaired   - Forms of Communication: verbal  Attention Span    - Attention: - difficulty attending to directions - difficulty dividing attention   - Attention impairment: distractibility, reduced memory and internal factors  Following Direction   - follows one step commands with repetition and follows one step commands with increased time  Transition Between Tasks   - transitions with cues  Memory   - impaired  Safety Awareness/Insight   - impaired  Awareness of Deficits   - not aware of deficits    Patient Activity Tolerance: 1 to 2 activity to rest         Transfers  Assistive devices: gait belt, 2 person, non-mechanical sit to stand lift  - Sit to stand: moderate assist, 2 persons  - Stand to sit: moderate assist, 2 persons  Assist of two needed to promote anterior weight shift with sit to stand; frequent cues to keep eyes open and maintain attention to tasks; cues for upright posture seated in Jackelyn Stedy.      Activities of Daily Living (ADLs)  Grooming/Oral Hygiene:   - Grooming assist: maximal assist       - Oral hygiene assist: moderate assist  Oral hygiene  position: Completed grooming seated in Jackelyn Stedy.  - Assist needed for: set up, verbal cueing, supervision/safety, increased time to complete, wash/dry face, teeth care and brushing hair  Lower Body Dressing:   - Footwear:       - Assistance: total assist - dependent        - Position: chair       Type of footwear: don of tubi- and nonskid socks.  - Assist needed for: don/doff left sock and don/doff right sock  Applied size F tubi- due to limited tolerance for jewel stockings; total assist needed for application as well as to don nonskid socks.  Hand over hand assist needed to initiate oral cares/washing face and combing hair; assist for thoroughness; frequent cues to maintain alertness and attention.  Interventions    Training provided: ADL training, activity tolerance, body mechanics, transfer training, balance retraining, cognitive training, positioning, postural re-education, compression and safety training  Skilled input: verbal instruction/cues and tactile instruction/cues  Verbal Consent: Writer verbally educated and received verbal consent for hand placement, positioning of patient, and techniques to be performed today from patient for clothing adjustments for techniques and therapist position for techniques as described above and how they are pertinent to the patient's plan of care.         Education:   - Present and ready to learn: patient and patient's family  Education provided during session:  - Results of above outlined education: Needs reinforcement and Verbalizes understanding (daughter verbalizes understanding)    ASSESSMENT   Progress: slow progress  Interferring components: decreased activity tolerance, decreased insight into deficit, medical status limitations and cognitive deficits    Discharge needs based on today's assessment:  - Current level of function: significantly below baseline level of function  - Therapy needs at discharge: therapy 5 or more times per week  - Activities of  daily living (ADLs) requiring support at discharge: bed mobility, transfers, ambulation, dressing, grooming, bathing, toileting and continence  - Instrumental activities of daily living (IADLs) requiring support at discharge: community mobility, home management, meal preparation, driving, emergency responses, financial management, health/medication management and shopping  - Impairments that require further therapy intervention: ROM, strength, activity tolerance, safety awareness, motor planning, pain, balance, cognition, executive functioning and coordination  AM-PAC  - Prior Level of Function: IND/MOD I ([facility] 22-24)       Key: MOD A=moderate assistance, IND/MOD I=independent/modified independent  - Generalized Current Level of Function     - Current Self-Cares: 11       Scoring Key= >21 Modified Independent; 20-21 Supervision; 18-19 Minimal assist; 13-18 Moderate assist; 9-12 Max assist; <9 Total assist        PLAN (while hospitalized)  Suggestions for next session as indicated: Assist of two for safe transfers; may need Jackelyn Stedy; grooming completed seated in Jackelyn Stedy or in a wheelchair at sinkside; facilitate initiation and direction following of upper body self cares  OT Frequency: 6-7 x per week      PT/OT Mobility Equipment for Discharge: continue to assess  PT/OT ADL Equipment for Discharge: learning potential? - check carryover  Agreement to plan and goals: patient agrees with goals and treatment plan      GOALS  Review Date: 10/27/2023  Long Term Goals: (to be met by time of discharge from hospital)  Grooming: Patient will complete grooming tasks at sink and in sitting supervision.  Upper body dressing: Patient will complete upper body dressing in sitting set up.  Lower body dressing: Patient will complete lower body dressing in sitting and in standing minimal assist.  Toileting: Patient will complete toileting minimal assist.  Bathing: Patient will complete bathing sitting at sink using chair at sink,  minimal assist Toilet transfer: Patient will complete toilet transfer with 2-wheeled walker, supervision.   Item retrieval: Patient will complete item retrieval supervision.         Documented in the chart in the following areas: Assessment/Plan.    Patient at End of Session:   Location: in chair  Safety measures: alarm system in place/re-engaged, call light within reach and lines intact  Handoff to: nurse      Therapy procedure time and total treatment time can be found documented on the Time Entry flowsheet   Spontaneous, unlabored and symmetrical

## 2024-01-04 NOTE — PROGRESS NOTE ADULT - PROBLEM/PLAN-7
DISPLAY PLAN FREE TEXT
Patient/Caregiver provided printed discharge information.
